# Patient Record
Sex: MALE | Race: OTHER | Employment: OTHER | URBAN - METROPOLITAN AREA
[De-identification: names, ages, dates, MRNs, and addresses within clinical notes are randomized per-mention and may not be internally consistent; named-entity substitution may affect disease eponyms.]

---

## 2018-06-11 ENCOUNTER — OFFICE VISIT (OUTPATIENT)
Dept: FAMILY MEDICINE CLINIC | Age: 78
End: 2018-06-11

## 2018-06-11 VITALS
TEMPERATURE: 97.8 F | DIASTOLIC BLOOD PRESSURE: 60 MMHG | HEIGHT: 63 IN | BODY MASS INDEX: 26.58 KG/M2 | SYSTOLIC BLOOD PRESSURE: 163 MMHG | RESPIRATION RATE: 16 BRPM | HEART RATE: 65 BPM | WEIGHT: 150 LBS | OXYGEN SATURATION: 99 %

## 2018-06-11 DIAGNOSIS — H91.92 HEARING LOSS OF LEFT EAR, UNSPECIFIED HEARING LOSS TYPE: ICD-10-CM

## 2018-06-11 DIAGNOSIS — E11.9 CONTROLLED TYPE 2 DIABETES MELLITUS WITHOUT COMPLICATION, WITHOUT LONG-TERM CURRENT USE OF INSULIN (HCC): Primary | ICD-10-CM

## 2018-06-11 RX ORDER — SIMVASTATIN 40 MG/1
TABLET, FILM COATED ORAL
COMMUNITY
End: 2018-06-11 | Stop reason: SDUPTHER

## 2018-06-11 RX ORDER — ASPIRIN 81 MG/1
TABLET ORAL DAILY
COMMUNITY
End: 2018-06-11 | Stop reason: SDUPTHER

## 2018-06-11 RX ORDER — LANOLIN ALCOHOL/MO/W.PET/CERES
1000 CREAM (GRAM) TOPICAL DAILY
COMMUNITY
End: 2018-06-11 | Stop reason: SDUPTHER

## 2018-06-11 RX ORDER — PIOGLITAZONEHYDROCHLORIDE 45 MG/1
TABLET ORAL DAILY
COMMUNITY
End: 2018-06-11 | Stop reason: SDUPTHER

## 2018-06-11 RX ORDER — LISINOPRIL 20 MG/1
20 TABLET ORAL DAILY
Qty: 90 TAB | Refills: 0 | Status: SHIPPED | OUTPATIENT
Start: 2018-06-11 | End: 2018-08-20 | Stop reason: DRUGHIGH

## 2018-06-11 RX ORDER — PIOGLITAZONEHYDROCHLORIDE 45 MG/1
45 TABLET ORAL DAILY
Qty: 90 TAB | Refills: 0 | Status: SHIPPED | OUTPATIENT
Start: 2018-06-11 | End: 2018-08-20 | Stop reason: SDUPTHER

## 2018-06-11 RX ORDER — ASPIRIN 81 MG/1
81 TABLET ORAL DAILY
Qty: 90 TAB | Refills: 0 | Status: SHIPPED | OUTPATIENT
Start: 2018-06-11 | End: 2019-01-19 | Stop reason: SDUPTHER

## 2018-06-11 RX ORDER — LISINOPRIL 20 MG/1
TABLET ORAL DAILY
COMMUNITY
End: 2018-06-11 | Stop reason: SDUPTHER

## 2018-06-11 RX ORDER — SIMVASTATIN 40 MG/1
40 TABLET, FILM COATED ORAL
Qty: 90 TAB | Refills: 0 | Status: SHIPPED | OUTPATIENT
Start: 2018-06-11 | End: 2018-08-20 | Stop reason: SDUPTHER

## 2018-06-11 RX ORDER — LANOLIN ALCOHOL/MO/W.PET/CERES
1000 CREAM (GRAM) TOPICAL DAILY
Qty: 90 TAB | Refills: 0 | Status: SHIPPED | OUTPATIENT
Start: 2018-06-11 | End: 2018-08-20 | Stop reason: SDUPTHER

## 2018-06-11 NOTE — PATIENT INSTRUCTIONS
Re-start Janumet once every morning. Monitor your blood sugars and record the results. Stop Janumet if blood sugars go below 100    Follow up here in 4 weeks, or in VB at one of our other offices.

## 2018-06-11 NOTE — MR AVS SNAPSHOT
[address] [address] 
[phone] Patient: Rochelle Hardy MRN: [id number] Visit Information Date & Time Provider Department Dept. Phone Encounter #  
 6/11/2018  4:15 PM Kyler SongCasengo [phone] [id number] Follow-up Instructions Return if symptoms worsen or fail to improve. Upcoming Health Maintenance Date Due DTaP/Tdap/Td series (1 - Tdap) 6/25/1961 ZOSTER VACCINE AGE 60> 4/25/2000 GLAUCOMA SCREENING Q2Y 6/25/2005 Pneumococcal 65+ Low/Medium Risk (1 of 2 - PCV13) 6/25/2005 Influenza Age 5 to Adult 8/1/2018 Allergies as of 6/11/2018  Review Complete On: 6/11/2018 By: Jo Flowers LPN No Known Allergies Current Immunizations  Never Reviewed No immunizations on file. Not reviewed this visit You Were Diagnosed With   
  
 Codes Comments Controlled type 2 diabetes mellitus without complication, without long-term current use of insulin ([facility]    -  Primary ICD-10-CM: E11.9 ICD-9-CM: 250.00 Vitals BP Pulse Temp Resp Height(growth percentile) Weight(growth percentile) 163/60 (BP 1 Location: Right arm, BP Patient Position: Sitting) 65 97.8 °F (36.6 °C) (Oral) 16 5' 3\" (1.6 m) 150 lb (68 kg) SpO2 BMI Smoking Status 99% 26.57 kg/m2 Never Smoker Vitals History BMI and BSA Data Body Mass Index Body Surface Area  
 26.57 kg/m 2 1.74 m 2 Preferred Pharmacy Pharmacy Name Phone [address] [place] [address], [address] [place] [address] Ave AT [address] [place] [address] & [facility] [phone] Your Updated Medication List  
  
   
This list is accurate as of 6/11/18  4:52 PM.  Always use your most recent med list.  
  
  
  
  
 aspirin delayed-release 81 mg tablet Take  by mouth daily. lisinopril 20 mg tablet Commonly known as:  Miryam Maldonado  
 Take  by mouth daily. pioglitazone 45 mg tablet Commonly known as:  ACTOS Take  by mouth daily. simvastatin 40 mg tablet Commonly known as:  ZOCOR Take  by mouth nightly. SITagliptin-metFORMIN  mg per tablet Commonly known as:  Prudencio Carrow Take 1 Tab by mouth daily. VITAMIN B-12 1,000 mcg tablet Generic drug:  cyanocobalamin Take 1,000 mcg by mouth daily. Prescriptions Sent to Pharmacy Refills SITagliptin-metFORMIN (JANUMET)  mg per tablet 5 Sig: Take 1 Tab by mouth daily. Class: Normal  
 Pharmacy: [facility] [place], [address] [place] [address] [address]  #: [phone] Route: Oral  
  
Follow-up Instructions Return if symptoms worsen or fail to improve. Patient Instructions Re-start Janumet once every morning. Monitor your blood sugars and record the results. Stop Janumet if blood sugars go below 100 Follow up here in 4 weeks, or in VB at one of our other offices. Introducing [facility]! [facility] introduces [employer] patient portal. Now you can access parts of your medical record, email your doctor's office, and request medication refills online. 1. In your internet browser, go to [email or web address] 2. Click on the First Time User? Click Here link in the Sign In box. You will see the New Member Sign Up page. 3. Enter your [employer] Access Code exactly as it appears below. You will not need to use this code after youve completed the sign-up process. If you do not sign up before the expiration date, you must request a new code. · [employer] Access Code: [email or web address] Expires: 9/9/2018  4:52 PM 
 
4. Enter the last four digits of your Social Security Number (xxxx) and Date of Birth (mm/dd/yyyy) as indicated and click Submit. You will be taken to the next sign-up page. 5. Create a [employer] ID. This will be your [employer] login ID and cannot be changed, so think of one that is secure and easy to remember. 6. Create a [employer] password. You can change your password at any time. 7. Enter your Password Reset Question and Answer. This can be used at a later time if you forget your password. 8. Enter your e-mail address. You will receive e-mail notification when new information is available in [address] 9. Click Sign Up. You can now view and download portions of your medical record. 10. Click the Download Summary menu link to download a portable copy of your medical information. If you have questions, please visit the Frequently Asked Questions section of the [employer] website. Remember, [employer] is NOT to be used for urgent needs. For medical emergencies, dial 911. Now available from your iPhone and Android! Please provide this summary of care documentation to your next provider. If you have any questions after today's visit, please call [phone].

## 2018-06-11 NOTE — PROGRESS NOTES
Rm:1    Chief Complaint   Patient presents with    Diabetes     pt states he is Type 2     Depression Screening:  PHQ over the last two weeks 6/11/2018   Little interest or pleasure in doing things Not at all   Feeling down, depressed or hopeless Not at all   Total Score PHQ 2 0       Learning Assessment:  Learning Assessment 6/11/2018   PRIMARY LEARNER Patient   HIGHEST LEVEL OF EDUCATION - PRIMARY LEARNER  GRADUATED HIGH SCHOOL OR GED   PRIMARY LANGUAGE Dominican   LEARNER PREFERENCE PRIMARY DEMONSTRATION   ANSWERED BY son   RELATIONSHIP OTHER       Abuse Screening:  No flowsheet data found. Health Maintenance reviewed and discussed per provider: yes     Coordination of Care:    1. Have you been to the ER, urgent care clinic since your last visit? Hospitalized since your last visit? no    2. Have you seen or consulted any other health care providers outside of the [address] [place] [address] since your last visit? Include any pap smears or colon screening.  no

## 2018-06-12 ENCOUNTER — PATIENT MESSAGE (OUTPATIENT)
Dept: FAMILY MEDICINE CLINIC | Age: 78
End: 2018-06-12

## 2018-06-12 DIAGNOSIS — H91.92 HEARING LOSS OF LEFT EAR, UNSPECIFIED HEARING LOSS TYPE: Primary | ICD-10-CM

## 2018-06-12 NOTE — PROGRESS NOTES
HISTORY OF PRESENT ILLNESS  Glenn Lozada is a 68 y.o. male. HPI Comments: Pt is here to establish care (with his son who helps with translating) because his previous doctor doesn't take his new insurance. However [place]  so he asks if we have any providers closer to his house. His only medical problem is T2D, apparently it is well-controlled. In 12/2017 he had an A1C of 6.7 and at that time he was taking Actos 45 mg and Janumet 5/500. He was seen a few months after that at his doctor's office and the doctor discontinued the Janumet at that time. Now, Mr. Alfonso Boswell says his blood sugars are higher (\"around 158\") but he doesn't feel as good as he did before. He needs a refill of his medications. He also mentions that he can't hear out of his left ear since trying to \"pop it\" several weeks ago by holding his nose and blowing. He would like a referral.    Diabetes   Pertinent negatives include no chest pain, no abdominal pain, no headaches and no shortness of breath. Review of Systems   Constitutional: Negative for chills and fever. Pleasant, NAD   HENT: Positive for hearing loss. Eyes: Negative for blurred vision and double vision. Respiratory: Negative for cough and shortness of breath. Cardiovascular: Negative for chest pain and palpitations. Gastrointestinal: Negative for abdominal pain, nausea and vomiting. Neurological: Negative for headaches. Physical Exam   Constitutional: He appears well-developed and well-nourished. HENT:   Right Ear: Tympanic membrane, external ear and ear canal normal.   Left Ear: Tympanic membrane, external ear and ear canal normal.   Nose: Nose normal.   Mouth/Throat: Uvula is midline, oropharynx is clear and moist and mucous membranes are normal. No posterior oropharyngeal erythema. L TM looks normal, no perf seen   Eyes: Conjunctivae are normal. Pupils are equal, round, and reactive to light.  Right conjunctiva is not injected. Left conjunctiva is not injected. Neck: Neck supple. No thyromegaly present. Cardiovascular: Normal rate, regular rhythm and normal heart sounds. Pulmonary/Chest: Effort normal and breath sounds normal. No respiratory distress. He has no wheezes. He has no rales. Abdominal: He exhibits no distension. There is no tenderness. There is no rebound. Lymphadenopathy:     He has no cervical adenopathy. Skin: No rash noted. Psychiatric: He has a normal mood and affect. His behavior is normal.   Nursing note and vitals reviewed. ASSESSMENT and PLAN    ICD-10-CM ICD-9-CM    1. Controlled type 2 diabetes mellitus without complication, without long-term current use of insulin ([facility]) E11.9 250.00 SITagliptin-metFORMIN (JANUMET)  mg per tablet      simvastatin (ZOCOR) 40 mg tablet      lisinopril (PRINIVIL, ZESTRIL) 20 mg tablet      aspirin delayed-release 81 mg tablet      cyanocobalamin (VITAMIN B-12) 1,000 mcg tablet      pioglitazone (ACTOS) 45 mg tablet      DISCONTINUED: simvastatin (ZOCOR) 40 mg tablet      DISCONTINUED: lisinopril (PRINIVIL, ZESTRIL) 20 mg tablet      DISCONTINUED: aspirin delayed-release 81 mg tablet      DISCONTINUED: cyanocobalamin (VITAMIN B-12) 1,000 mcg tablet      DISCONTINUED: pioglitazone (ACTOS) 45 mg tablet   2.  Hearing loss of left ear, unspecified hearing loss type H91.92 389.9 REFERRAL TO ENT-OTOLARYNGOLOGY

## 2018-06-13 NOTE — TELEPHONE ENCOUNTER
From: Rochelle Hardy  To: Kyler Song MD  Sent: 6/12/2018 4:51 PM EDT  Subject: Non-Urgent Medical Question    Good afternoon Doctor. We were there on the 11th of June. We forgot to ask you about the appointment information to see an specialist for my dad's hearing lost on the left ear. Please let us know. Thanks.

## 2018-07-11 ENCOUNTER — OFFICE VISIT (OUTPATIENT)
Dept: FAMILY MEDICINE CLINIC | Age: 78
End: 2018-07-11

## 2018-07-11 VITALS
TEMPERATURE: 96.4 F | BODY MASS INDEX: 26.93 KG/M2 | WEIGHT: 152 LBS | HEIGHT: 63 IN | HEART RATE: 63 BPM | RESPIRATION RATE: 14 BRPM | SYSTOLIC BLOOD PRESSURE: 167 MMHG | DIASTOLIC BLOOD PRESSURE: 48 MMHG | OXYGEN SATURATION: 96 %

## 2018-07-11 DIAGNOSIS — E11.8 TYPE 2 DIABETES MELLITUS WITH COMPLICATION, WITHOUT LONG-TERM CURRENT USE OF INSULIN (HCC): ICD-10-CM

## 2018-07-11 DIAGNOSIS — D50.8 IRON DEFICIENCY ANEMIA SECONDARY TO INADEQUATE DIETARY IRON INTAKE: ICD-10-CM

## 2018-07-11 DIAGNOSIS — R01.1 HEART MURMUR: ICD-10-CM

## 2018-07-11 DIAGNOSIS — J30.9 ALLERGIC RHINITIS, UNSPECIFIED SEASONALITY, UNSPECIFIED TRIGGER: ICD-10-CM

## 2018-07-11 DIAGNOSIS — R60.0 LOWER LEG EDEMA: Primary | ICD-10-CM

## 2018-07-11 DIAGNOSIS — E03.8 SUBCLINICAL HYPOTHYROIDISM: ICD-10-CM

## 2018-07-11 DIAGNOSIS — I10 ESSENTIAL HYPERTENSION: ICD-10-CM

## 2018-07-11 LAB — HBA1C MFR BLD HPLC: 8.6 %

## 2018-07-11 RX ORDER — MINERAL OIL
180 ENEMA (ML) RECTAL DAILY
Qty: 90 TAB | Refills: 1 | Status: SHIPPED | OUTPATIENT
Start: 2018-07-11 | End: 2018-09-27

## 2018-07-11 RX ORDER — FLUTICASONE PROPIONATE 50 MCG
2 SPRAY, SUSPENSION (ML) NASAL DAILY
Qty: 1 BOTTLE | Refills: 8 | Status: SHIPPED | OUTPATIENT
Start: 2018-07-11 | End: 2021-11-23

## 2018-07-11 NOTE — MR AVS SNAPSHOT
Perry Singleton [place] 879 68 [facility] Walter. 320 [facility] 83 [id number] 
[phone] Patient: Adán Levy MRN: [id number] AMC:[birth year]30[birth year] Visit Information Date & Time Provider Department Dept. Phone Encounter #  
 7/11/2018  3:00 PM Parris Singh, [address] [phone] [id number] Follow-up Instructions Return in about 1 month (around 8/11/2018). Upcoming Health Maintenance Date Due DTaP/Tdap/Td series (1 - Tdap) 6/25/1961 ZOSTER VACCINE AGE 60> 4/25/2000 GLAUCOMA SCREENING Q2Y 6/25/2005 Pneumococcal 65+ Low/Medium Risk (1 of 2 - PCV13) 6/25/2005 Influenza Age 5 to Adult 8/1/2018 Allergies as of 7/11/2018  Review Complete On: 7/11/2018 By: Tracey Gonzalez LPN No Known Allergies Current Immunizations  Never Reviewed No immunizations on file. Not reviewed this visit You Were Diagnosed With   
  
 Codes Comments Lower leg edema    -  Primary ICD-10-CM: R60.0 ICD-9-CM: 782.3 Heart murmur     ICD-10-CM: R01.1 ICD-9-CM: 132. 2 Type 2 diabetes mellitus with complication, without long-term current use of insulin ([facility])     ICD-10-CM: E11.8 ICD-9-CM: 250.90 Essential hypertension     ICD-10-CM: I10 
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     ICD-10-CM: D50.8 ICD-9-CM: 280.1 Subclinical hypothyroidism     ICD-10-CM: E03.9 ICD-9-CM: 244.8 Controlled type 2 diabetes mellitus without complication, without long-term current use of insulin ([facility]     ICD-10-CM: E11.9 ICD-9-CM: 250.00 Allergic rhinitis, unspecified seasonality, unspecified trigger     ICD-10-CM: J30.9 ICD-9-CM: 477.9 Vitals BP Pulse Temp Resp Height(growth percentile) Weight(growth percentile) 167/48 (BP 1 Location: Left arm, BP Patient Position: Sitting) 63 96.4 °F (35.8 °C) (Oral) 14 5' 3\" (1.6 m) 152 lb (68.9 kg) SpO2 BMI Smoking Status 96% 26.93 kg/m2 Never Smoker Vitals History BMI and BSA Data Body Mass Index Body Surface Area  
 26.93 kg/m 2 1.75 m 2 Preferred Pharmacy Pharmacy Name Phone 509 [facility], [address] AT [address] [place] [address] NECK [phone] Your Updated Medication List  
  
   
This list is accurate as of 7/11/18  4:32 PM.  Always use your most recent med list.  
  
  
  
  
 aspirin delayed-release 81 mg tablet Take 1 Tab by mouth daily. cyanocobalamin 1,000 mcg tablet Commonly known as:  VITAMIN B-12 Take 1 Tab by mouth daily. fexofenadine 180 mg tablet Commonly known as:  Rima Saran Take 1 Tab by mouth daily. fluticasone 50 mcg/actuation nasal spray Commonly known as:  Darlin Caller 2 Sprays by Both Nostrils route daily. lisinopril 20 mg tablet Commonly known as:  Aleman Addy Take 1 Tab by mouth daily. pioglitazone 45 mg tablet Commonly known as:  ACTOS Take 1 Tab by mouth daily. simvastatin 40 mg tablet Commonly known as:  ZOCOR Take 1 Tab by mouth nightly. SITagliptin-metFORMIN  mg per tablet Commonly known as:  Neville Snyder Take 1 Tab by mouth two (2) times daily (with meals). Prescriptions Sent to Pharmacy Refills  
 fexofenadine (ALLEGRA) 180 mg tablet 1 Sig: Take 1 Tab by mouth daily. Class: Normal  
 Pharmacy: [facility] Ramiro Richards [facility], [address] [place] [address] [address] Ph #: [phone] Route: Oral  
 SITagliptin-metFORMIN (JANUMET)  mg per tablet 1 Sig: Take 1 Tab by mouth two (2) times daily (with meals). Class: Normal  
 Pharmacy: [facility] [address] [facility] Ramiro Richards [place], [address] [place] [address] [address] [facility] [address] Ph #: [phone] Route: Oral  
 fluticasone (FLONASE) 50 mcg/actuation nasal spray 8 Si Sprays by Both Nostrils route daily. Class: Normal  
 Pharmacy: [facility] [address], [address]  #: [phone] Route: Both Nostrils We Performed the Following AMB POC HEMOGLOBIN A1C [96854 CPT(R)] Follow-up Instructions Return in about 1 month (around 2018). To-Do List   
 2018 Lab:  CBC WITH AUTOMATED DIFF   
  
 2018 Echocardiography:  ECHO ADULT COMPLETE   
  
 2018 Lab:  METABOLIC PANEL, COMPREHENSIVE   
  
 2018 Lab:  NT-PRO BNP   
  
 2018 Lab:  T3, FREE   
  
 2018 Lab:  T4, FREE   
  
 2018 Lab:  THYROID ANTIBODY PANEL   
  
 2018 Lab:  TSH 3RD GENERATION Patient Instructions Dieta DASH: Instrucciones de cuidado - [ DASH Diet: Care Instructions ] Instrucciones de cuidado La dieta DASH es un plan de alimentación que puede ayudar a bajar la presión arterial. DASH es la sigla en inglés de \"Dietary Approaches to Stop Hypertension\" (medidas dietéticas para disminuir la hipertensión). Hipertensión significa presión arterial darío. La dieta DASH se concentra en el consumo de alimentos con alto contenido de calcio, potasio y [place]. Estos nutrientes pueden disminuir la presión arterial. Los alimentos con el mayor contenido de [place] nutrientes son las frutas, las verduras, los productos lácteos de bajo contenido de Reina Arbon, las nueces, las semillas y las legumbres. Rajat vern suplementos de calcio, potasio y magnesio, en lugar de comer alimentos ricos en estos nutrientes, no tiene el mismo efecto. La dieta DASH también incluye granos integrales, pescado y aves. La dieta DASH es liyah de los cambios de estilo de liv que quizá le recomiende mcnair médico para reducir la presión arterial darío.  Es posible que mcnair médico también quiera que usted reduzca la cantidad de sodio en mcnair dieta. Reducir el sodio mientras sigue la dieta DASH puede bajar la presión arterial incluso más que únicamente con la dieta DASH. La atención de seguimiento es katie parte clave de mcnair tratamiento y seguridad. Asegúrese de hacer y acudir a todas las citas, y llame a mcnair médico si está teniendo problemas. También es katie buena idea saber los resultados de acacia exámenes y mantener katie lista de los medicamentos que meghan. Cómo puede cuidarse en el hogar? Cómo seguir la dieta DASH 
· Coma entre 4 y 5 porciones de fruta al día. Katie porción incluye 1 fruta de [place], ½ taza de fruta enlatada o cortada en trozos, 1/4 taza de fruta seca o 4 onzas (½ taza) de jugo de frutas. Elija fruta con más frecuencia que jugo. · Consuma entre 4 y 11 porciones de verduras al día. Katie porción incluye 1 taza de [place] o de verduras de hojas crudas, ½ taza de verduras cocidas o cortadas en trozos, o 4 onzas (½ taza) de jugo de verduras. Elija comer verduras con más frecuencia que vern mcnair jugo. · Consuma entre 2 y 3 porciones de lácteos semidescremados y descremados al día. Katie porción incluye 8 onzas de [place], 1 taza de yogur o 1½ onzas de [place]-barre. · Coma entre 6 y 6 porciones de granos todos los [address] Katie porción incluye 1 rebanada de pan, 1 onza de cereal seco, o ½ taza de arroz, pasta o cereal cocido. Trate de elegir productos de grano integral con la mayor frecuencia posible. · Limite la cantidad de [place]), aves y pescado a 2 porciones al día. Lupe Conch porción son 3 onzas, lo que es aproximadamente el tamaño de un ada de naipes. · Coma entre 4 y 5 porciones de nueces, semillas y legumbres (frijoles [habichuelas], lentejas y arvejas [chícharos] secos y cocidos) a la semana. Katie porción incluye 1/3 taza de nueces, 2 cucharadas de semillas o ½ taza de frijoles o arvejas cocidos. · [address] [facility] y aceites a 2 o 3 porciones al día. Katie porción es 1 cucharadita de aceite vegetal o 2 cucharadas de aderezo para ensaladas. · Limite los dulces y el azúcar adicional a 5 porciones o menos por semana. Brittany Lasso porción es 1 cucharada de [place] o [place], ½ taza de sorbete o 1 taza de limonada. · Consuma menos de 2,300 miligramos (mg) de sodio al día. Si limita mcnair consumo de sodio a 1,500 mg al día, puede reducir mcnair presión arterial aún más. Consejos para tener éxito · Inicie con cambios pequeños. No intente hacer cambios radicales en mcnair dieta de manera repentina. Podría sentir que extraña acacia comidas favoritas y, por lo [place], belle katie mayor probabilidad de que no cumpla el plan. Kandee Drain y [place]. Celeste Gregg que esos cambios se conviertan en un hábito, incorpore algunos [employer]. · Pruebe algo de lo siguiente: 
¨ Fíjese el objetivo de comer katie porción de fruta o de verduras en todas las comidas y los refrigerios. [place] facilitará alcanzar la cantidad diaria recomendada de frutas y verduras. ¨ Pruebe comer yogur cubierto con frutas frescas y nueces ignacio refrigerio o ignacio postre saludable. ¨ Agregue ariel, tomate, pepino y cebolla a acacia sándwiches. ¨ Combine katie masa de pizza precocida con queso mozzarella de bajo contenido de grasa (\"low-fat\") y cúbralo con abundantes verduras. Intente utilizar tomates, calabaza, espinaca, brócoli, zanahorias, coliflor y cebollas. ¨ Opte por comer katie variedad de verduras cortadas en trozos con un aderezo de bajo contenido de grasa ignacio refrigerio, en lugar de \"chips\" (tipo marco fritas) y un \"dip\" (producto untable). ¨ Espolvoree semillas de girasol o almendras picadas [employer]. O intente agregar nueces o almendras picadas a las verduras cocidas. ¨ Pruebe algunas comidas vegetarianas con frijoles y arvejas. Rushie Hews o frijoles rojos a las ensaladas. Prepare burritos y tacos con puré de frijoles pintos o negros. Dónde puede encontrar más información en inglés? Lv Perez a [email or web address].info/. Hossein [facility] G367 en la búsqueda para aprender más acerca de \"Dieta DASH: Instrucciones de cuidado - [ DASH Diet: Care Instructions ]. \" 
Revisado: 6 gem, 2017 Versión del contenido: 11.7 © 1897-8564 Healthwise, Incorporated. Las instrucciones de cuidado fueron adaptadas bajo licencia por Good Help Connections (which disclaims liability or warranty for this information). Si usted tiene [place] afección médica o sobre estas instrucciones, siempre pregunte a mcnair profesional de joe. Healthwise, Incorporated niega toda garantía o responsabilidad por mcnair uso de esta información. Dieta baja en sodio (2,000 miligramos): Instrucciones de cuidado - [ Low Sodium Diet (2,000 Milligram): Care Instructions ] Instrucciones de cuidado Demasiado sodio hace que el organismo retenga agua en exceso. [place] puede aumentar la presión arterial y obligar al corazón y a los riñones a trabajar Corlis Kin. En casos muy graves, podrían tener que hospitalizarlo. Hasta podría poner en peligro la liv. Si limita el consumo de sodio, se sentirá mejor y reducirá mcnair riesgo de tener problemas graves. La julianna más común de sodio es la sal. Las personas obtienen la mayor parte de la sal en mcnair dieta de los alimentos enlatados, preparados y de paquete. La comida rápida y los platillos de restaurante también tienen niveles muy altos de [place]. Es probable que mcnair médico le limite el sodio a menos de 2,000 miligramos (mg) al día. Marlin límite tiene en cuenta todo el sodio presente en los alimentos preparados y de [place] [employer] force, y toda la sal que añada a acacia alimentos. La atención de seguimiento es katie parte clave de mcnair tratamiento y seguridad. Asegúrese de hacer y acudir a todas las citas, y llame a mcnair médico si está teniendo problemas. También es katie buena idea saber los resultados de acacia exámenes y mantener katie lista de los medicamentos que meghan. Cómo puede cuidarse en el hogar? Tequila las etiquetas de los alimentos · [address] latas y los alimentos de paquete. [employer] qué cantidad de sodio (\"sodium\") hay en cada porción. Asegúrese de fijarse en el tamaño de la porción. Si usted come [employer] porción, consumirá emids. · Las etiquetas de los alimentos también indican el Porcentaje del Valor Diario (\"Percent Daily Value\") recomendado para el sodio. Escoja productos con un bajo Porcentaje del Valor Diario de [place]. · Tenga en cuenta que el sodio puede venir en otras formas además de la sal, entre las que se incluyen el glutamato monosódico (MSG, por acacia siglas en inglés), el citrato de sodio y el bicarbonato de [place]. La comida asiática frecuentemente contiene MSG añadido. Si sale a comer, a veces puede pedir que no le pongan MSG ni sal a acacia alimentos. Compre alimentos bajos en sodio · Compre alimentos que indiquen en la etiqueta \"sin sal\" (\"unsalted\") (sin sal añadida), \"sin sodio\" (\"sodium-free\") (menos de 5 mg de sodio por porción) o \"bajo en sodio\" (\"low-sodium\") (menos de 140 mg de sodio por porción). Los alimentos que indiquen en la etiqueta \"reducido en sodio\" (\"reduced-sodium\") y \"ligero contenido de sodio\" (\"light sodium\") aún pueden contener demasiado sodio. Asegúrese de leer la etiqueta para verificar cuánto sodio está consumiendo. · Compre verduras frescas o congeladas que no tengan salsas [id number]. Compre las versiones de bajo sodio de verduras [place], sopas y otros productos enlatados. Prepare comidas bajas en sodio · Reduzca la cantidad de sal que [place] para cocinar. [place] le ayudará a acostumbrarse al [employer]. No añada alvaro después de belle cocinado. Princess cucharadita de sal contiene alrededor de 2,300 mg de sodio. · Retire el salero de la quintanilla. · Sazone acacia comidas con ajo, jugo de hugo, cebolla, vinagre, hierbas y especias.  No use salsa de soya, salsa de soya \"lite\", salsa para stan, sal de cebolla, sal de ajo o de apio, mostaza ni ketup (salsa de [place]) en acacia comidas. · Use aderezos para ensaladas, salsas y ketchup de bajo contenido de [place]. O prepare acacia propios aderezos para ensaladas y salsas sin añadir sal. 
· Use menos sal (o nada) cuando la receta lo pida. Por lo general puede añadir la mitad de la cantidad de alvaro que pide la receta sin que esta pierda el sabor. Otros alimentos ignacio el arroz, las pastas y los cereales no necesitan sal adicional. 
· Enjuague las verduras enlatadas y cocínelas en agua fresca. [place] le nato algo de sal, marsha no toda. · Evite el agua que naturalmente tenga un alto contenido de sodio o que haya sido tratada con ablandadores, los cuales [place]. Llame a mcnair compañía de agua local para averiguar cuál es el contenido de sodio del agua. Si compra agua embotellada, susan la etiqueta y escoja katie byron sin sodio. Evite los alimentos altos en sodio · Evite comer: ¨ Candido, pescados y aves [place], curados, salados y [place]. ¨ Jamón, tocino, perros calientes (\"hot dogs\") y candido frías. ¨ Queso común, edouard y procesado y mantequilla de cacahuate (maní) común. ¨ Galletas saladas y otros refrigerios salados ignacio \"pretzels\", \"chips\" (ignacio vandana fritas) y palomitas de maíz saladas. ¨ Comidas preparadas y congeladas, a menos que tengan katie etiqueta que diga \"bajo en sodio\" (\"low-sodium\"). ¨ Sopas, caldos y consomés enlatados y secos o deshidratados, a menos que digan \"sin sodio\" (\"sodium-free\") o \"bajo en sodio\" (\"low-sodium\"). ¨ Verduras enlatadas, a menos que digan \"sin sodio\" (\"sodium-free\") o \"bajo en sodio\" (\"low-sodium\"). ¨ Vandana fritas (a la francesa), pizzas, tacos y otras comidas rápidas. ¨ Pepinillos, aceitunas, ketchup y otros condimentos, en especial la salsa de soya, a menos que digan \"sin sodio\" (\"sodium-free\") o \"bajo en sodio\" (\"low-sodium\"). Dónde puede encontrar más información en inglés? Jayy Cabezas a [email or web address].[email or web address] [place] N836 en la búsqueda para aprender más acerca de \"Dieta baja en sodio (2,000 miligramos): Instrucciones de cuidado - [ Low Sodium Diet (2,000 Milligram): Care Instructions ]. \" 
Revisado: 12 mayo, 2017 Versión del contenido: 11.7 © 1670-5353 Healthwise, Incorporated. Las instrucciones de cuidado fueron adaptadas bajo licencia por Good Help Connections (which disclaims liability or warranty for this information). Si usted tiene [place] afección médica o sobre estas instrucciones, siempre pregunte a mcnair profesional de joe. Healthwise, Incorporated niega toda garantía o responsabilidad por mcnair uso de esta información. Cómo leer las etiquetas de los alimentos para limitar el consumo de sodio: Instrucciones de cuidado - [ How to Read a Food Label to Limit Sodium: Care Instructions ] Instrucciones de cuidado El sodio hace que el cuerpo retenga agua en exceso. [place] puede aumentar la presión arterial y obligar al corazón y a los riñones a trabajar Lizeth Embs. En casos muy graves, podrían tener que hospitalizarlo. Hasta podría poner en peligro la liv. Si limita el consumo de sodio, se sentirá mejor y reducirá mcnair riesgo de tener problemas graves. Los alimentos procesados, las comidas rápidas y los platillos de restaurante son las guidry principales de sodio en la alimentación. El [address] común del sodio es \"sal\". Trate de limitar la cantidad de sodio que consume a menos de 2,300 miligramos (mg) al día. Si limita mcnair consumo de sodio a 1,500 mg al día, puede reducir mcnair presión arterial aún más. Marlin límite incluye toda la sal que consuma en los alimentos que cocine o en los de paquete. Lleve katie lista de todo lo que come y yesenia. La atención de seguimiento es katie parte clave de mcnair tratamiento y seguridad. Asegúrese de hacer y acudir a todas las citas, y llame a mcnair médico si está teniendo problemas.  También es katie buena idea [employer] resultados de acacia exámenes y mantener katie lista de los medicamentos que meghan. Cómo puede cuidarse en el hogar? Tequila la lista de ingredientes en las etiquetas de los alimentos · Tequila la lista de ingredientes en las etiquetas de los alimentos para saber cuánto sodio contienen. La etiqueta indica los ingredientes de un alimento en orden descendente (de mayor a gloria). Si la sal o el sodio es liyah de los primeros elementos de la lista, jaqueline alimento puede contener mucho sodio. · El sodio tiene nombres [place]. El sodio también se conoce ignacio glutamato monosódico (MSG, por acacia siglas en inglés, común en la comida [place]), citrato de [place], alginato de [place], hidróxido de sodio y fosfato de sodio. Tequila la información nutricional en las etiquetas · La mayoría de los alimentos tienen katie etiqueta con información nutricional. Allí encontrará la cantidad de sodio que tiene katie porción del alimento. Tequila los datos acerca del tamaño de la porción y la cantidad de [place]. El tamaño de la porción se encuentra en la parte superior de la etiqueta, por lo general, debajo del título \"Información nutricional\" (Nutrition Facts). La cantidad de sodio se encuentra en la lista debajo del título. Se expresa en miligramos (mg). ¨ Verifique detenidamente el tamaño de la porción. Katie ashley porción suele ser Gaby Negron, y es posible que coma más de [facility]. Si chilango es el gus, la cantidad de sodio que consuma será mayor que la que se menciona en la etiqueta. Por ejemplo, si el tamaño de la porción de katie sopa enlatada es 1 taza y la cantidad de sodio es 470 mg, si meghan 2 tazas, consumirá 940 mg de sodio. · La información nutricional de las frutas y las verduras frescas no aparece en esos alimentos. Es posible que se encuentre detallada en algún lado de la rebecca. Estos alimentos suelen no tener sodio o son bajos en sodio.  
· La etiqueta de información nutricional también proporciona el porcentaje del valor diario de sodio. Marlin valor es la cantidad de sodio recomendada que contiene katie porción. El valor diario para el sodio es menos de 2,300 mg. Así, si el porcentaje del valor diario dice 50%, significa que katie porción le está aportando la mitad, o sea, 1,150 mg. Compre alimentos con bajo contenido de [place] · Busque alimentos que hayan sido elaborados con gloria cantidad de sodio. Busque las siguientes palabras en la etiqueta. ¨ \"Unsalted\" (sin sal) significa que el alimento no tiene sodio agregado. Rajat es posible que el alimento ya tenga sodio en forma natural. 
¨ \"Sodium-free\" (sin sodio) significa que katie porción tiene menos de 5 mg de sodio. ¨ \"Very low sodium\" (muy bajo contenido de [place]) significa que katie porción tiene 35 mg de sodio o menos. ¨ \"Low-sodium\" (bajo contenido de sodio) significa que katie porción tiene 140 mg de sodio o menos. · \"Reduced-sodium\" (reducido contenido de sodio) significa que el alimento tiene un 25% menos de sodio que lo normal. [place], esta cantidad de sodio es aún muy darío. Trate de no comprar alimentos que digan \"reducido contenido de sodio\" en la etiqueta. · Compre verduras frescas o verduras congeladas que no tengan salsas añadidas. Compre las versiones de bajo sodio de verduras, sopas y otros productos enlatados. Dónde puede encontrar más información en inglés? Senia Noss a [email or web address] Escriba B757 en la búsqueda para aprender más acerca de \"Cómo leer las etiquetas de los alimentos para limitar el consumo de sodio: Instrucciones de cuidado - [ How to Read a Food Label to Limit Sodium: Care Instructions ]. \" 
Revisado: 12 brothers, 2017 Versión del contenido: 11.7 © 4222-0997 [employer]. Las instrucciones de cuidado fueron adaptadas bajo licencia por Good Help Connections (which disclaims liability or warranty for this information).  Si usted tiene preguntas sobre katie afección médica o sobre estas instrucciones, siempre pregunte a mcnair profesional de joe. Healthwise, Incorporated niega toda garantía o responsabilidad por mcnair uso de esta información. Dieta giovani en donna: Instrucciones de cuidado - [ Iron-Rich Diet: Care Instructions ] Instrucciones de cuidado Mcnair organismo necesita donna para producir hemoglobina. La hemoglobina es katie sustancia presente en los glóbulos rojos que lleva el oxígeno de los pulmones a las células de todo el cuerpo. Si no tiene suficiente donna, el organismo produce menos glóbulos rojos y de gloria tamaño. [address] células del organismo podrían no recibir suficiente oxígeno. Los hombres adultos necesitan 8 miligramos de donna al día y las mujeres adultas necesitan 18 miligramos al día. Después de la menopausia, las mujeres necesitan 8 miligramos de donna al día. Katie juliet embarazada necesita 27 miligramos de donna al día. Los bebés y niños pequeños tienen mayores necesidades de donna en proporción a mcnair tamaño que otros grupos de Karthikeyan. Las personas que carrillo perdido errol debido a úlceras o períodos menstruales abundantes podrían tener niveles muy bajos de donna y desarrollar anemia. [address] pueden obtener el donna que mcnair cuerpo necesita comiendo suficiente cantidad de ciertos alimentos ricos en donna. Mcnair médico podría recomendarle que tome un suplemento de donna junto con aktie dieta giovani en donna. La atención de seguimiento es katie parte clave de mcnair tratamiento y seguridad. Asegúrese de hacer y acudir a todas las citas, y llame a mcnair médico si está teniendo problemas. También es katie buena idea saber los resultados de acacia exámenes y mantener katie lista de los medicamentos que meghan. Cómo puede cuidarse en el hogar? · Giuliana que los alimentos ricos en donna alexandra parte de mcnair Lucrezia Bell. Los alimentos ricos en donna incluyen: ¨ Todas las stan, ignacio dotty, res, franco, cerdo, pescado y [place]. El hígado tiene un contenido especialmente alto de donna. ¨ Verduras de [employer]. ¨ Uvas pasas, chícharos (arvejas), frijoles (habichuelas), lentejas, cebada y huevos. ¨ Cereales para el desayuno enriquecidos con donna. · Consuma alimentos que contengan vitamina C junto con alimentos ricos en donna. La vitamina C le ayuda a absorber más donna de los alimentos. Giovanna un vaso de jugo de naranja u otro jugo cítrico con las comidas. · Coma carne y vegetales o Ronny Pun. El donna de la carne ayuda al organismo a absorber el donna presente en otros alimentos. Dónde puede encontrar más información en inglés? Reardan Sara a [email or web address] Escriba Z290 en la búsqueda para aprender más acerca de \"Dieta giovani en donna: Instrucciones de cuidado - [ Iron-Rich Diet: Care Instructions ]. \" 
Revisado: 12 mayo, 2017 Versión del contenido: 11.7 © 9815-9212 Healthwise, Incorporated. Las instrucciones de cuidado fueron adaptadas bajo licencia por Good Help Connections (which disclaims liability or warranty for this information). Si usted tiene [place] afección médica o sobre estas instrucciones, siempre pregunte a mcnair profesional de joe. Healthwise, Incorporated niega toda garantía o responsabilidad por mcnair uso de esta información. Ecocardiograma para la insuficiencia cardíaca: Instrucciones de cuidado - [ Echocardiogram for Heart Failure: Care Instructions ] Instrucciones de cuidado Un ecocardiograma, también llamado \"eco\", es un examen muy útil para comprobar si hay insuficiencia cardíaca. Insuficiencia cardíaca quiere decir que el corazón no puede bombear toda la errol que el cuerpo necesita. Renetta un ecocardiograma, mcnair médico puede comprobar la cantidad de errol que mcnair corazón bombea con cada latido. Crum se llama fracción de eyección. Un ecocardiograma también puede mostrar si mcnair corazón está agrandado y si las válvulas cardíacas funcionan ignacio [address] Renetta un ecocardiograma, usted se acuesta en katie shell. Un técnico mueve por el pecho un dispositivo portátil llamado transductor. El dispositivo envía ondas sonoras que hacen eco en el corazón. Crean katie imagen del corazón latiendo. El técnico podría pedirle que respire lentamente o que mantenga la respiración. Un ecocardiograma dura alrededor de 30 a 60 minutos. La atención de seguimiento es katie parte clave de mcnair tratamiento y seguridad. Asegúrese de hacer y acudir a todas las citas, y llame a mcnair médico si está teniendo problemas. También es katie buena idea saber los resultados de acacia exámenes y mantener katie lista de los medicamentos que meghan. Cómo puede cuidarse en el hogar? Preparación para el examen · No necesita hacer nada para prepararse. Podría ser de [facility] se vista con prendas cómodas que pueda quitarse con facilidad. Después del examen · Después de un ecocardiograma, puede hacer acacia actividades habituales. Si lo desea, puede conducir hasta mcnair hogar. Cuándo debe pedir ayuda? Llame al 911 si tiene síntomas de insuficiencia cardíaca repentina ignacio: 
  · Graves dificultades para respirar.  
  · Al toser expulsa mucosidad color rosácea y espumosa.  
  · Latidos cardíacos irregulares o rápidos.  
 Llame a mcnair médico ahora mismo o busque atención médica inmediata si: 
  · Presenta nueva o mayor falta de aire.  
  · Siente mareos o aturdimiento, o que está a punto de desmayarse.  
  · Tiene un aumento repentino de peso, ignacio más de 2 libras (0.9 kg) a 3 libras (1.4 kg) en un día o 5 libras (2.3 kg) en katie semana. (Mcnair médico podría sugerirle unos límites diferentes de aumento de [place]).  
  · Tiene las piernas, los tobillos o los pies más hinchados.  
  · De repente se siente tan cansado o débil que no puede hacer las [employer].  Preste especial atención a los cambios en mcnair joe y asegúrese de comunicarse con mcnair médico si tiene síntomas nuevos. Dónde puede encontrar más información en inglés? Floyd Nugent a [email or web address] Dinora Parmar [id number] en la búsqueda para aprender más acerca de \"Ecocardiograma para la insuficiencia cardíaca: Instrucciones de cuidado - [ Echocardiogram for Heart Failure: Care Instructions ]. \" 
Revisado: 6 diciembre, 2017 Versión del contenido: 11.7 © 9877-0406 Healthwise, Incorporated. Las instrucciones de cuidado fueron adaptadas bajo licencia por Good Help Connections (which disclaims liability or warranty for this information). Si usted tiene [place] afección médica o sobre estas instrucciones, siempre pregunte a mcnair profesional de joe. Healthwise, Incorporated niega toda garantía o responsabilidad por mcnair uso de esta información. Alergias: Instrucciones de cuidado - [ Allergies: Care Instructions ] Instrucciones de cuidado Las alergias ocurren cuando el sistema de defensa del organismo (sistema inmunitario) reacciona de manera excesiva ante ciertas sustancias. El sistema inmunitario trata a katie sustancia inofensiva ignacio si fuera un microbio perjudicial o un virus. Existen muchas sustancias que pueden provocar esta reacción excesiva, incluidos el polen, los medicamentos, los alimentos, el polvo, la caspa de los [place] y el moho. Las [employer] pueden ser leves o graves. Las alergias leves pueden manejarse en el hogar. Sin embargo, es posible que necesite vern medicamentos para prevenir problemas. Manejar las alergias es katie parte importante del estar saludable. Mcnair médico podría sugerirle que se caitlyn katie prueba de [place] para ayudar a encontrar la causa de las [employer]. Buster Zahida que sepa cuáles son las cosas que [place] síntomas, puede evitarlas. [place] puede prevenir los síntomas de [place] y [de-identified] de [place]. Para las [employer] graves que provocan reacciones que afectan a todo mcnair organismo (reacciones anafilácticas), mcnair médico podría recetarle katie inyección de epinefrina para que la lleve con usted en gus de Enoch Severance reacción grave. Aprenda a aplicarse la dosis usted mismo y llévela consigo todo el [place]. Asegúrese de que no haya caducado. La atención de seguimiento es katie parte clave de mncair tratamiento y seguridad. Asegúrese de hacer y acudir a todas las citas, y llame a mcnair médico si está teniendo problemas. También es katie buena idea saber los resultados de acacia exámenes y mantener katie lista de los medicamentos que meghan. Cómo puede cuidarse en el [facility]? · Si mcnair médico le ha [employer] ácaros del polvo o el polvo es lo que causa mcnair alergia, reduzca la cantidad de polvo alrededor de mcnair cama: 
¨ Lave las sábanas, las fundas de las almohadas y demás ropa de cama con [place] todas las semanas. ¨ Utilice fundas para almohadas, edredones y colchones a prueba de polvo. Evite las fundas de plástico, ya que tienden a romperse fácilmente y no \"respiran\". Lave la ropa de cama siguiendo las instrucciones de la etiqueta. ¨ No use mantas ni almohadas que no necesite. ¨ Use mantas que pueda maricarmen en la lavadora. [address] y las alfombras de mcnair habitación, ya que atraen y acumulan polvo. · Si usted es alérgico al polvo del hogar y a los [place], no use humidificadores. El médico puede sugerirle maneras de controlar el polvo y [place]. · Busque rastros de cucarachas. Las cucarachas provocan reacciones alérgicas. Use cebos para cucarachas para eliminarlas. Luego, limpie corry mcnair casa. A las cucarachas les [employer] lugares donde se almacenan bolsas del linda, periódicos, botellas vacías o greg de cartón. No guarde estos objetos dentro de la casa, y mantenga el contenedor de basura y los recipientes de alimentos corry cerrados.  Selle todos los lugares por donde las cucarachas puedan ingresar a mcnair hogar. · Si usted es alérgico al moho, deshágase de muebles, tapetes y alejandra que huelan a moho. Revise que no haya moho en el baño. · Si usted es alérgico al polen del exterior o a las esporas de moho, use el aire acondicionado. Cambie o limpie todos los filtros katie vez al mes. [place]. · Si usted es alérgico al polen, no salga cuando la concentración de polen sea darío. Use katie aspiradora con filtro HEPA o filtro de doble espesor al [employer] a la [place]. · No salga cuando la contaminación del aire sea darío. Evite los vapores de la pintura, los perfumes y otros olores jonathan. · Evite todo lo que pueda empeorar acacia alergias. Manténgase alejado del humo. No fume ni deje que otras personas lo barbara en mcnair casa. No use chimeneas ni estufas de leña. · Si usted es alérgico a acacia mascotas, cambie el filtro de aire de la calefacción todos los [place]. Use filtros de alto rendimiento. · Si usted es alérgico a la caspa de los [place], no deje que las mascotas entren a la casa o manténgalas fuera de mcnair habitación. Las alfombras [place] y los muebles tapizados con anel pueden albergar gran cantidad de caspa de animales. Demario vez tenga que reemplazarlos. Cuándo debe pedir ayuda? Aplíquese katie inyección de epinefrina si: 
  · Piensa que está teniendo katie reacción alérgica grave.  
  · Tiene síntomas en más de katie bryson del cuerpo, ignacio náuseas leves y comezón en la boca.  
 Después de aplicarse katie inyección de epinefrina, llame al 911 incluso si se siente mejor. 
 Llame al 911 si: 
  · Tiene síntomas de katie reacción alérgica grave. Estos pueden incluir: 
¨ Zonas abultadas y enrojecidas (ronchas) que aparecen repentinamente por todo el cuerpo. ¨ Hinchazón de la garganta, la boca, los labios o la [place]. ¨ Dificultad para respirar. ¨ Pérdida del conocimiento (desmayo). O podría sentirse muy aturdido o de repente sentirse débil, confuso o agitado.   · Le carrillo aplicado katie inyección de epinefrina, incluso si se siente mejor.  
 Llame a king médico ahora mismo o busque atención médica inmediata si: 
  · Tiene síntomas de katie reacción alérgica, tales ignacio: 
¨ Salpullido o ronchas (zonas abultadas y enrojecidas en la piel). ¨ Comezón. ¨ Hinchazón. ¨ Dolor abdominal, náuseas o vómito.  
 Preste especial atención a los cambios en king joe y asegúrese de comunicarse con king médico si: 
  · No mejora ignacio se esperaba. Dónde puede encontrar más información en inglés? Sandra Fernández a [email or web address] Nedra Carpenter L161 en la búsqueda para aprender más acerca de \"Alergias: Instrucciones de cuidado - [ Allergies: Care Instructions ]. \" 
Revisado: 6 octubre, 2017 Versión del contenido: 11.7 © 7340-2201 Healthwise, Incorporated. Las instrucciones de cuidado fueron adaptadas bajo licencia por Good Help Connections (which disclaims liability or warranty for this information). Si usted tiene [place] afección médica o sobre estas instrucciones, siempre pregunte a king profesional de joe. Healthwise, Incorporated niega toda garantía o responsabilidad por king uso de esta información. Cómo manejar las alergias: Instrucciones de cuidado - [ Managing Your Allergies: Care Instructions ] Instrucciones de cuidado Manejar las alergias es katie parte importante de mantenerse saludable. King médico le ayudará a encontrar lo que puede estar provocando las [place]. Las causas comunes de los síntomas de alergia son el polvo y los ácaros del polvo que se encuentran en el hogar, la caspa de los [place], [place] moho y el polen. [place] pronto ignacio sepa qué desencadena los síntomas, trate de reducir king exposición a esos desencadenantes. [place] puede ayudar a [employer] de [place], asma y [de-identified] problemas de [place]. Pregúntele a king médico sobre los medicamentos antialérgicos o la inmunoterapia.  Estos tratamientos pueden ayudar a reducir o a [employer] síntomas alérgicos. La atención de seguimiento es katie parte clave de mcnair tratamiento y seguridad. Asegúrese de hacer y acudir a todas las citas, y llame a mcnair médico si está teniendo problemas. También es katie buena idea saber los resultados de acacia exámenes y mantener katie lista de los medicamentos que meghan. Cómo puede cuidarse en el hogar? · Si piensa que el polvo o los ácaros del polvo son la causa de acacia alergias: 
¨ Lave las sábanas, las fundas de las almohadas y demás ropa de cama con [place] todas las semanas. ¨ Use fundas herméticas y a prueba de polvo para almohadas, edredones y colchones. Evite los cobertores de plástico, porque suelen rasgarse rápidamente y no \"respiran\". Lávelos de acuerdo con las instrucciones. ¨ Quite las [address] y las [employer] que no necesite. ¨ Use mantas que se puedan maricarmen a máquina. ¨ No use humidificadores. Pueden ayudar a que los [employer] del polvo vivan [employer]. · Use el acondicionador de aire. Cambie o limpie todos los filtros katie vez al [facility]. [facility]. Use filtros de aire de alto rendimiento. No use ventiladores de ventana ni de ático, que Cyril Landry. · Si es alérgico a la caspa de los [place], no deje que las mascotas entren a la casa o, por lo menos, no deje que entren en mcnair habitación. Las alfombras [place] y los muebles tapizados con anel pueden albergar mucha caspa de [place]. Demario vez tenga que reemplazarlos. · Busque rastros de cucarachas. Use cebos para cucarachas para eliminarlas. Luego limpie corry toda la casa. · Si es alérgico al moho, no tenga plantas de interior porque puede crecer moho en la sita. Deshágase de los [place], los tapetes y las alejandra que tengan olor a humedad. Revise que no haya moho en el baño. · Si es alérgico al polen, no salga cuando la concentración de polen sea darío. · No fume ni deje que otras personas lo barbara en mcnair hogar.  No use chimeneas ni estufas de leña. Evite los vapores de la pintura, los perfumes y otros olores jonathna. Cuándo debe pedir ayuda? Aplíquese katie inyección de epinefrina si: 
  · Piensa que está teniendo [place] reacción alérgica grave.  
 Después de aplicarse katie inyección de epinefrina, llame al 911 incluso si se siente mejor. 
 Llame al 911 si: 
  · Tiene síntomas de katie reacción alérgica grave. Estos pueden incluir: 
¨ Zonas abultadas y enrojecidas (ronchas) que aparecen repentinamente por todo el cuerpo. ¨ Hinchazón de la garganta, la boca, los labios o la [place]. ¨ Dificultad para respirar. ¨ Pérdida del conocimiento (desmayo). O podría sentirse muy mareado o de repente sentirse débil, confuso o agitado.  
  · Le carrillo aplicado katie inyección de epinefrina, incluso si se siente mejor.  
 Llame a mcnair médico ahora mismo o busque atención médica inmediata si: 
  · Tiene síntomas de katie reacción alérgica, tales ignacio: 
¨ Salpullido o ronchas (zonas abultadas y enrojecidas en la piel). ¨ Comezón. ¨ Hinchazón. ¨ Dolor abdominal, náuseas o vómito.  
 Preste especial atención a los cambios en mcnair joe y asegúrese de comunicarse con mcnair médico si: 
  · Las alergias empeoran.  
  · Tiene dificultad para controlar las alergias.  
  · Tiene preguntas sobre las pruebas de alergia.  
  · No mejora ignacio se esperaba. Dónde puede encontrar más información en inglés? Marquita Travis a [email or web address] [place] Richard V402 en la búsqueda para aprender más acerca de \"Cómo manejar las alergias: Instrucciones de cuidado - [ Managing Your Allergies: Care Instructions ]. \" 
Revisado: 6 octubre, 2017 Versión del contenido: 11.7 © 8195-6846 [employer], payleven. Las instrucciones de cuidado fueron adaptadas bajo licencia por Good Help Connections (which disclaims liability or warranty for this information).  Si usted tiene [place] afección médica o sobre estas instrucciones, siempre pregunte a mcnair profesional de joe. [facility] niega toda garantía o responsabilidad por mcnair uso de esta información. Rinitis: Instrucciones de cuidado - [ Rhinitis: Care Instructions ] Instrucciones de cuidado La rinitis es katie hinchazón e irritación en la nariz. Con frecuencia, las alergias y las infecciones son la causa. Puede tener congestión o secreción nasal. Otros síntomas son la comezón y la irritación de ojos, oídos, garganta y boca. Si las alergias son la causa, es posible que el médico le caitlyn pruebas para averiguar a qué es alérgico. Demario vez pueda detener los síntomas si ignacio las cosas que los causan. El médico puede sugerir o recetar medicamentos para [employer]. La atención de seguimiento es katie parte clave de mcnair tratamiento y seguridad. Asegúrese de hacer y acudir a todas las citas, y llame a mcnair médico si está teniendo problemas. También es katie buena idea saber los resultados de acacia exámenes y mantener katie lista de los medicamentos que meghan. Cómo puede cuidarse en el hogar? · Si mcnair rinitis es causada por alergias, trate de averiguar qué es lo que [employer] [place] pasos para evitar los desencadenantes. ¨ Evite los trabajos [facility]. Estos pueden remover el polen y el moho. ¨ No fume ni permita que otros fumen cerca de usted. Si necesita ayuda para dejar de fumar, hable con mcnair médico sobre programas y medicamentos para dejar de fumar. Estos pueden aumentar acacia probabilidades de dejar el hábito para siempre. ¨ No use aerosoles, productos de limpieza ni perfumes. [address] mcnair casa y automóvil jody la época de floración si el polen es liyah de los desencadenantes. ¨ Limpie mcnair casa con frecuencia para controlar el polvo. ¨ Mantenga las [place] fuera de mcnair casa. · Si mcnair médico le recomienda un medicamento de venta donadl para [employer] síntomas, tómelo exactamente ignacio le fue recetado.  Llame a mcnair médico si emigdio estar teniendo problemas con mcnair medicamento. · Use lavados nasales con solución salina (agua salada) para ayudar a mantener las fosas nasales despejadas y eliminar la mucosidad y las bacterias. Puede comprar gotas nasales de solución salina en un supermercado o katie farmacia. O puede prepararlas usted mismo en casa agregándole 1 cucharadita de sal y 1 cucharadita de bicarbonato de sodio a 2 tazas de agua destilada. Si las prepara usted mismo, llene katie reno de goma con la solución, introduzca la punta en la fosa nasal y apriete con suavidad. Suénese la nariz. Cuándo debe pedir ayuda? Llame a mcnair médico ahora mismo o busque atención médica inmediata si: 
  · Tiene problemas para respirar.  
 Preste especial atención a los cambios en mcnair joe y asegúrese de comunicarse con mcnair médico si: 
  · La mucosidad de la nariz se vuelve más espesa (ignacio pus) o contiene errol.  
  · Tiene síntomas nuevos o que empeoran.  
  · No mejora ignacio se esperaba. Dónde puede encontrar más información en inglés? Echo Mcdermott a [email or web address] Carlos Eduardo Winters X016 en la búsqueda para aprender más acerca de \"Rinitis: Instrucciones de cuidado - [ Rhinitis: Care Instructions ]. \" 
Revisado: 12 brothers, 2017 Versión del contenido: 11.7 © 4127-6271 Healthwise, Incorporated. Las instrucciones de cuidado fueron adaptadas bajo licencia por Good Help Connections (which disclaims liability or warranty for this information). Si usted tiene [place] afección médica o sobre estas instrucciones, siempre pregunte a mcnair profesional de joe. Healthwise, Incorporated niega toda garantía o responsabilidad por mcnair uso de esta información. Introducing [facility]! [place]: 
Thank you for requesting a [employer] account. Our records indicate that you already have an active [employer] account. You can access your account anytime at [email or web address] Did you know that you can access your hospital and ER discharge instructions at any time in [employer]? You can also review all of your test results from your hospital stay or ER visit. Additional Information If you have questions, please visit the Frequently Asked Questions section of the [employer] website at [email or web address] Remember, [employer] is NOT to be used for urgent needs. For medical emergencies, dial 911. Now available from your iPhone and Android! Please provide this summary of care documentation to your next provider. If you have any questions after today's visit, please call [phone].

## 2018-07-11 NOTE — PROGRESS NOTES
Chief Complaint   Patient presents with   [address] [facility] Establish Care    Other     Pain in Left Ear. Patient stated that he is having difficulty hearing out of left ear. 1. Have you been to the ER, urgent care clinic since your last visit? Hospitalized since your last visit? No    2. Have you seen or consulted any other health care providers outside of the [address] [place] [address] since your last visit? Include any pap smears or colon screening. Yes When: Eye Ophthalmologist Dr. Vasu Rothman on 1-9-2018     Fall Risk Assessment, last 12 mths 7/11/2018   Able to walk? Yes   Fall in past 12 months? Yes   Fall with injury?  Yes   Number of falls in past 12 months 1   Fall Risk Score 2       Visit Vitals    /48 (BP 1 Location: Left arm, BP Patient Position: Sitting)    Pulse 63    Temp 96.4 °F (35.8 °C) (Oral)    Resp 14    Ht 5' 3\" (1.6 m)    Wt 152 lb (68.9 kg)    SpO2 96%    BMI 26.93 kg/m2

## 2018-07-11 NOTE — PATIENT INSTRUCTIONS
Dieta DASH: Instrucciones de cuidado - [ DASH Diet: Care Instructions ] Instrucciones de cuidado La dieta DASH es un plan de alimentación que puede ayudar a bajar la presión arterial. DASH es la sigla en inglés de \"Dietary Approaches to Stop Hypertension\" (medidas dietéticas para disminuir la hipertensión). Hipertensión significa presión arterial darío. La dieta DASH se concentra en el consumo de alimentos con alto contenido de calcio, potasio y [place]. Estos nutrientes pueden disminuir la presión arterial. Los alimentos con el mayor contenido de [place] nutrientes son las frutas, las verduras, los productos lácteos de bajo contenido de [place], las nueces, las semillas y las legumbres. Rajat vern suplementos de calcio, potasio y magnesio, en lugar de comer alimentos ricos en estos nutrientes, no tiene el mismo efecto. La dieta DASH también incluye granos integrales, pescado y aves. La dieta DASH es liyah de los cambios de estilo de liv que quizá le recomiende mcnair médico para reducir la presión arterial darío. Es posible que mcnair médico también quiera que usted reduzca la cantidad de sodio en mcnair Aubery Cambric. Reducir el sodio mientras sigue la dieta DASH puede bajar la presión arterial incluso más que únicamente con la dieta DASH. La atención de seguimiento es katie parte clave de mcnair tratamiento y seguridad. Asegúrese de hacer y acudir a todas las citas, y llame a mcnair médico si está teniendo problemas. También es katie buena idea saber los resultados de acacia exámenes y mantener katie lista de los medicamentos que meghan. Cómo puede cuidarse en el hogar? Cómo seguir la dieta DASH 
· Coma entre 4 y 5 porciones de fruta al día. Katie porción incluye 1 fruta de [place], ½ taza de fruta enlatada o cortada en trozos, 1/4 taza de fruta seca o 4 onzas (½ taza) de jugo de frutas. Elija fruta con más frecuencia que jugo. · Consuma entre 4 y 11 porciones de verduras al día.  Katie porción incluye 1 taza de [address] verduras de hojas crudas, ½ taza de verduras cocidas o cortadas en trozos, o 4 onzas (½ taza) de jugo de verduras. Elija comer verduras con más frecuencia que vern mcnair jugo. · Consuma entre 2 y 3 porciones de lácteos semidescremados y descremados al día. Katie porción incluye 8 onzas de [place], 1 taza de yogur o 1½ onzas de [place]. · Coma entre 6 y 6 porciones de granos todos los [address] Katie porción incluye 1 rebanada de pan, 1 onza de cereal seco, o ½ taza de arroz, pasta o cereal cocido. Trate de elegir productos de grano integral con la mayor frecuencia posible. · Limite la cantidad de [place] aves y pescado a 2 porciones al día. Austin Bladen porción son 3 onzas, lo que es aproximadamente el tamaño de un ada de naipes. · Coma entre 4 y 5 porciones de nueces, semillas y legumbres (frijoles [habichuelas], lentejas y arvejas [chícharos] secos y cocidos) a la semana. Katie porción incluye 1/3 taza de nueces, 2 cucharadas de semillas o ½ taza de frijoles o arvejas cocidos. · [facility] y aceites a 2 o 3 porciones al día. Katie porción es 1 cucharadita de aceite vegetal o 2 cucharadas de aderezo para ensaladas. · Limite los dulces y el azúcar adicional a 5 porciones o menos por semana. Voncille Bladen porción es 1 cucharada de [place], ½ taza de sorbete o 1 taza de limonada. · Consuma menos de 2,300 miligramos (mg) de sodio al día. Si limita mcnair consumo de sodio a 1,500 mg al día, puede reducir mcnair presión arterial aún más. Consejos para tener éxito · Inicie con cambios pequeños. No intente hacer cambios radicales en mcnair dieta de manera repentina. Podría sentir que extraña acacia comidas favoritas y, por lo [place], belle katie mayor probabilidad de que no cumpla el plan. Manfred Jamison. Marden Bussing que esos cambios se conviertan en un hábito, incorpore algunos [employer]. · Pruebe algo de lo siguiente: 
¨ Fíjese el objetivo de comer katie porción de fruta o de verduras en todas las comidas y los refrigerios.  Burtrum facilitará alcanzar la cantidad diaria recomendada de frutas y verduras. ¨ Pruebe comer yogur cubierto con frutas frescas y nueces ignacio refrigerio o ignacio postre saludable. ¨ Agregue ariel, tomate, pepino y cebolla a acacia sándwiches. ¨ Combine katie masa de pizza precocida con queso mozzarella de bajo contenido de grasa (\"low-fat\") y cúbralo con abundantes verduras. Intente utilizar tomates, calabaza, espinaca, brócoli, zanahorias, coliflor y cebollas. ¨ Opte por comer katie variedad de verduras cortadas en trozos con un aderezo de bajo contenido de grasa ignacio refrigerio, en lugar de \"chips\" (tipo marco fritas) y un \"dip\" (producto untable). ¨ Espolvoree semillas de girasol o almendras picadas [employer]. O intente agregar nueces o almendras picadas a las verduras cocidas. ¨ Pruebe algunas comidas vegetarianas con frijoles y arvejas. Tamara [place] o frijoles rojos a las ensaladas. Prepare burritos y tacos con puré de frijoles pintos o negros. Dónde puede encontrar más información en inglés? Kimberly Vizcaino a [email or web address] Rosy Shine B202 en la búsqueda para aprender más acerca de \"Dieta DASH: Instrucciones de cuidado - [ DASH Diet: Care Instructions ]. \" 
Revisado: 6 chongiembre, 2017 Versión del contenido: 11.7 © 3755-0289 [employer], Incorporated. Las instrucciones de cuidado fueron adaptadas bajo licencia por Good Help Connections (which disclaims liability or warranty for this information). Si usted tiene [place] afección médica o sobre estas instrucciones, siempre pregunte a mcnair profesional de joe. [facility] [employer] niega toda garantía o responsabilidad por mcnair uso de esta información. Dieta baja en sodio (2,000 miligramos): Instrucciones de cuidado - [ Low Sodium Diet (2,000 Milligram): Care Instructions ] Instrucciones de cuidado Demasiado sodio hace que el organismo retenga agua en exceso.  [place] puede aumentar la presión arterial y obligar al corazón y a los riñones a trabajar Dea Milka. En casos muy graves, podrían tener que hospitalizarlo. Hasta podría poner en peligro la liv. Si limita el consumo de sodio, se sentirá mejor y reducirá mcnair riesgo de tener problemas graves. La julianna más común de sodio es la sal. Las personas obtienen la mayor parte de la sal en mcnair dieta de los alimentos enlatados, preparados y de paquete. La comida rápida y los platillos de restaurante también tienen niveles muy altos de [place]. Es probable que mcnair médico le limite el sodio a menos de 2,000 miligramos (mg) al día. Marlin límite tiene en cuenta todo el sodio presente en los alimentos preparados y de [employer], y toda la sal que añada a acacia alimentos. La atención de seguimiento es katie parte clave de mcnair tratamiento y seguridad. Asegúrese de hacer y acudir a todas las citas, y llame a mcnair médico si está teniendo problemas. También es katie buena idea saber los resultados de acacia exámenes y mantener katie lista de los medicamentos que meghan. Cómo puede cuidarse en el hogar? Tequila las etiquetas de los alimentos · [address] latas y los alimentos de paquete. La [employer] qué cantidad de sodio (\"sodium\") hay en cada porción. Asegúrese de fijarse en el tamaño de la porción. Si usted come [employer] porción, consumirá Gnammo. · Las etiquetas de los alimentos también indican el Porcentaje del Valor Diario (\"Percent Daily Value\") recomendado para el sodio. Escoja productos con un bajo Porcentaje del Valor Diario de [place]. · Tenga en cuenta que el sodio puede venir en otras formas además de la sal, entre las que se incluyen el glutamato monosódico (MSG, por acacia siglas en inglés), el citrato de sodio y el bicarbonato de [place]. La comida asiática frecuentemente contiene MSG añadido. Si sale a comer, a veces puede pedir que no le pongan MSG ni sal a acacia alimentos. Compre alimentos bajos en sodio · Compre alimentos que indiquen en la etiqueta \"sin sal\" (\"unsalted\") (sin sal añadida), \"sin sodio\" (\"sodium-free\") (menos de 5 mg de sodio por porción) o \"bajo en sodio\" (\"low-sodium\") (menos de 140 mg de sodio por porción). Los alimentos que indiquen en la etiqueta \"reducido en sodio\" (\"reduced-sodium\") y \"ligero contenido de sodio\" (\"light sodium\") aún pueden contener demasiado sodio. Asegúrese de leer la etiqueta para verificar cuánto sodio está consumiendo. · Compre verduras frescas o congeladas que no tengan salsas [id number]. Compre las versiones de bajo sodio de verduras [place], sopas y otros productos enlatados. Prepare comidas bajas en sodio · Reduzca la cantidad de sal que [place] para cocinar. [place] le ayudará a acostumbrarse al [employer]. No añada alvaro después de belle cocinado. Katie cucharadita de sal contiene alrededor de 2,300 mg de sodio. · Retire el salero de la quintanilla. · Sazone acacia comidas con ajo, jugo de hugo, cebolla, vinagre, hierbas y especias. No use salsa de soya, salsa de soya \"lite\", salsa para stan, sal de cebolla, sal de ajo o de apio, mostaza ni ketchup (salsa de tomate) en acacia comidas. · Use aderezos para ensaladas, salsas y ketchup de bajo contenido de [place]. O prepare acacia propios aderezos para ensaladas y salsas sin añadir sal. 
· Use menos sal (o nada) cuando la receta lo pida. Por lo general puede añadir la mitad de la cantidad de alvaro que pide la receta sin que esta pierda el sabor. Otros alimentos ignacio el arroz, las pastas y los cereales no necesitan sal adicional. 
· Enjuague las verduras enlatadas y cocínelas en agua fresca. Montesano le nato algo de sal, marsha no toda. · Evite el agua que naturalmente tenga un alto contenido de sodio o que haya sido tratada con ablandadores, los cuales [facility]. Llame a mcnair compañía de agua local para averiguar cuál es el contenido de sodio del agua. Si compra agua embotellada, ssuan la etiqueta y escoja katie byron sin sodio. Evite los alimentos altos en sodio · Evite comer: enrrique Jacobson y [facility], salados y enlatados. ¨ Jamón, tocino, perros calientes (\"hot dogs\") y stan frías. ¨ Queso común, edouard y procesado y mantequilla de cacahuate (maní) común. ¨ Galletas saladas y otros refrigerios salados ignacio \"pretzels\", \"chips\" (ignacio vandana fritas) y palomitas de maíz saladas. ¨ Comidas preparadas y congeladas, a menos que tengan katie etiqueta que diga \"bajo en sodio\" (\"low-sodium\"). ¨ Sopas, caldos y consomés enlatados y secos o deshidratados, a menos que digan \"sin sodio\" (\"sodium-free\") o \"bajo en sodio\" (\"low-sodium\"). ¨ Verduras enlatadas, a menos que digan \"sin sodio\" (\"sodium-free\") o \"bajo en sodio\" (\"low-sodium\"). ¨ Vandana fritas (a la francesa), pizzas, tacos y otras comidas rápidas. ¨ Pepinillos, aceitunas, ketchup y otros condimentos, en especial la salsa de soya, a menos que digan \"sin sodio\" (\"sodium-free\") o \"bajo en sodio\" (\"low-sodium\"). Dónde puede encontrar más información en inglés? Hannah Lambert a [email or web address].[email or web address] Phillip Goon U565 en la búsqueda para aprender más acerca de \"Dieta baja en sodio (2,000 miligramos): Instrucciones de cuidado - [ Low Sodium Diet (2,000 Milligram): Care Instructions ]. \" 
Revisado: 12 brothers, 2017 Versión del contenido: 11.7 © 0974-7500 [employer], Incorporated. Las instrucciones de cuidado fueron adaptadas bajo licencia por Good Help Connections (which disclaims liability or warranty for this information). Si usted tiene [place] afección médica o sobre estas instrucciones, siempre pregunte a mcnair profesional de joe. [facility], Incorporated niega toda garantía o responsabilidad por mcnair uso de esta información. Cómo leer las etiquetas de los alimentos para limitar el consumo de sodio: Instrucciones de cuidado - [ How to Read a Food Label to Limit Sodium: Care Instructions ] Instrucciones de cuidado El sodio hace que el cuerpo retenga agua en exceso.  [place] puede aumentar la presión arterial y obligar al corazón y a los riñones a trabajar [address]. En casos muy graves, podrían tener que hospitalizarlo. Hasta podría poner en peligro la liv. Si limita el consumo de sodio, se sentirá mejor y reducirá mcnair riesgo de tener problemas graves. Los alimentos procesados, las comidas rápidas y los platillos de restaurante son las guidry principales de sodio en la alimentación. El [address] común del sodio es \"sal\". Trate de limitar la cantidad de sodio que consume a menos de 2,300 miligramos (mg) al día. Si limita mcnair consumo de sodio a 1,500 mg al día, puede reducir mcnair presión arterial aún más. Marlin límite incluye toda la sal que consuma en los alimentos que cocine o en los de paquete. Lleve katie lista de todo lo que come y yesenia. La atención de seguimiento es katie parte clave de mcnair tratamiento y seguridad. Asegúrese de hacer y acudir a todas las citas, y llame a mcnair médico si está teniendo problemas. También es katie buena idea saber los resultados de acacia exámenes y mantener katie lista de los medicamentos que meghan. Cómo puede cuidarse en el hogar? Tequila la lista de ingredientes en las etiquetas de los alimentos · Tequila la lista de ingredientes en las etiquetas de los alimentos para saber cuánto sodio contienen. La etiqueta indica los ingredientes de un alimento en orden descendente (de mayor a gloria). Si la sal o el sodio es liyah de los primeros elementos de la lista, jaqueline alimento puede contener mucho sodio. · El sodio tiene nombres [place]. El sodio también se conoce ignacio glutamato monosódico (MSG, por acacia siglas en inglés, común en la comida Jaren, citrfaiza de [place], alginato de [place], hidróxido de sodio y fosfato de sodio. Tequila la información nutricional en las etiquetas · La mayoría de los alimentos tienen katie etiqueta con información nutricional. Allí encontrará la cantidad de sodio que tiene katie porción del alimento. Tequila los datos acerca del tamaño de la porción y la cantidad de [place].  El tamaño de la porción se encuentra en la parte superior de la etiqueta, por lo general, debajo del título \"Información nutricional\" (Nutrition Facts). La cantidad de sodio se encuentra en la lista debajo del título. Se expresa en miligramos (mg). ¨ Verifique detenidamente el tamaño de la porción. Princess ashley porción suele ser Valentino Alpa, y es posible que coma más de [facility]. Si chilango es el gus, la cantidad de sodio que consuma será mayor que la que se menciona en la etiqueta. Por ejemplo, si el tamaño de la porción de princess sopa enlatada es 1 taza y la cantidad de sodio es 470 mg, si meghan 2 tazas, consumirá 940 mg de sodio. · La información nutricional de las frutas y las verduras frescas no aparece en esos alimentos. Es posible que se encuentre detallada en algún lado de la rebecca. Estos alimentos suelen no tener sodio o son bajos en sodio. · La etiqueta de información nutricional también proporciona el porcentaje del valor diario de [place]. Chilango valor es la cantidad de sodio recomendada que contiene princess porción. El valor diario para el sodio es menos de 2,300 mg. Así, si el porcentaje del valor diario dice 50%, significa que princess porción le está aportando la mitad, o sea, 1,150 mg. Compre alimentos con bajo contenido de [place] · Busque alimentos que hayan sido elaborados con gloria cantidad de sodio. Busque las siguientes palabras en la etiqueta. ¨ \"Unsalted\" (sin sal) significa que el alimento no tiene sodio agregado. Rajat es posible que el alimento ya tenga sodio en forma natural. 
¨ \"Sodium-free\" (sin sodio) significa que princess porción tiene menos de 5 mg de sodio. ¨ \"Very low sodium\" (muy bajo contenido de [place]) significa que princess porción tiene 35 mg de sodio o menos. ¨ \"Low-sodium\" (bajo contenido de sodio) significa que princess porción tiene 140 mg de sodio o menos. · \"Reduced-sodium\" (reducido contenido de sodio) significa que el alimento tiene un 25% menos de sodio que lo normal. [place], esta cantidad de sodio es aún muy darío.  Trate de no comprar alimentos que digan \"reducido contenido de sodio\" en la etiqueta. · Compre verduras frescas o verduras congeladas que no tengan salsas añadidas. Compre las versiones de bajo sodio de verduras, sopas y otros productos enlatados. Dónde puede encontrar más información en inglés? Marquita Robles a [email or web address] Escriba B757 en la búsqueda para aprender más acerca de \"Cómo leer las etiquetas de los alimentos para limitar el consumo de sodio: Instrucciones de cuidado - [ How to Read a Food Label to Limit Sodium: Care Instructions ]. \" 
Revisado: 12 mayo, 2017 Versión del contenido: 11.7 © 2392-6248 Healthwise, Incorporated. Las instrucciones de cuidado fueron adaptadas bajo licencia por Good Help Connections (which disclaims liability or warranty for this information). Si usted tiene [place] afección médica o sobre estas instrucciones, siempre pregunte a king profesional de joe. Healthwise, Incorporated niega toda garantía o responsabilidad por king uso de esta información. Dieta giovani en donna: Instrucciones de cuidado - [ Iron-Rich Diet: Care Instructions ] Instrucciones de cuidado King organismo necesita donna para producir hemoglobina. La hemoglobina es katie sustancia presente en los glóbulos rojos que lleva el oxígeno de los pulmones a las células de todo el cuerpo. Si no tiene suficiente donna, el organismo produce menos glóbulos rojos y de gloria tamaño. [address] células del organismo podrían no recibir suficiente oxígeno. Los hombres adultos necesitan 8 miligramos de donna al día y las mujeres adultas necesitan 18 miligramos al día. Después de la menopausia, las mujeres necesitan 8 miligramos de donna al día. Katie juliet embarazada necesita 27 miligramos de donna al día. Los bebés y niños pequeños tienen mayores necesidades de donna en proporción a king tamaño que otros grupos de Karthikeyan.  Las personas que carrillo perdido errol debido a [place] menstruales abundantes podrían tener niveles muy bajos de donna y desarrollar anemia. [address] pueden obtener el donna que king cuerpo necesita comiendo suficiente cantidad de ciertos alimentos ricos en donna. King médico podría recomendarle que tome un suplemento de donna junto con katie dieta giovani en donna. La atención de seguimiento es katie parte clave de king tratamiento y seguridad. Asegúrese de hacer y acudir a todas las citas, y llame a king médico si está teniendo problemas. También es katie buena idea saber los resultados de acacia exámenes y mantener katie lista de los medicamentos que meghan. Cómo puede cuidarse en el hogar? · Giuliana que los alimentos ricos en donna alexandra parte de king Terrea Hum[place]bird. Los alimentos ricos en donna incluyen: ¨ Todas las stan, ignacio dotty, res, franco, cerdo, pescado y [place]. El hígado tiene un contenido especialmente alto de donna. ¨ Verduras de [employer]. ¨ Uvas pasas, chícharos (arvejas), frijoles (habichuelas), lentejas, cebada y huevos. ¨ Cereales para el desayuno enriquecidos con donna. · Consuma alimentos que contengan vitamina C junto con alimentos ricos en donna. La vitamina C le ayuda a absorber más donna de los alimentos. Giovanna un vaso de jugo de naranja u otro jugo cítrico con las comidas. · Coma carne y vegetales o [place]. El donna de la carne ayuda al organismo a absorber el donna presente en otros alimentos. Dónde puede encontrar más información en inglés? Sebastián Pierce a [email or web address]-janes.[email or web address] Escriba Z290 en la búsqueda para aprender más acerca de \"Dieta giovani en donna: Instrucciones de cuidado - [ Iron-Rich Diet: Care Instructions ]. \" 
Revisado: 12 brothers, 2017 Versión del contenido: 11.7 © 2908-1806 [employer]. Las instrucciones de cuidado fueron adaptadas bajo licencia por Good Help Connections (which disclaims liability or warranty for this information).  Si usted tiene [place] afección médica o sobre estas instrucciones, siempre pregunte a mcnair profesional de joe. [facility], Incorporated niega toda garantía o responsabilidad por mcnair uso de esta información. Ecocardiograma para la insuficiencia cardíaca: Instrucciones de cuidado - [ Echocardiogram for Heart Failure: Care Instructions ] Instrucciones de cuidado Un ecocardiograma, también llamado \"eco\", es un examen muy útil para comprobar si hay insuficiencia cardíaca. Insuficiencia cardíaca quiere decir que el corazón no puede bombear toda la errol que el cuerpo necesita. Renetta un ecocardiograma, mcnair médico puede comprobar la cantidad de errol que mcnair corazón bombea con cada latido. [place] se llama fracción de eyección. Un ecocardiograma también puede mostrar si mcnair corazón está agrandado y si las válvulas cardíacas funcionan ignacio [address] Renetta un ecocardiograma, usted se acuesta en katie shell. Un técnico mueve por el pecho un dispositivo portátil llamado transductor. El dispositivo envía ondas sonoras que hacen eco en el corazón. Crean katie imagen del corazón latiendo. El técnico podría pedirle que respire lentamente o que mantenga la respiración. Un ecocardiograma dura alrededor de 30 a 60 minutos. La atención de seguimiento es katie parte clave de mcnair tratamiento y seguridad. Asegúrese de hacer y acudir a todas las citas, y llame a mcnair médico si está teniendo problemas. También es katie buena idea saber los resultados de acacia exámenes y mantener katie lista de los medicamentos que meghan. Cómo puede cuidarse en el hogar? Preparación para el examen · No necesita hacer nada para prepararse. Podría ser de [facility] se vista con prendas cómodas que pueda quitarse con facilidad. Después del examen · Después de un ecocardiograma, puede hacer acacia actividades habituales. Si lo desea, puede conducir hasta mcnair hogar. Cuándo debe pedir ayuda?  
Llame al 911 si tiene síntomas de insuficiencia cardíaca repentina ignacio: 
  · Graves dificultades para respirar.  
  · Al toser expulsa mucosidad color rosácea y espumosa.  
  · Latidos cardíacos irregulares o rápidos.  
 Llame a mcnair médico ahora mismo o busque atención médica inmediata si: 
  · Presenta nueva o mayor falta de aire.  
  · Siente mareos o aturdimiento, o que está a punto de desmayarse.  
  · Tiene un aumento repentino de peso, ignacio más de 2 libras (0.9 kg) a 3 libras (1.4 kg) en un día o 5 libras (2.3 kg) en katie semana. (Mcnair médico podría sugerirle unos límites diferentes de aumento de [place]).  
  · Tiene las piernas, los tobillos o los pies más hinchados.  
  · De repente se siente tan cansado o débil que no puede hacer las actividades usuales.  
 Preste especial atención a los cambios en mcnair joe y asegúrese de comunicarse con mcnair médico si tiene síntomas nuevos. Dónde puede encontrar más información en inglés? Deanna Sorto a [email or web address] Mark Logan B589 en la búsqueda para aprender más acerca de \"Ecocardiograma para la insuficiencia cardíaca: Instrucciones de cuidado - [ Echocardiogram for Heart Failure: Care Instructions ]. \" 
Revisado: 6 diciembre, 2017 Versión del contenido: 11.7 © 3340-3169 Healthwise, Incorporated. Las instrucciones de cuidado fueron adaptadas bajo licencia por Good Help Connections (which disclaims liability or warranty for this information). Si usted tiene [place] afección médica o sobre estas instrucciones, siempre pregunte a mcnair profesional de joe. Healthwise, Incorporated niega toda garantía o responsabilidad por mcnair uso de esta información. Alergias: Instrucciones de cuidado - [ Allergies: Care Instructions ] Instrucciones de cuidado Las alergias ocurren cuando el sistema de defensa del organismo (sistema inmunitario) reacciona de manera excesiva ante ciertas sustancias. El sistema inmunitario trata a katie sustancia inofensiva ignacio si fuera un microbio perjudicial o un virus.  Existen muchas sustancias que pueden provocar esta reacción excesiva, incluidos el polen, los medicamentos, los alimentos, el polvo, la caspa de los animales y el moho. Las [place] pueden ser leves o graves. Las alergias leves pueden manejarse en el hogar. Sin embargo, es posible que necesite vern medicamentos para prevenir problemas. Manejar las alergias es katie parte importante del estar saludable. Mcnair médico podría sugerirle que se caitlyn katie prueba de [place] para ayudar a encontrar la causa de las [place]. Celeste Gregg que sepa cuáles son las cosas que [place] síntomas, puede evitarlas. [place] puede prevenir los síntomas de [place] y [de-identified] de [facility]. Para las Lankin graves que provocan reacciones que afectan a todo mcnair organismo (reacciones anafilácticas), mcnair médico podría recetarle katie inyección de epinefrina para que la lleve con usted en gus de [place] reacción grave. Aprenda a aplicarse la dosis usted mismo y llévela consigo todo el [place]. Asegúrese de que no haya caducado. La atención de seguimiento es katie parte clave de mcnair tratamiento y seguridad. Asegúrese de hacer y acudir a todas las citas, y llame a mcnair médico si está teniendo problemas. También es katie buena idea saber los resultados de acacia exámenes y mantener katie lista de los medicamentos que meghan. Cómo puede cuidarse en el hogar? · Si mcnair médico le ha [employer] ácaros del polvo o el polvo es lo que causa mcnair alergia, reduzca la cantidad de polvo alrededor de mcnair cama: 
¨ Lave las sábanas, las fundas de las almohadas y demás ropa de cama con [place] todas las semanas. ¨ Utilice fundas para almohadas, edredones y colchones a prueba de polvo. Evite las fundas de plástico, ya que tienden a romperse fácilmente y no \"respiran\". Lave la ropa de cama siguiendo las instrucciones de la etiqueta. ¨ No use mantas ni almohadas que no necesite. ¨ Use mantas que pueda maricarmen en la lavadora.  
¨ [address] alejandra y las alfombras de mcnair habitación, ya que atraen y acumulan polvo. · Si usted es alérgico al polvo del hogar y a los [place], no use humidificadores. El médico puede sugerirle maneras de controlar el polvo y [place]. · Busque rastros de cucarachas. Las cucarachas provocan reacciones alérgicas. Use cebos para cucarachas para eliminarlas. Luego, limpie corry mcnair casa. A las cucarachas les [employer] lugares donde se almacenan bolsas del linda, periódicos, botellas vacías o greg de cartón. No guarde estos objetos dentro de la casa, y mantenga el contenedor de basura y los recipientes de alimentos corry cerrados. Selle todos los lugares por donde las cucarachas puedan ingresar a mcnair hogar. · Si usted es alérgico al moho, deshágase de muebles, tapetes y alejandra que huelan a moho. Revise que no haya moho en el baño. · Si usted es alérgico al polen del exterior o a las esporas de moho, use el aire acondicionado. Cambie o limpie todos los filtros katie vez al [facility]. [facility]. · Si usted es alérgico al polen, no salga cuando la concentración de polen sea darío. Use katie aspiradora con filtro HEPA o filtro de doble espesor al [employer] a la [place]. · No salga cuando la contaminación del aire sea darío. Evite los vapores de la pintura, los perfumes y otros olores jonathan. · Evite todo lo que pueda empeorar acacia alergias. Manténgase alejado del humo. No fume ni deje que otras personas lo barbara en mcnair casa. No use chimeneas ni estufas de leña. · Si usted es alérgico a acacia mascotas, cambie el filtro de aire de la calefacción todos los [place]. Use filtros de alto rendimiento. · Si usted es alérgico a la caspa de los [place], no deje que las mascotas entren a la casa o manténgalas fuera de mcnair habitación. Las alfombras [place] y los muebles tapizados con anel pueden albergar gran cantidad de caspa de animales. Demario vez tenga que reemplazarlos. Cuándo debe pedir ayuda?  
Aplíquese katie inyección de epinefrina si: 
  · Piensa que está teniendo [place] reacción alérgica grave.  
  · Tiene síntomas en más de katie bryson del cuerpo, ignacio náuseas leves y comezón en la boca.  
 Después de aplicarse katie inyección de epinefrina, llame al 911 incluso si se siente mejor. 
 Llame al 911 si: 
  · Tiene síntomas de katie reacción alérgica grave. Estos pueden incluir: 
¨ Zonas abultadas y enrojecidas (ronchas) que aparecen repentinamente por todo el cuerpo. ¨ Hinchazón de la garganta, la boca, los labios o la [place]. ¨ Dificultad para respirar. ¨ Pérdida del conocimiento (desmayo). O podría sentirse muy aturdido o de repente sentirse débil, confuso o agitado.  
  · Le carrillo aplicado katie inyección de epinefrina, incluso si se siente mejor.  
 Llame a mcnair médico ahora mismo o busque atención médica inmediata si: 
  · Tiene síntomas de katie reacción alérgica, tales ignacio: 
¨ Salpullido o ronchas (zonas abultadas y enrojecidas en la piel). ¨ Comezón. ¨ Hinchazón. ¨ Dolor abdominal, náuseas o vómito.  
 Preste especial atención a los cambios en mcnair joe y asegúrese de comunicarse con mcnair médico si: 
  · No mejora ignacio se esperaba. Dónde puede encontrar más información en inglés? Yanni Gordon a [email or web address].info/. Kendra Wang O859 en la búsqueda para aprender más acerca de \"Alergias: Instrucciones de cuidado - [ Allergies: Care Instructions ]. \" 
Revisado: 6 octubre, 2017 Versión del contenido: 11.7 © 3746-8669 [employer]. Las instrucciones de cuidado fueron adaptadas bajo licencia por Good Help Connections (which disclaims liability or warranty for this information). Si usted tiene [place] afección médica o sobre estas instrucciones, siempre pregunte a mcnair profesional de joe. [employer] niega toda garantía o responsabilidad por mcnair uso de esta información. Cómo manejar las alergias: Instrucciones de cuidado - [ Managing Your Allergies: Care Instructions ] Instrucciones de cuidado Manejar las [place] es katie parte importante de mantenerse saludable. Mcnair médico le ayudará a encontrar lo que puede estar provocando las [place]. Las causas comunes de los síntomas de alergia son el polvo y los ácaros del polvo que se encuentran en el hogar, la caspa de los [place], [place] moho y el polen. [place] pronto ignacio sepa qué desencadena los síntomas, trate de reducir mcnair exposición a esos desencadenantes. [place] puede ayudar a [employer] de [place], asma y [de-identified] problemas de [place]. Pregúntele a mcnair médico sobre los medicamentos antialérgicos o la inmunoterapia. Estos tratamientos pueden ayudar a reducir o a prevenir los síntomas alérgicos. La atención de seguimiento es katie parte clave de mcnair tratamiento y seguridad. Asegúrese de hacer y acudir a todas las citas, y llame a mcnair médico si está teniendo problemas. También es katie buena idea saber los resultados de acacia exámenes y mantener katie lista de los medicamentos que meghan. Cómo puede cuidarse en el hogar? · Si piensa que el polvo o los ácaros del polvo son la causa de acacia alergias: 
¨ Lave las sábanas, las fundas de las almohadas y demás ropa de cama con [place] todas las semanas. ¨ Use fundas herméticas y a prueba de polvo para almohadas, edredones y colchones. Evite los cobertores de plástico, porque suelen rasgarse rápidamente y no \"respiran\". Lávelos de acuerdo con las instrucciones. ¨ Quite las [address] y las [employer] que no necesite. ¨ Use mantas que se puedan maricarmen a máquina. ¨ No use humidificadores. Pueden ayudar a que los [employer] del polvo vivan [place]. · Use el acondicionador de aire. Cambie o limpie todos los filtros katie vez al mes. [facility]. Use filtros de aire de alto rendimiento. No use ventiladores de ventana ni de ático, que [place] de [place]. · Si es alérgico a la caspa de los [place], no deje que las mascotas entren a la casa o, por lo menos, no deje que entren en mcnair habitación.  Estrellita Ivory y los ludin tapizados con anel pueden albergar mucha caspa de animales. Demario vez tenga que reemplazarlos. · Busque rastros de cucarachas. Use cebos para cucarachas para eliminarlas. Luego limpie corry toda la casa. · Si es alérgico al moho, no tenga plantas de interior porque puede crecer moho en la sita. Deshágase de los [place], los tapetes y las alejandra que tengan olor a humedad. Revise que no haya moho en el baño. · Si es alérgico al polen, no salga cuando la concentración de polen sea darío. · No fume ni deje que otras personas lo barbara en mcnair hogar. No use chimeneas ni estufas de leña. Evite los vapores de la pintura, los perfumes y otros olores jonathan. Cuándo debe pedir ayuda? Aplíquese katie inyección de epinefrina si: 
  · Piensa que está teniendo [facility] reacción alérgica grave.  
 Después de aplicarse katie inyección de epinefrina, llame al 911 incluso si se siente mejor. 
 Llame al 911 si: 
  · Tiene síntomas de katie reacción alérgica grave. Estos pueden incluir: 
¨ Zonas abultadas y enrojecidas (ronchas) que aparecen repentinamente por todo el cuerpo. ¨ Hinchazón de la garganta, la boca, los labios o la [place]. ¨ Dificultad para respirar. ¨ Pérdida del conocimiento (desmayo). O podría sentirse muy mareado o de repente sentirse débil, confuso o agitado.  
  · Le carrillo aplicado katie inyección de epinefrina, incluso si se siente mejor.  
 Llame a mcnair médico ahora mismo o busque atención médica inmediata si: 
  · Tiene síntomas de ktaie reacción alérgica, tales ignacio: 
¨ Salpullido o ronchas (zonas abultadas y enrojecidas en la piel). ¨ Comezón. ¨ Hinchazón. ¨ Dolor abdominal, náuseas o vómito.  
 Preste especial atención a los cambios en mcnair joe y asegúrese de comunicarse con mcnair médico si: 
  · Las alergias empeoran.  
  · Tiene dificultad para controlar las alergias.  
  · Tiene preguntas sobre las pruebas de alergia.  
  · No mejora ignacio se esperaba. Dónde puede encontrar más información en inglés?  
Marquita Travis a http://christa.info/. Milan Camila K151 en la búsqueda para aprender más acerca de \"Cómo manejar las alergias: Instrucciones de cuidado - [ Managing Your Allergies: Care Instructions ]. \" 
Revisado: 6 octubre, 2017 Versión del contenido: 11.7 © 4395-0384 Healthwise, Incorporated. Las instrucciones de cuidado fueron adaptadas bajo licencia por Good Help Connections (which disclaims liability or warranty for this information). Si usted tiene [place] afección médica o sobre estas instrucciones, siempre pregunte a mcnair profesional de joe. Healthwise, Incorporated niega toda garantía o responsabilidad por mcnair uso de esta información. Rinitis: Instrucciones de cuidado - [ Rhinitis: Care Instructions ] Instrucciones de cuidado La rinitis es katie hinchazón e irritación en la nariz. Con frecuencia, las alergias y las infecciones son la causa. Puede tener congestión o secreción nasal. Otros síntomas son la comezón y la irritación de ojos, oídos, garganta y boca. Si las alergias son la causa, es posible que el médico le caitlyn pruebas para averiguar a qué es alérgico. Demario vez pueda detener los síntomas si ignacio las cosas que los causan. El médico puede sugerir o recetar medicamentos para [employer]. La atención de seguimiento es katie parte clave de mcnair tratamiento y seguridad. Asegúrese de hacer y acudir a todas las citas, y llame a mcnair médico si está teniendo problemas. También es katie buena idea saber los resultados de acacia exámenes y mantener katie lista de los medicamentos que meghan. Cómo puede cuidarse en el hogar? · Si mcnair rinitis es causada por alergias, trate de averiguar qué es lo que [employer]. [place] pasos para evitar los desencadenantes. ¨ Evite los trabajos [facility]. Estos pueden remover el polen y el moho. ¨ No fume ni permita que otros fumen cerca de usted.  Si necesita ayuda para dejar de fumar, hable con mcnair médico sobre programas y [employer] para dejar de fumar. Estos pueden aumentar acacia probabilidades de dejar el hábito para siempre. ¨ No use aerosoles, productos de limpieza ni perfumes. [address] mcnair casa y automóvil jody la época de floración si el polen es liayh de los desencadenantes. ¨ Limpie mcnair casa con frecuencia para controlar el polvo. ¨ Mantenga las [place] fuera de mcnair casa. · Si mcnair médico le recomienda un medicamento de venta donald para [employer] síntomas, tómelo exactamente ignacio le fue recetado. Llame a mcnair médico si emigdio estar teniendo problemas con mcnair medicamento. · Use lavados nasales con solución salina (agua salada) para ayudar a mantener las fosas nasales despejadas y eliminar la mucosidad y las bacterias. Puede comprar gotas nasales de solución salina en un supermercado o katie farmacia. O puede prepararlas usted mismo en casa agregándole 1 cucharadita de sal y 1 cucharadita de bicarbonato de sodio a 2 tazas de agua destilada. Si las prepara usted mismo, llene katie reno de goma con la solución, introduzca la punta en la fosa nasal y apriete con suavidad. Suénese la nariz. Cuándo debe pedir ayuda? Llame a mcnair médico ahora mismo o busque atención médica inmediata si: 
  · Tiene problemas para respirar.  
 Preste especial atención a los cambios en mcnair joe y asegúrese de comunicarse con mcnair médico si: 
  · La mucosidad de la nariz se vuelve más espesa (ignacio pus) o contiene errol.  
  · Tiene síntomas nuevos o que empeoran.  
  · No mejora ignacio se esperaba. Dónde puede encontrar más información en inglés? Marcianne Marie a [email or web address] Nathaniel Page I246 en la búsqueda para aprender más acerca de \"Rinitis: Instrucciones de cuidado - [ Rhinitis: Care Instructions ]. \" 
Revisado: 12 brothers, 2017 Versión del contenido: 11.7 © 9881-6081 [employer].  Las instrucciones de cuidado fueron adaptadas bajo licencia por Good Help Connections (which disclaims liability or warranty for this information). Si usted tiene [place] afección médica o sobre estas instrucciones, siempre pregunte a mcnair profesional de joe. [employer] niega toda garantía o responsabilidad por mcnair uso de esta información.

## 2018-07-11 NOTE — PROGRESS NOTES
[address] [facility]: EOC for chronic disease management HPI:  
 
Left Leg Swelling: 
- Started at the begining of the year - Dependent in nature - Using compression stocking 
- Unchanged in nature since it started DMII: 
- Taking medication as prescribed - Denies any side effects or issues with his medication - Checks blood sugar at home. No log brought in for review - Reports FBS range of 150s-180s 
- Had diabetic eye exam on 1/9/18 with [facility] ophthalmology HTN: 
- Does not check BP at home - Taking BP medication as prescribed - Diet is unrestricted - Not following an exercise regimen Thyroid Disorder: 
- Patient reports history of unspecified thyroid disease. Diagnosed around 2 years ago. - Was on methimazole - This was stopped around 6 months ago - Saw endorinology on 3/1/2018 - Thyroid testing was done at that visit ROS: Positive items marked in RED 
CON: fever, chills Cardiovascular: palpitations, CP Resp: cough, SOB, orthopnea GI: nausea, vomiting, diarrhea : dysuria, hematuria Past Medical History:  
Diagnosis Date  Cataract  Diabetes ([facility] [address]  Iron deficiency anemia  Tuberculosis Past Surgical History:  
Procedure Laterality Date  HX BELOW KNEE AMPUTATION Right  HX CATARACT REMOVAL    
 HX ORTHOPAEDIC    
 HX PROSTATECTOMY  HX REFRACTIVE SURGERY  2009 No family history on file. Social History Social History  Marital status: UNKNOWN Spouse name: N/A  
 Number of children: N/A  
 Years of education: N/A Social History Main Topics  Smoking status: Never Smoker  Smokeless tobacco: Never Used  Alcohol use No  
 Drug use: No  
 Sexual activity: No  
 
Other Topics Concern  None Social History Narrative No Known Allergies Current Outpatient Prescriptions:  
  SITagliptin-metFORMIN (JANUMET)  mg per tablet, Take 1 Tab by mouth daily. , Disp: 30 Tab, Rfl: 5  simvastatin (ZOCOR) 40 mg tablet, Take 1 Tab by mouth nightly., Disp: 90 Tab, Rfl: 0 
  lisinopril (PRINIVIL, ZESTRIL) 20 mg tablet, Take 1 Tab by mouth daily. , Disp: 90 Tab, Rfl: 0 
  aspirin delayed-release 81 mg tablet, Take 1 Tab by mouth daily. , Disp: 90 Tab, Rfl: 0 
  cyanocobalamin (VITAMIN B-12) 1,000 mcg tablet, Take 1 Tab by mouth daily. , Disp: 90 Tab, Rfl: 0 
  pioglitazone (ACTOS) 45 mg tablet, Take 1 Tab by mouth daily. , Disp: 90 Tab, Rfl: 0 Physical Exam:  
  
/48 (BP 1 Location: Left arm, BP Patient Position: Sitting)  Pulse 63  Temp 96.4 °F (35.8 °C) (Oral)   Ht 5' 3\" (1.6 m)  Wt 152 lb (68.9 kg)  SpO2 96%  BMI 26.93 kg/m2 General:  WD, WN, NAD Eyes: sclera clear bilaterally, no discharge noted, eyelids normal in appearance HENT: NCAT, TMs clear bilaterally, nasal turbinates enlarged bilaterally, oropharynx clear, MMM Lungs: bibasilar rales, Normal respiratory effort and rate CV: RRR, 2/6 murmur, no rubs or gallops ABD: soft, non-tender, non-distended, normal bowel sounds Ext: Left LE with 2+ pitting edema, Right LE s/p BKA. Patient with prosthetic leg. Skin: normal temperature, color, and texture Psych: alert and oriented to person, place and time, normal affect Neuro: speech normal, moving all extremities Results for Roman Zabala (MRN [id number]): 
 Ref. Range 7/11/2018 16:09 Hemoglobin A1c (POC) Latest Units: % 8.6 THYROTROPIN RECEPTOR AB (03/01/2018 10:11 AM) THYROTROPIN RECEPTOR AB (03/01/2018 10:11 AM) Component Value Ref Range Thyrotropin Receptor Antibody <0.50 0.00 - 1.75 IU/L  
 
T3 FREE (03/01/2018 10:11 AM) T3 FREE (03/01/2018 10:11 AM) Component Value Ref Range T3 FREE 2.6 2.3 - 4.2 pg/mL  
 
T4 FREE (03/01/2018 10:11 AM) T4 FREE (03/01/2018 10:11 AM) Component Value Ref Range T4 FREE 1.4 0.9 - 1.8 ng/dL TSH (03/01/2018 10:11 AM) TSH (03/01/2018 10:11 AM) Component Value Ref Range TSH 4.88 (H) 0.27 - 4.20 mcU/mL Assessment/Plan DMII, Inadequately Controlled: - FBS not at goal of <130. HGBA1c not at goal of <7% - Will increase Janumet to BID 
- Follow up in one month HTN, Inadequately Controlled: - Not at goal BP of <130/80 
- Counselled on dietary cahanges to promote improved BP controlled 
- Handout given on DASH diet, low sodium diet, and reading food labels for sodium content - Follow up in one month Lower Extremity Swelling: 
- DDx includes vascular, cardiac, renal, and liver etiology - Will check CMP, NT-proBNP, and echocardiogram 
- Handout given on echocardiogram 
- Follow up in one month Subclinical Hypothyroidism: 
- Suspect patient has an autoimmune thyroid condition, given history alternating hyper- and hyporthyroidism - Will check thyroid antibody panel, TSH, free T4 and free T3 
- Follow up in one month Iron Deficiency Anemia: 
- Will check CBC 
- Handout given on iron rich diet - Follow up in one month Allergic Rhinitis, Inadequately Controlled: - Will start on Flonase and Allegra regimen 
- Handout given on allergy care, allergy management, and rhinitis care - Follow up in one month Som Mcadams MD 
7/11/2018, 3:22 PM

## 2018-07-12 ENCOUNTER — HOSPITAL ENCOUNTER (OUTPATIENT)
Dept: LAB | Age: 78
Discharge: HOME | End: 2018-07-12
Payer: MEDICARE

## 2018-07-12 DIAGNOSIS — E03.8 SUBCLINICAL HYPOTHYROIDISM: ICD-10-CM

## 2018-07-12 DIAGNOSIS — D50.8 IRON DEFICIENCY ANEMIA SECONDARY TO INADEQUATE DIETARY IRON INTAKE: ICD-10-CM

## 2018-07-12 DIAGNOSIS — R60.0 LOWER LEG EDEMA: ICD-10-CM

## 2018-07-12 DIAGNOSIS — I10 ESSENTIAL HYPERTENSION: ICD-10-CM

## 2018-07-12 DIAGNOSIS — E11.8 TYPE 2 DIABETES MELLITUS WITH COMPLICATION, WITHOUT LONG-TERM CURRENT USE OF INSULIN (HCC): ICD-10-CM

## 2018-07-12 LAB
ALBUMIN SERPL-MCNC: 3.7 G/DL (ref 3.4–5)
ALBUMIN/GLOB SERPL: 1.1 {RATIO} (ref 0.8–1.7)
ALP SERPL-CCNC: 88 U/L (ref 45–117)
ALT SERPL-CCNC: 30 U/L (ref 16–61)
ANION GAP SERPL CALC-SCNC: 6 MMOL/L (ref 3–18)
AST SERPL-CCNC: 18 U/L (ref 15–37)
BASOPHILS # BLD: 0 K/UL (ref 0–0.1)
BASOPHILS NFR BLD: 1 % (ref 0–2)
BILIRUB SERPL-MCNC: 0.2 MG/DL (ref 0.2–1)
BNP SERPL-MCNC: 100 PG/ML (ref 0–1800)
BUN SERPL-MCNC: 35 MG/DL (ref 7–18)
BUN/CREAT SERPL: 28 (ref 12–20)
CALCIUM SERPL-MCNC: 8.9 MG/DL (ref 8.5–10.1)
CHLORIDE SERPL-SCNC: 106 MMOL/L (ref 100–108)
CO2 SERPL-SCNC: 30 MMOL/L (ref 21–32)
CREAT SERPL-MCNC: 1.23 MG/DL (ref 0.6–1.3)
DIFFERENTIAL METHOD BLD: ABNORMAL
EOSINOPHIL # BLD: 0.2 K/UL (ref 0–0.4)
EOSINOPHIL NFR BLD: 6 % (ref 0–5)
ERYTHROCYTE [DISTWIDTH] IN BLOOD BY AUTOMATED COUNT: 14.3 % (ref 11.6–14.5)
GLOBULIN SER CALC-MCNC: 3.5 G/DL (ref 2–4)
GLUCOSE SERPL-MCNC: 123 MG/DL (ref 74–99)
HCT VFR BLD AUTO: 28.9 % (ref 36–48)
HGB BLD-MCNC: 9.1 G/DL (ref 13–16)
LYMPHOCYTES # BLD: 1.3 K/UL (ref 0.9–3.6)
LYMPHOCYTES NFR BLD: 33 % (ref 21–52)
MCH RBC QN AUTO: 31.3 PG (ref 24–34)
MCHC RBC AUTO-ENTMCNC: 31.5 G/DL (ref 31–37)
MCV RBC AUTO: 99.3 FL (ref 74–97)
MONOCYTES # BLD: 0.3 K/UL (ref 0.05–1.2)
MONOCYTES NFR BLD: 8 % (ref 3–10)
NEUTS SEG # BLD: 2.1 K/UL (ref 1.8–8)
NEUTS SEG NFR BLD: 52 % (ref 40–73)
PLATELET # BLD AUTO: 187 K/UL (ref 135–420)
PMV BLD AUTO: 11 FL (ref 9.2–11.8)
POTASSIUM SERPL-SCNC: 4.9 MMOL/L (ref 3.5–5.5)
PROT SERPL-MCNC: 7.2 G/DL (ref 6.4–8.2)
RBC # BLD AUTO: 2.91 M/UL (ref 4.7–5.5)
SODIUM SERPL-SCNC: 142 MMOL/L (ref 136–145)
T3FREE SERPL-MCNC: 2.5 PG/ML (ref 2.18–3.98)
T4 FREE SERPL-MCNC: 1.1 NG/DL (ref 0.7–1.5)
TSH SERPL DL<=0.05 MIU/L-ACNC: 3 UIU/ML (ref 0.36–3.74)
WBC # BLD AUTO: 4 K/UL (ref 4.6–13.2)

## 2018-07-12 PROCEDURE — 36415 COLL VENOUS BLD VENIPUNCTURE: CPT | Performed by: FAMILY MEDICINE

## 2018-07-12 PROCEDURE — 84481 FREE ASSAY (FT-3): CPT | Performed by: FAMILY MEDICINE

## 2018-07-12 PROCEDURE — 84443 ASSAY THYROID STIM HORMONE: CPT | Performed by: FAMILY MEDICINE

## 2018-07-12 PROCEDURE — 84439 ASSAY OF FREE THYROXINE: CPT | Performed by: FAMILY MEDICINE

## 2018-07-12 PROCEDURE — 83880 ASSAY OF NATRIURETIC PEPTIDE: CPT | Performed by: FAMILY MEDICINE

## 2018-07-12 PROCEDURE — 85025 COMPLETE CBC W/AUTO DIFF WBC: CPT | Performed by: FAMILY MEDICINE

## 2018-07-12 PROCEDURE — 86376 MICROSOMAL ANTIBODY EACH: CPT | Performed by: FAMILY MEDICINE

## 2018-07-12 PROCEDURE — 80053 COMPREHEN METABOLIC PANEL: CPT | Performed by: FAMILY MEDICINE

## 2018-07-13 LAB
THYROGLOB AB SERPL-ACNC: 27.2 IU/ML (ref 0–0.9)
THYROPEROXIDASE AB SERPL-ACNC: 23 IU/ML (ref 0–34)

## 2018-08-02 ENCOUNTER — HOSPITAL ENCOUNTER (OUTPATIENT)
Dept: NON INVASIVE DIAGNOSTICS | Age: 78
Discharge: HOME | End: 2018-08-02
Attending: FAMILY MEDICINE
Payer: MEDICARE

## 2018-08-02 VITALS
SYSTOLIC BLOOD PRESSURE: 130 MMHG | HEIGHT: 63 IN | DIASTOLIC BLOOD PRESSURE: 71 MMHG | WEIGHT: 152 LBS | BODY MASS INDEX: 26.93 KG/M2

## 2018-08-02 DIAGNOSIS — R01.1 HEART MURMUR: ICD-10-CM

## 2018-08-02 DIAGNOSIS — R60.0 LOWER LEG EDEMA: ICD-10-CM

## 2018-08-02 PROCEDURE — 93306 TTE W/DOPPLER COMPLETE: CPT

## 2018-08-04 LAB
ECHO AV PEAK GRADIENT: 0 MMHG
ECHO AV PEAK VELOCITY: 0 CM/S
ECHO EST RA PRESSURE: 10 MMHG
ECHO LA VOL 2C: 72.77 ML (ref 18–58)
ECHO LA VOL 4C: 58.87 ML (ref 18–58)
ECHO LA VOLUME INDEX A2C: 42.29 ML/M2
ECHO LA VOLUME INDEX A4C: 34.21 ML/M2
ECHO LV EDV A2C: 69.3 ML
ECHO LV EDV A4C: 122.3 ML
ECHO LV EDV BP: 93.7 ML (ref 67–155)
ECHO LV EDV INDEX A4C: 71.1 ML/M2
ECHO LV EDV INDEX BP: 54.4 ML/M2
ECHO LV EDV NDEX A2C: 40.3 ML/M2
ECHO LV EJECTION FRACTION A2C: 69 %
ECHO LV EJECTION FRACTION A4C: 66 %
ECHO LV EJECTION FRACTION BIPLANE: 68.3 % (ref 55–100)
ECHO LV ESV A2C: 21.5 ML
ECHO LV ESV A4C: 41.2 ML
ECHO LV ESV BP: 29.7 ML (ref 22–58)
ECHO LV ESV INDEX A2C: 12.5 ML/M2
ECHO LV ESV INDEX A4C: 23.9 ML/M2
ECHO LV ESV INDEX BP: 17.3 ML/M2
ECHO LV INTERNAL DIMENSION DIASTOLIC: 4.28 CM (ref 4.2–5.9)
ECHO LV INTERNAL DIMENSION SYSTOLIC: 2.33 CM
ECHO LV ISOVOLUMETRIC RELAXATION TIME (IVRT): 55.5 MS
ECHO LV IVSD: 1.13 CM (ref 0.6–1)
ECHO LV MASS 2D: 203.5 G (ref 88–224)
ECHO LV MASS INDEX 2D: 118.3 G/M2
ECHO LV POSTERIOR WALL DIASTOLIC: 1.19 CM (ref 0.6–1)
ECHO LVOT DIAM: 1.89 CM
ECHO LVOT PEAK GRADIENT: 5.1 MMHG
ECHO LVOT PEAK VELOCITY: 112.95 CM/S
ECHO MV A VELOCITY: 98.69 CM/S
ECHO MV AREA PHT: 5.5 CM2
ECHO MV E DECELERATION TIME (DT): 139.1 MS
ECHO MV E VELOCITY: 0.8 CM/S
ECHO MV E/A RATIO: 0.01
ECHO MV PRESSURE HALF TIME (PHT): 40.3 MS
ECHO PULMONARY ARTERY SYSTOLIC PRESSURE (PASP): 10 MMHG
ECHO PVEIN A DURATION: 88.7 MS
ECHO PVEIN A VELOCITY: 25.12 CM/S
ECHO RIGHT VENTRICULAR SYSTOLIC PRESSURE (RVSP): 10 MMHG
ECHO TV REGURGITANT MAX VELOCITY: 0 CM/S
ECHO TV REGURGITANT PEAK GRADIENT: 0 MMHG

## 2018-08-20 ENCOUNTER — OFFICE VISIT (OUTPATIENT)
Dept: FAMILY MEDICINE CLINIC | Age: 78
End: 2018-08-20

## 2018-08-20 VITALS
HEIGHT: 63 IN | WEIGHT: 148.8 LBS | SYSTOLIC BLOOD PRESSURE: 142 MMHG | HEART RATE: 55 BPM | OXYGEN SATURATION: 99 % | DIASTOLIC BLOOD PRESSURE: 49 MMHG | TEMPERATURE: 96.2 F | RESPIRATION RATE: 14 BRPM | BODY MASS INDEX: 26.36 KG/M2

## 2018-08-20 DIAGNOSIS — E11.8 TYPE 2 DIABETES MELLITUS WITH COMPLICATION, WITHOUT LONG-TERM CURRENT USE OF INSULIN (HCC): ICD-10-CM

## 2018-08-20 DIAGNOSIS — I10 ESSENTIAL HYPERTENSION: ICD-10-CM

## 2018-08-20 DIAGNOSIS — J30.9 ALLERGIC RHINITIS, UNSPECIFIED SEASONALITY, UNSPECIFIED TRIGGER: ICD-10-CM

## 2018-08-20 DIAGNOSIS — E11.9 CONTROLLED TYPE 2 DIABETES MELLITUS WITHOUT COMPLICATION, WITHOUT LONG-TERM CURRENT USE OF INSULIN (HCC): ICD-10-CM

## 2018-08-20 DIAGNOSIS — D64.9 ANEMIA, UNSPECIFIED TYPE: ICD-10-CM

## 2018-08-20 DIAGNOSIS — R60.0 LOWER EXTREMITY EDEMA: Primary | ICD-10-CM

## 2018-08-20 RX ORDER — LISINOPRIL 40 MG/1
40 TABLET ORAL DAILY
Qty: 30 TAB | Refills: 1 | Status: SHIPPED | OUTPATIENT
Start: 2018-08-20 | End: 2018-10-08 | Stop reason: SDUPTHER

## 2018-08-20 NOTE — PROGRESS NOTES
[facility]    CC:     HPI:     Left Leg Swelling:  - Got requested tests  - Edema is unchanged       HTN:  - Got requested blood work  - Does not check BP at home  - Taking BP medication as prescribed  - Denies any side effects or issues with his medication  - Not following an exercise regimen      Iron Deficiency Anemia:  - Got requested blood work  - Denies any known issues with bleeding/blood loss       ROS: Positive items marked in RED  CON: fever, chills  Cardiovascular: palpitations, CP  Resp: cough, SOB, orthopnea  GI: nausea, vomiting, diarrhea, melena, hematochezia  : dysuria, hematuria      Past Medical History:   Diagnosis Date    Cataract     Diabetes ([facility] [address]     Iron deficiency anemia     Tuberculosis        Past Surgical History:   Procedure Laterality Date    HX BELOW KNEE AMPUTATION Right     HX CATARACT REMOVAL      HX ORTHOPAEDIC      HX PROSTATECTOMY      HX REFRACTIVE SURGERY  2009       No family history on file. Social History     Social History    Marital status:      Spouse name: N/A    Number of children: N/A    Years of education: N/A     Social History Main Topics    Smoking status: Never Smoker    Smokeless tobacco: Never Used    Alcohol use No    Drug use: No    Sexual activity: No     Other Topics Concern    None     Social History Narrative       No Known Allergies      Current Outpatient Prescriptions:     fexofenadine (ALLEGRA) 180 mg tablet, Take 1 Tab by mouth daily. , Disp: 90 Tab, Rfl: 1    SITagliptin-metFORMIN (JANUMET)  mg per tablet, Take 1 Tab by mouth two (2) times daily (with meals). , Disp: 180 Tab, Rfl: 1    fluticasone (FLONASE) 50 mcg/actuation nasal spray, 2 Sprays by Both Nostrils route daily. , Disp: 1 Bottle, Rfl: 8    simvastatin (ZOCOR) 40 mg tablet, Take 1 Tab by mouth nightly., Disp: 90 Tab, Rfl: 0    lisinopril (PRINIVIL, ZESTRIL) 20 mg tablet, Take 1 Tab by mouth daily. , Disp: 90 Tab, Rfl: 0    aspirin delayed-release 81 mg tablet, Take 1 Tab by mouth daily. , Disp: 90 Tab, Rfl: 0    cyanocobalamin (VITAMIN B-12) 1,000 mcg tablet, Take 1 Tab by mouth daily. , Disp: 90 Tab, Rfl: 0    pioglitazone (ACTOS) 45 mg tablet, Take 1 Tab by mouth daily. , Disp: 90 Tab, Rfl: 0    Physical Exam:      /48 (BP 1 Location: Left arm, BP Patient Position: Sitting)  Pulse (!) 55  Temp 96.2 °F (35.7 °C) (Oral)   Resp 14  Ht 5' 3\" (1.6 m)  Wt 148 lb 12.8 oz (67.5 kg)  SpO2 99%  BMI 26.36 kg/m2    General:  WD, WN, NAD  Eyes: sclera clear bilaterally, no discharge noted, eyelids normal in appearance  HENT: NCAT  Lungs: CTAB, Normal respiratory effort and rate  CV: RRR, 2/6 murmur, no rubs or gallops  ABD: soft, non-tender, non-distended, normal bowel sounds  Ext: Left LE with 2+ pitting edema, Right LE s/p BKA. Patient with prosthetic leg. Skin: normal temperature, color, and texture  Psych: alert and oriented to person, place and time, normal affect  Neuro: speech normal, moving all extremities    Echocardiogram (8/2/2018):   · Calculated left ventricular ejection fraction is 60%. Left ventricular mild concentric hypertrophy. Mild (grade 1) left ventricular diastolic dysfunction. · Left atrial cavity size is moderately dilated. · Right atrial cavity size is mildly dilated. · Mild aortic valve sclerosis with no evidence of reduced excursion. · Mitral valve non-specific thickening. Mild mitral valve regurgitation. · Mild tricuspid valve regurgitation is present. Pulmonary arterial systolic pressure is 35 mmHg. Mild pulmonary hypertension is present. · Mildly elevated central venous pressure (5-10 mmHg); IVC diameter is less than 21 mm and collapses less than 50% with respiration. Results for Milan Cohen (MRN [id number]):   Ref.  Range 7/12/2018 08:48   WBC Latest Ref Range: 4.6 - 13.2 K/uL 4.0 (L)   RBC Latest Ref Range: 4.70 - 5.50 M/uL 2.91 (L)   HGB Latest Ref Range: 13.0 - 16.0 g/dL 9.1 (L)   HCT Latest Ref Range: 36.0 - 48.0 % 28.9 (L)   MCV Latest Ref Range: 74.0 - 97.0 FL 99.3 (H)   MCH Latest Ref Range: 24.0 - 34.0 PG 31.3   MCHC Latest Ref Range: 31.0 - 37.0 g/dL 31.5   RDW Latest Ref Range: 11.6 - 14.5 % 14.3   PLATELET Latest Ref Range: 135 - 420 K/uL 187   MPV Latest Ref Range: 9.2 - 11.8 FL 11.0   NEUTROPHILS Latest Ref Range: 40 - 73 % 52   LYMPHOCYTES Latest Ref Range: 21 - 52 % 33   MONOCYTES Latest Ref Range: 3 - 10 % 8   EOSINOPHILS Latest Ref Range: 0 - 5 % 6 (H)   BASOPHILS Latest Ref Range: 0 - 2 % 1   DF Latest Units:   AUTOMATED   ABS. NEUTROPHILS Latest Ref Range: 1.8 - 8.0 K/UL 2.1   ABS. LYMPHOCYTES Latest Ref Range: 0.9 - 3.6 K/UL 1.3   ABS. MONOCYTES Latest Ref Range: 0.05 - 1.2 K/UL 0.3   ABS. EOSINOPHILS Latest Ref Range: 0.0 - 0.4 K/UL 0.2   ABS. BASOPHILS Latest Ref Range: 0.0 - 0.1 K/UL 0.0   Sodium Latest Ref Range: 136 - 145 mmol/L 142   Potassium Latest Ref Range: 3.5 - 5.5 mmol/L 4.9   Chloride Latest Ref Range: 100 - 108 mmol/L 106   CO2 Latest Ref Range: 21 - 32 mmol/L 30   Anion gap Latest Ref Range: 3.0 - 18 mmol/L 6   Glucose Latest Ref Range: 74 - 99 mg/dL 123 (H)   BUN Latest Ref Range: 7.0 - 18 MG/DL 35 (H)   Creatinine Latest Ref Range: 0.6 - 1.3 MG/DL 1.23   BUN/Creatinine ratio Latest Ref Range: 12 - 20   28 (H)   Calcium Latest Ref Range: 8.5 - 10.1 MG/DL 8.9   GFR est non-AA Latest Ref Range: >60 ml/min/1.73m2 57 (L)   GFR est AA Latest Ref Range: >60 ml/min/1.73m2 >60   Bilirubin, total Latest Ref Range: 0.2 - 1.0 MG/DL 0.2   Protein, total Latest Ref Range: 6.4 - 8.2 g/dL 7.2   Albumin Latest Ref Range: 3.4 - 5.0 g/dL 3.7   Globulin Latest Ref Range: 2.0 - 4.0 g/dL 3.5   A-G Ratio Latest Ref Range: 0.8 - 1.7   1.1   ALT (SGPT) Latest Ref Range: 16 - 61 U/L 30   AST Latest Ref Range: 15 - 37 U/L 18   Alk.  phosphatase Latest Ref Range: 45 - 117 U/L 88   NT pro-BNP Latest Ref Range: 0 - 1800 PG/   T4, Free Latest Ref Range: 0.7 - 1.5 NG/DL 1.1   Triiodothyronine (T3), free Latest Ref Range: 2.18 - 3.98 PG/ML 2.5   TSH Latest Ref Range: 0.36 - 3.74 uIU/mL 3.00       Assessment/Plan       HTN, Inadequately Controlled:  - Not at goal BP of <130/80  - Will increase lisinopril dose to 40 mg daily  - Will check urine microalbumin/cr  - Follow up in one month         Lower Extremity Swelling, Unchanged:  - Likely 2/2 vascular insuficiency  - Will refer to vascular center for further evaluation  - Follow up in one month        Iron Deficiency Anemia, Worsening:  - Will check iron panel, FIT, and ferritin level  - Follow up in one month        Carolina Morris MD  8/20/2018, 4:38 PM

## 2018-08-20 NOTE — MR AVS SNAPSHOT
Perry Singleton [place] [address] [facility] [address] [facility] [address] [address] 
[phone] Patient: Jody Leavitt MRN: [id number] [facility]:7/92/7724 Visit Information Juarez Echeverria Personal Médico Departamento Teléfono del Dep. Número de visita 8/20/2018  3:30 PM Lawyer Leiva, [address] [place] [address] [phone] Follow-up Instructions Return in about 1 month (around 9/20/2018) for Need appointment for blood work. Upcoming Health Maintenance Date Due  
 LIPID PANEL Q1 1940 FOOT EXAM Q1 6/25/1950 MICROALBUMIN Q1 6/25/1950 EYE EXAM RETINAL OR DILATED Q1 6/25/1950 DTaP/Tdap/Td series (1 - Tdap) 6/25/1961 ZOSTER VACCINE AGE 60> 4/25/2000 GLAUCOMA SCREENING Q2Y 6/25/2005 Pneumococcal 65+ Low/Medium Risk (1 of 2 - PCV13) 6/25/2005 Influenza Age 5 to Adult 8/1/2018 MEDICARE YEARLY EXAM 8/13/2018 HEMOGLOBIN A1C Q6M 1/11/2019 Alergias  Review Complete El: 8/20/2018 Por: Nieves Hess LPN A partir del:  8/20/2018 No Known Allergies Vacunas actuales [place] [employer] No hay ninguna vacuna archivada. No revisadas esta visita You Were Diagnosed With   
  
 Emily Nip Lower extremity edema    -  Primary ICD-10-CM: R60.0 ICD-9-CM: 467. 3 Anemia, unspecified type     ICD-10-CM: D64.9 ICD-9-CM: 285.9 Essential hypertension     ICD-10-CM: I10 
ICD-9-CM: 401.9 Partes vitales PS Pulso Temperatura Resp [facility] ( percentil de crecimiento) Peso (percentil de crecimiento) 142/49 (BP 1 Location: Left arm) (!) 55 96.2 °F (35.7 °C) (Oral) 14 5' 3\" (1.6 m) 148 lb 12.8 oz (67.5 kg) SpO2 BMI (IMC) Estatus de tabaquísmo 99% 26.36 kg/m2 Never Smoker Historial de signos vitales BMI and BSA Data Body Mass Index Body Surface Area  
 26.36 kg/m 2 1.73 m 2 [facility] Phone [address] [place] [address] Ave  [facility] [phone] Mcnair lista de medicamentos actualizada Connor Nieves actualizada 8/20/18  4:49 PM.  Slava Carrillo use mcnair lista de medicamentos más reciente. aspirin delayed-release 81 mg tablet Take 1 Tab by mouth daily. cyanocobalamin 1,000 mcg tablet También conocido ignacio:  VITAMIN B-12 Take 1 Tab by mouth daily. fexofenadine 180 mg tablet También conocido ignacio: Jean Aubrey Take 1 Tab by mouth daily. fluticasone 50 mcg/actuation nasal spray También conocido ignacio:  FLONASE 2 Sprays by Both Nostrils route daily. lisinopril 40 mg tablet También conocido ignacio:  Leigh Pinch Take 1 Tab by mouth daily. pioglitazone 45 mg tablet También conocido ignacio:  ACTOS Take 1 Tab by mouth daily. simvastatin 40 mg tablet También conocido ignacio:  Imagene Cordial Take 1 Tab by mouth nightly. SITagliptin-metFORMIN  mg per tablet También conocido ignacio:  Burnard Speaker Take 1 Tab by mouth two (2) times daily (with meals). Recetas Enviado a la [place] Refills  
 lisinopril (PRINIVIL, ZESTRIL) 40 mg tablet 1 Sig: Take 1 Tab by mouth daily. Class: Normal  
 Pharmacy: "[employer] [facility] [address] [facility] Susie [facility], [address] [place] [address] Ave. Ph #: [phone] Route: Oral  
  
Hicimos lo siguiente REFERRAL TO VASCULAR CENTER [WVT802 Custom] Instrucciones de seguimiento Return in about 1 month (around 9/20/2018) for Need appointment for blood work. Por hacer 08/20/2018 Lab:  FERRITIN   
  
 08/20/2018 Lab:  IRON PROFILE   
  
 08/20/2018 Lab:  MICROALBUMIN, UR, RAND W/ MICROALB/CREAT RATIO   
  
 08/20/2018 Lab:  OCCULT BLOOD IMMUNOASSAY,DIAGNOSTIC Informacion de [employer]  Codigo de Referencia Referido por Referido a  
  
 [address] [address] [address] [place], [address] Phone: [phone] Fax: [phone] Visitas Estado Neeraj Hicks de inicio Neeraj Bristle final  
 1 New Request 8/20/18 8/20/19 Si mcnair referencia tiene un estado de \"pending review\" o \"denied\" , informacion adicional sera enviada para apoyar el resultado de esta decision. Introducing [facility] SERVICES! Estimado Jun : 
Romain por solicitar katie cuenta de MyChart usted. Nuestros registros indican que usted ya tiene katie cuenta MyChart [place]. Usted puede acceder a mcnair cuenta en cualquier momento en [email or web address] Sabía usted que usted puede acceder a mcnair hospital y las instrucciones de darío ER en cualquier momento en MyChart ? También puede revisar [facility] de las pruebas de mcnair hospitalización o visita a urgencias . Información Adicional 
 
Si tiene alguna pregunta , por favor visite la sección de preguntas frecuentes del sitio web MyChart en [email or web address] . Recuerde, MyChart NO es que se utilizará para las necesidades urgentes. Para emergencias médicas , llame al 911 . Ahora disponible en mcnair iPhone y Android ! Por favor proporcione chilango resumen de la documentación de cuidado a mcnair próximo proveedor. Your primary care clinician is listed as Mode Dowling. If you have any questions after today's visit, please call [phone].

## 2018-08-20 NOTE — PROGRESS NOTES
Chief Complaint   Patient presents with    Follow Up Chronic Condition     1. Have you been to the ER, urgent care clinic since your last visit? Hospitalized since your last visit? No    2. Have you seen or consulted any other health care providers outside of the [address] [place] [address] since your last visit? Include any pap smears or colon screening. No     Fall Risk Assessment, last 12 mths 8/20/2018   Able to walk? Yes   Fall in past 12 months?  No   Fall with injury? -   Number of falls in past 12 months -   Fall Risk Score -     Visit Vitals    /48 (BP 1 Location: Left arm, BP Patient Position: Sitting)    Pulse (!) 55    Temp 96.2 °F (35.7 °C) (Oral)    Resp 14    Ht 5' 3\" (1.6 m)    Wt 148 lb 12.8 oz (67.5 kg)    SpO2 99%    BMI 26.36 kg/m2

## 2018-08-21 RX ORDER — LANOLIN ALCOHOL/MO/W.PET/CERES
1000 CREAM (GRAM) TOPICAL DAILY
Qty: 90 TAB | Refills: 0 | Status: SHIPPED | OUTPATIENT
Start: 2018-08-21

## 2018-08-21 RX ORDER — SIMVASTATIN 40 MG/1
40 TABLET, FILM COATED ORAL
Qty: 90 TAB | Refills: 0 | Status: SHIPPED | OUTPATIENT
Start: 2018-08-21 | End: 2018-11-12 | Stop reason: SDUPTHER

## 2018-08-21 RX ORDER — PIOGLITAZONEHYDROCHLORIDE 45 MG/1
45 TABLET ORAL DAILY
Qty: 90 TAB | Refills: 0 | Status: SHIPPED | OUTPATIENT
Start: 2018-08-21 | End: 2018-09-27 | Stop reason: SDUPTHER

## 2018-08-21 RX ORDER — LISINOPRIL 20 MG/1
20 TABLET ORAL DAILY
Qty: 90 TAB | Refills: 0 | OUTPATIENT
Start: 2018-08-21

## 2018-09-04 ENCOUNTER — HOSPITAL ENCOUNTER (OUTPATIENT)
Dept: LAB | Age: 78
Discharge: HOME | End: 2018-09-04
Payer: MEDICARE

## 2018-09-04 DIAGNOSIS — I10 ESSENTIAL HYPERTENSION: ICD-10-CM

## 2018-09-04 DIAGNOSIS — D64.9 ANEMIA, UNSPECIFIED TYPE: ICD-10-CM

## 2018-09-04 LAB
CREAT UR-MCNC: 45.37 MG/DL (ref 30–125)
FERRITIN SERPL-MCNC: 11 NG/ML (ref 8–388)
IRON SATN MFR SERPL: 20 %
IRON SERPL-MCNC: 76 UG/DL (ref 50–175)
MICROALBUMIN UR-MCNC: 6.8 MG/DL (ref 0–3)
MICROALBUMIN/CREAT UR-RTO: 150 MG/G (ref 0–30)
TIBC SERPL-MCNC: 388 UG/DL (ref 250–450)

## 2018-09-04 PROCEDURE — 82728 ASSAY OF FERRITIN: CPT | Performed by: FAMILY MEDICINE

## 2018-09-04 PROCEDURE — 83540 ASSAY OF IRON: CPT | Performed by: FAMILY MEDICINE

## 2018-09-04 PROCEDURE — 82043 UR ALBUMIN QUANTITATIVE: CPT | Performed by: FAMILY MEDICINE

## 2018-09-04 PROCEDURE — 36415 COLL VENOUS BLD VENIPUNCTURE: CPT | Performed by: FAMILY MEDICINE

## 2018-09-07 DIAGNOSIS — E11.9 CONTROLLED TYPE 2 DIABETES MELLITUS WITHOUT COMPLICATION, WITHOUT LONG-TERM CURRENT USE OF INSULIN (HCC): ICD-10-CM

## 2018-09-07 RX ORDER — LISINOPRIL 20 MG/1
TABLET ORAL
Qty: 90 TAB | Refills: 0 | Status: SHIPPED | OUTPATIENT
Start: 2018-09-07 | End: 2018-09-27

## 2018-09-13 LAB — HEMOCCULT STL QL IA: NEGATIVE

## 2018-09-18 ENCOUNTER — OFFICE VISIT (OUTPATIENT)
Dept: FAMILY MEDICINE CLINIC | Age: 78
End: 2018-09-18

## 2018-09-18 VITALS
SYSTOLIC BLOOD PRESSURE: 157 MMHG | TEMPERATURE: 96.2 F | OXYGEN SATURATION: 99 % | RESPIRATION RATE: 14 BRPM | HEIGHT: 63 IN | HEART RATE: 50 BPM | WEIGHT: 148 LBS | DIASTOLIC BLOOD PRESSURE: 45 MMHG | BODY MASS INDEX: 26.22 KG/M2

## 2018-09-18 DIAGNOSIS — Z23 ENCOUNTER FOR IMMUNIZATION: Primary | ICD-10-CM

## 2018-09-18 NOTE — PROGRESS NOTES
Chief Complaint Patient presents with  Follow Up Chronic Condition 1 month f/u for HTN 1. Have you been to the ER, urgent care clinic since your last visit? Hospitalized since your last visit? No 
 
2. Have you seen or consulted any other health care providers outside of the [facility] since your last visit? Include any pap smears or colon screening. No  
 
Fall Risk Assessment, last 12 mths 9/18/2018 Able to walk? Yes Fall in past 12 months? No  
Fall with injury? -  
Number of falls in past 12 months - Fall Risk Score -  
 
 
Visit Vitals  /45 (BP 1 Location: Left arm, BP Patient Position: Sitting)  Pulse (!) 50  Temp 96.2 °F (35.7 °C) (Oral)  Resp 14  
 Ht 5' 3\" (1.6 m)  Wt 148 lb (67.1 kg)  SpO2 99%  BMI 26.22 kg/m2

## 2018-09-27 ENCOUNTER — OFFICE VISIT (OUTPATIENT)
Dept: FAMILY MEDICINE CLINIC | Age: 78
End: 2018-09-27

## 2018-09-27 VITALS
OXYGEN SATURATION: 99 % | BODY MASS INDEX: 25.91 KG/M2 | WEIGHT: 146.2 LBS | DIASTOLIC BLOOD PRESSURE: 45 MMHG | RESPIRATION RATE: 14 BRPM | SYSTOLIC BLOOD PRESSURE: 147 MMHG | HEIGHT: 63 IN | HEART RATE: 53 BPM | TEMPERATURE: 95.1 F

## 2018-09-27 DIAGNOSIS — E11.9 DIABETES MELLITUS TYPE II, NON INSULIN DEPENDENT (HCC): ICD-10-CM

## 2018-09-27 DIAGNOSIS — R80.9 MICROALBUMINURIA: ICD-10-CM

## 2018-09-27 DIAGNOSIS — I10 ESSENTIAL HYPERTENSION: Primary | ICD-10-CM

## 2018-09-27 DIAGNOSIS — Z23 ENCOUNTER FOR IMMUNIZATION: ICD-10-CM

## 2018-09-27 PROBLEM — E11.8 TYPE 2 DIABETES MELLITUS WITH COMPLICATION, WITHOUT LONG-TERM CURRENT USE OF INSULIN (HCC): Status: RESOLVED | Noted: 2018-09-27 | Resolved: 2018-09-27

## 2018-09-27 PROBLEM — E11.8 TYPE 2 DIABETES MELLITUS WITH COMPLICATION, WITHOUT LONG-TERM CURRENT USE OF INSULIN (HCC): Status: ACTIVE | Noted: 2018-09-27

## 2018-09-27 RX ORDER — PIOGLITAZONEHYDROCHLORIDE 45 MG/1
45 TABLET ORAL DAILY
Qty: 90 TAB | Refills: 0 | Status: SHIPPED | OUTPATIENT
Start: 2018-09-27 | End: 2018-11-12 | Stop reason: SDUPTHER

## 2018-09-27 RX ORDER — HYDROCHLOROTHIAZIDE 25 MG/1
25 TABLET ORAL DAILY
Qty: 30 TAB | Refills: 1 | Status: SHIPPED | OUTPATIENT
Start: 2018-09-27 | End: 2018-11-16 | Stop reason: SDUPTHER

## 2018-09-27 NOTE — PATIENT INSTRUCTIONS
Prueba casera de presión arterial: Acerca de esta prueba - [ Home Blood Pressure Test: About This Test ] Qué es katie prueba casera de presión arterial? 
 
La prueba casera de presión arterial le permite que se caitlyn un seguimiento en casa a la presión arterial. La presión arterial es katie medida de la fuerza que ejerce la errol contra las hillman de las arterias. Las lecturas de la presión arterial [employer], ignacio 130/80 (se edgar \"130 sobre 80\"). El primer número indica la presión sistólica. El tracey número indica la presión diastólica. Por qué se hace esta prueba? Puede hacerse esta prueba en casa para: · Averiguar si tiene la presión arterial darío. · Llevar el registro de mcnair presión arterial si la tiene darío. · Revisar si le está funcionando corry el medicamento para reducir la presión arterial. 
· Revisar si los cambios en mcnair estilo de liv, ignacio el ejercicio y la pérdida de [place], están afectando mcnair presión arterial. 

Cómo puede prepararse para la prueba? · No consuma cafeína, tabaco ni medicamentos que eleven la presión arterial (ignacio algunos aerosoles nasales descongestivos) por lo menos 30 minutos antes de medirse la presión arterial. 
· No caitlyn ejercicio desde por lo menos 30 minutos antes de tomarse la presión arterial. 

Qué sucede antes de la prueba? Tómese la presión arterial cuando se sienta cómodo y relajado. Siéntese tranquilamente con ambos pies en el piso por lo menos 5 minutos antes de la prueba. Qué sucede jody la prueba? · Siéntese con el brazo ligeramente doblado y apoyado cómodamente en katie quintanilla de forma que la parte superior del brazo esté al nivel del corazón. · Remánguese o quítese la camisa para exponer la parte superior del brazo. · Enrolle el brazalete o manguito del tensiómetro alrededor de la parte superior del brazo de modo que el borde inferior esté aproximadamente a 1 pulgada (2.5 cm) por encima de la curva del codo. Siga los siguientes pasos dependiendo de si [place] un tensiómetro automático o manual. 
Tensiómetros automáticos · Presione el botón de prendido/apagado del tensiómetro automático y espere hasta que el símbolo de \"corazón\" listo para medir aparezca junto al cero en la pantalla. · Presione el botón de inicio. El manguito se inflará y desinflará solo. · Las cifras de mcnair presión arterial aparecerán en la pantalla. · Anote acacia cifras en mcnair libro de registro, junto con la fecha y la hora. Tensiómetros manuales · Colóquese los auriculares del estetoscopio en los oídos y la royal del estetoscopio sobre la arteria, tammy debajo del manguito. · Cierre la válvula de la perilla (bombilla de hule) para inflado. · Apriete la perilla rápidamente con la mano opuesta para inflar el manguito hasta que el dial o la columna de scott indiquen alrededor de 30 mm Hg por encima de mcnair presión sistólica habitual. Si no sabe cuál es mcnair presión habitual, infle el brazalete a 210 mm Hg o hasta que desaparezca el pulso de la [place] 1. · Jann Nestor solo un poco la válvula de presión girando o presionando la válvula de la perilla. · A medida que cornelia caer la presión lentamente, observe el nivel en el dial en el que se empieza a escuchar por primera vez katie pulsación o un golpeteo suave a través del estetoscopio. Esta es mcnair presión arterial sistólica. · Deje que siga saliendo el aire lentamente. Los sonidos se irán atenuando y finalmente desaparecerán. Observe la presión cuando los sonidos desaparezcan completamente. Esta es mcnair presión arterial diastólica. Deje que salga todo el aire restante. · Escriba las cifras en mcnair libro de registro, junto con la fecha y la hora. Qué más debería saber usted acerca de la prueba? Estas son las categorías de la presión arterial para los adultos: 
Presión arterial ideal.  
Sistólica es inferior a 499 y diastólica es inferior a [de-identified]. Presión arterial elevada. Sistólica es de 941 a 536 y diastólica es inferior a [de-identified]. Presión arterial darío (hipertensión). Sistólica es 052 o superior. Diastólica es 80 o superior. Arjun o ambos números pueden estar altos. Es más preciso vern la media de varias lecturas realizadas a lo girish del día que confiar en katie ashley Teri Sayer. La atención de seguimiento es katie parte clave de mcnair tratamiento y seguridad. Asegúrese de hacer y acudir a todas las citas, y llame a mcnair médico si está teniendo problemas. También es katie buena idea saber los resultados de los exámenes y mantener katie lista de los medicamentos que meghan. Dónde puede encontrar más información en inglés? [place] a [email or web address] Escriba C427 en la búsqueda para aprender más acerca de \"Prueba casera de presión arterial: Acerca de esta prueba - [ Home Blood Pressure Test: About This Test ]. \" 
Revisado: 6 diciembre, 2017 Versión del contenido: 11.7 © 2794-4134 Healthwise, Incorporated. Las instrucciones de cuidado fueron adaptadas bajo licencia por [employer] (which disclaims liability or warranty for this information). Si usted tiene [place] afección médica o sobre estas instrucciones, siempre pregunte a mcnair profesional de joe. Healthwise, Incorporated niega toda garantía o responsabilidad por mcnair uso de esta información.

## 2018-09-27 NOTE — PROGRESS NOTES
Chief Complaint Patient presents with  Follow-up 1. Have you been to the ER, urgent care clinic since your last visit? Hospitalized since your last visit? No 
 
2. Have you seen or consulted any other health care providers outside of the [address] [place] [address] since your last visit? Include any pap smears or colon screening. No  
 
Fall Risk Assessment, last 12 mths 9/27/2018 Able to walk? Yes Fall in past 12 months? No  
Fall with injury? -  
Number of falls in past 12 months - Fall Risk Score -  
 
PHQ over the last two weeks 9/27/2018 Little interest or pleasure in doing things Not at all Feeling down, depressed, irritable, or hopeless Not at all Total Score PHQ 2 0 Visit Vitals  /45 (BP 1 Location: Left arm, BP Patient Position: Sitting)  Pulse (!) 53  Temp 95.1 °F (35.1 °C) (Oral)  Resp 14  
 Ht 5' 3\" (1.6 m)  Wt 146 lb 3.2 oz (66.3 kg)  SpO2 99%  BMI 25.9 kg/m2 *Last eye exam was approximately 1 year ago. * Consent given for pneumonia vaccination

## 2018-09-27 NOTE — MR AVS SNAPSHOT
Perry Singleton [place] [address] [facility] [address]. [address] [address] [phone] Patient: Guillermina Guevara MRN: [id number] YIC:3/30/1894 Visit Information Erin Ortiz y Vielka Personal Médico Departamento Teléfono del Dep. Número de visita 9/27/2018  3:00 PM Sharif Herrera, [address] [phone] Follow-up Instructions Return in about 1 month (around 10/27/2018). Upcoming Health Maintenance Date Due  
 LIPID PANEL Q1 1940 FOOT EXAM Q1 6/25/1950 EYE EXAM RETINAL OR DILATED Q1 6/25/1950 DTaP/Tdap/Td series (1 - Tdap) 6/25/1961 Shingrix Vaccine Age 50> (1 of 2) 6/25/1990 GLAUCOMA SCREENING Q2Y 6/25/2005 Pneumococcal 65+ Low/Medium Risk (1 of 2 - PCV13) 6/25/2005 MEDICARE YEARLY EXAM 9/23/2018 HEMOGLOBIN A1C Q6M 1/11/2019 MICROALBUMIN Q1 9/4/2019 Alergias  Review Complete El: 9/27/2018 Por: Laron Smart LPN A partir del:  9/27/2018 No Known Allergies Vacunas actuales [facility] Ice Influenza Vaccine (Tri) Adjuvanted 9/18/2018 Pneumococcal Conjugate (PCV-13)  Incomplete No revisadas esta visita You Were Diagnosed With   
  
 Ramses Page Essential hypertension    -  Primary ICD-10-CM: I10 
ICD-9-CM: 401.9 Diabetes mellitus type II, non insulin dependent ([facility] [facility]     ICD-10-CM: E11.9 ICD-9-CM: 250.00 Encounter for immunization     ICD-10-CM: C05 ICD-9-CM: V03.89 Partes vitales PS Pulso Temperatura Resp Elba ( percentil de crecimiento) Peso (percentil de crecimiento) 147/45 (BP 1 Location: Left arm, BP Patient Position: Sitting) (!) 53 95.1 °F (35.1 °C) (Oral) 14 5' 3\" (1.6 m) 146 lb 3.2 oz (66.3 kg) SpO2 BMI (IMC) Estatus de tabaquísmo 99% 25.9 kg/m2 Never Smoker Historial de signos vitales BMI and BSA Data  Body Mass Index Body Surface Area  
 25.9 kg/m 2 1.72 m 2  
  
  
 [facility] Name Phone 509 [facility], [facility] [address] AT [address] [place] [address] & [place] NECK [phone] King lista de medicamentos actualizada Lista actualizada 9/27/18  3:09 PM.  Sailaja Glad use king lista de medicamentos más reciente. aspirin delayed-release 81 mg tablet Take 1 Tab by mouth daily. cyanocobalamin 1,000 mcg tablet También conocido ignacio:  VITAMIN B-12 Take 1 Tab by mouth daily. fluticasone 50 mcg/actuation nasal spray También conocido ignacio:  FLONASE 2 Sprays by Both Nostrils route daily. hydroCHLOROthiazide 25 mg tablet También conocido ignacio:  HYDRODIURIL Take 1 Tab by mouth daily. lisinopril 40 mg tablet También conocido ignacio:  Faina Reasons Take 1 Tab by mouth daily. pioglitazone 45 mg tablet También conocido ignacio:  ACTOS Take 1 Tab by mouth daily. simvastatin 40 mg tablet También conocido ignacio:  Johana Bamberger Take 1 Tab by mouth nightly. SITagliptin-metFORMIN  mg per tablet También conocido ignacio:  Kyleigh Butts Take 1 Tab by mouth two (2) times daily (with meals). Recetas Enviado a la Conner Refills SITagliptin-metFORMIN (JANUMET)  mg per tablet 1 Sig: Take 1 Tab by mouth two (2) times daily (with meals). Class: Normal  
 Pharmacy: [facility] [address], [address] [place] [address] [address] Ph #: [phone] Route: Oral  
 pioglitazone (ACTOS) 45 mg tablet 0 Sig: Take 1 Tab by mouth daily. Class: Normal  
 Pharmacy: [facility] [address], [address] [place] [address] [address] [facility] [address] Ph #: [phone] Route: Oral  
 hydroCHLOROthiazide (HYDRODIURIL) 25 mg tablet 1 Sig: Take 1 Tab by mouth daily.   
 Class: Normal  
 Pharmacy: [facility] [address] [place] [address], [address] [place] [address] [address]  #: [phone] Route: Oral  
  
Hicimos lo siguiente ADMIN PNEUMOCOCCAL VACCINE [ [facility]] PNEUMOCOCCAL CONJ VACCINE 13 VALENT IM [id number] CPT(R)] Instrucciones de seguimiento Return in about 1 month (around 10/27/2018). Instrucciones para el Paciente Prueba casera de presión arterial: Acerca de esta prueba - [ Home Blood Pressure Test: About This Test ] Qué es katie prueba casera de presión arterial? 
 
La prueba casera de presión arterial le permite que se caitlyn un seguimiento en casa a la presión arterial. La presión arterial es katie medida de la fuerza que ejerce la errol contra las hillman de las arterias. Las lecturas de la presión arterial [employer], ignacio 130/80 (se edgar \"130 sobre 80\"). El primer número indica la presión sistólica. El tracey número indica la presión diastólica. Por qué se hace esta prueba? Puede hacerse esta prueba en casa para: · Averiguar si tiene la presión arterial darío. · Llevar el registro de mcnair presión arterial si la tiene darío. · Revisar si le está funcionando corry el medicamento para reducir la presión arterial. 
· Revisar si los cambios en mcnair estilo de liv, ignacio el ejercicio y la pérdida de [place], están afectando mcnair presión arterial. 

Cómo puede prepararse para la prueba? · No consuma cafeína, tabaco ni medicamentos que eleven la presión arterial (ignacio algunos aerosoles nasales descongestivos) por lo menos 30 minutos antes de medirse la presión arterial. 
· No caitlyn ejercicio desde por lo menos 30 minutos antes de tomarse la presión arterial. 

Qué sucede antes de la prueba? Tómese la presión arterial cuando se sienta cómodo y relajado. Siéntese tranquilamente con ambos pies en el piso por lo menos 5 minutos antes de la prueba. Qué sucede jody la prueba?  
· Siéntese con el [place] ligeramente doblado y apoyado cómodamente en [facility] quintanilla de forma que la parte superior del brazo esté al nivel del corazón. · Remánguese o quítese la camisa para exponer la parte superior del brazo. · Enrolle el brazalete o manguito del tensiómetro alrededor de la parte superior del brazo de modo que el borde inferior esté aproximadamente a 1 pulgada (2.5 cm) por encima de la curva del codo. Siga los siguientes pasos dependiendo de si [place] un tensiómetro automático o manual. 
Tensiómetros automáticos · Presione el botón de prendido/apagado del tensiómetro automático y espere hasta que el símbolo de \"corazón\" listo para medir aparezca junto al cero en la pantalla. · Presione el botón de inicio. El manguito se inflará y desinflará solo. · Las cifras de mcnair presión arterial aparecerán en la pantalla. · Anote acacia cifras en mcnair libro de registro, junto con la fecha y la hora. Tensiómetros manuales · Colóquese los auriculares del estetoscopio en los oídos y la royal del estetoscopio sobre la arteria, tammy debajo del manguito. · Cierre la válvula de la perilla (bombilla de hule) para inflado. · Apriete la perilla rápidamente con la mano opuesta para inflar el manguito hasta que el dial o la columna de scott indiquen alrededor de 30 mm Hg por encima de mcnair presión sistólica habitual. Si no sabe cuál es mcnair presión habitual, infle el brazalete a 210 mm Hg o hasta que desaparezca el pulso de la [place] 1. · Lucita Games solo un poco la válvula de presión girando o presionando la válvula de la perilla. · A medida que cornelia caer la presión lentamente, observe el nivel en el dial en el que se empieza a escuchar por primera vez katie pulsación o un golpeteo suave a través del estetoscopio. Esta es mcnair presión arterial sistólica. · Deje que siga saliendo el aire lentamente. Los sonidos se irán atenuando y finalmente desaparecerán. Observe la presión cuando los sonidos desaparezcan completamente. Esta es mcnair presión arterial diastólica. Deje que salga todo el aire restante. · Escriba las cifras en mcnair libro de registro, junto con la fecha y la hora. Qué más debería saber usted acerca de la prueba? Estas son las categorías de la presión arterial para los adultos: 
Presión arterial ideal.  
Sistólica es inferior a 000 y diastólica es inferior a [de-identified]. Presión arterial elevada. Sistólica es de 353 a 808 y diastólica es inferior a [de-identified]. Presión arterial darío (hipertensión). Sistólica es 775 o superior. Diastólica es 80 o superior. Arjun o ambos números pueden estar altos. Es más preciso vern la media de varias lecturas realizadas a lo girish del día que confiar en katie ashley [place]. La atención de seguimiento es katie parte clave de mcnair tratamiento y seguridad. Asegúrese de hacer y acudir a todas las citas, y llame a mcnair médico si está teniendo problemas. También es katie buena idea saber los resultados de los exámenes y mantener katie lista de los medicamentos que meghan. Dónde puede encontrar más información en inglés? Randolph Leon a [email or web address] Escriba C427 en la búsqueda para aprender más acerca de \"Prueba casera de presión arterial: Acerca de esta prueba - [ Home Blood Pressure Test: About This Test ]. \" 
Revisado: 6 diciembre, 2017 Versión del contenido: 11.7 © 8625-1137 Healthwise, Incorporated. Las instrucciones de cuidado fueron adaptadas bajo licencia por [employer] (which disclaims liability or warranty for this information). Si usted tiene [place] afección médica o sobre estas instrucciones, siempre pregunte a mcnair profesional de joe. Healthwise, Incorporated niega toda garantía o responsabilidad por mcnair uso de esta información. Introducing [facility]! Estimado Jun : 
Romain por solicitar katie cuenta de MyChart raul. Nuestros registros indican que usted ya tiene katie cuenta MyChart Dima. Raul puede acceder a mcnair cuenta en cualquier momento en [email or web address] Sabía berta que usted puede acceder a mcnair hospital y las instrucciones de darío ER en cualquier momento en MyChart ? También puede revisar [employer] de las pruebas de mcnair hospitalización o visita a urgencias . Información Adicional 
 
Si tiene alguna pregunta , por favor visite la sección de preguntas frecuentes del sitio web MyChart en [email or web address] . Recuerde, MyChart NO es que se utilizará para las necesidades urgentes. Para emergencias médicas , llame al 911 . Ahora disponible en mcnair iPhone y Android ! Por favor proporcione chilango resumen de la documentación de cuidado a mcnair próximo proveedor. Your primary care clinician is listed as Mode Dowling. If you have any questions after today's visit, please call [phone].

## 2018-09-27 NOTE — PROGRESS NOTES
[facility] CC: F/U for HTN 
 
HPI:  
 
HTN: 
- Got requested lab work - Taking BP medication as prescribed - Denies any known side effects or issues - Does not check BP at home 
- Not following an exercise regimen ROS: Positive items marked in RED 
CON: fever, chills Cardiovascular: palpitations, CP Resp: SOB, cough (Occasional) GI: nausea, vomiting, diarrhea : dysuria, hematuria Past Medical History:  
Diagnosis Date  Cataract  Diabetes ([address]  Iron deficiency anemia  Tuberculosis Past Surgical History:  
Procedure Laterality Date  HX BELOW KNEE AMPUTATION Right  HX CATARACT REMOVAL    
 HX ORTHOPAEDIC    
 HX PROSTATECTOMY  HX REFRACTIVE SURGERY  2009 No family history on file. Social History Social History  Marital status:  Spouse name: N/A  
 Number of children: N/A  
 Years of education: N/A Social History Main Topics  Smoking status: Never Smoker  Smokeless tobacco: Never Used  Alcohol use No  
 Drug use: No  
 Sexual activity: No  
 
Other Topics Concern  None Social History Narrative No Known Allergies Current Outpatient Prescriptions:   cyanocobalamin (VITAMIN B-12) 1,000 mcg tablet, Take 1 Tab by mouth daily. , Disp: 90 Tab, Rfl: 0 
  simvastatin (ZOCOR) 40 mg tablet, Take 1 Tab by mouth nightly., Disp: 90 Tab, Rfl: 0 
  SITagliptin-metFORMIN (JANUMET)  mg per tablet, Take 1 Tab by mouth two (2) times daily (with meals). , Disp: 180 Tab, Rfl: 1   pioglitazone (ACTOS) 45 mg tablet, Take 1 Tab by mouth daily. , Disp: 90 Tab, Rfl: 0 
  lisinopril (PRINIVIL, ZESTRIL) 40 mg tablet, Take 1 Tab by mouth daily. , Disp: 30 Tab, Rfl: 1 
  fluticasone (FLONASE) 50 mcg/actuation nasal spray, 2 Sprays by Both Nostrils route daily. , Disp: 1 Bottle, Rfl: 8 
  aspirin delayed-release 81 mg tablet, Take 1 Tab by mouth daily. , Disp: 90 Tab, Rfl: 0 
   lisinopril (PRINIVIL, ZESTRIL) 20 mg tablet, TAKE 1 TABLET BY MOUTH DAILY, Disp: 90 Tab, Rfl: 0 
  fexofenadine (ALLEGRA) 180 mg tablet, Take 1 Tab by mouth daily. , Disp: 90 Tab, Rfl: 1 Physical Exam:  
  
/45 (BP 1 Location: Left arm, BP Patient Position: Sitting)  Pulse (!) 53  Temp 95.1 °F (35.1 °C) (Oral)   Resp 14  Ht 5' 3\" (1.6 m)  Wt 146 lb 3.2 oz (66.3 kg)  SpO2 99%  BMI 25.9 kg/m2 General:  WD, WN, NAD, conversant Eyes: sclera clear bilaterally, no discharge noted, eyelids normal in appearance HENT: NCAT Lungs: CTAB, Normal respiratory effort and rate CV: RRR, 2/6 murmur, no rubs or gallops ABD: soft, non-tender, non-distended, normal bowel sounds Ext: Left LE with trace edema, Right LE s/p BKA. Patient with prosthetic leg. Skin: normal temperature, color, and texture Psych: alert and oriented to person, place and situation, normal affect Neuro: speech normal, moving all extremities Results for Angeles Domínguez (MRN [id number]): 
 Ref. Range 9/4/2018 09:02 Creatinine, urine Latest Ref Range: 30 - 125 mg/dL 45.37 Microalbumin,urine random Latest Ref Range: 0 - 3.0 MG/DL 6.80 (H) Microalbumin/Creat. Ratio Latest Ref Range: 0 - 30 mg/g 150 (H) Assessment/Plan HTN, Inadequately Controlled: - Not at goal BP of <130/80 
- Will start on HCTZ regimen - Advised to start logging home BPs 
- Handout given on checking BP at home - Follow up in one month for HTN and DMII management Microalbuminuria: 
- On max dose of lisinopril - Will continue current regimen Health Maintenance: - Prevnar 13 today Cassie Costa MD 
9/27/2018, 2:53 PM

## 2018-10-08 DIAGNOSIS — I10 ESSENTIAL HYPERTENSION: ICD-10-CM

## 2018-10-09 RX ORDER — LISINOPRIL 40 MG/1
TABLET ORAL
Qty: 30 TAB | Refills: 0 | Status: SHIPPED | OUTPATIENT
Start: 2018-10-09 | End: 2018-11-02 | Stop reason: SDUPTHER

## 2018-10-09 NOTE — TELEPHONE ENCOUNTER
PCP not in office      Last office visit: 9/27/2018    Next appointment: 10/25/2018    Last filled on 8/20/2018  Quantity: 30 with 1 refill

## 2018-10-25 ENCOUNTER — OFFICE VISIT (OUTPATIENT)
Dept: FAMILY MEDICINE CLINIC | Age: 78
End: 2018-10-25

## 2018-10-25 VITALS
HEIGHT: 63 IN | HEART RATE: 55 BPM | DIASTOLIC BLOOD PRESSURE: 47 MMHG | WEIGHT: 147.8 LBS | SYSTOLIC BLOOD PRESSURE: 158 MMHG | OXYGEN SATURATION: 98 % | BODY MASS INDEX: 26.19 KG/M2 | TEMPERATURE: 95.5 F | RESPIRATION RATE: 18 BRPM

## 2018-10-25 DIAGNOSIS — D64.9 ANEMIA, UNSPECIFIED TYPE: ICD-10-CM

## 2018-10-25 DIAGNOSIS — I10 ESSENTIAL HYPERTENSION: Primary | ICD-10-CM

## 2018-10-25 DIAGNOSIS — E11.9 DIABETES MELLITUS TYPE II, NON INSULIN DEPENDENT (HCC): ICD-10-CM

## 2018-10-25 DIAGNOSIS — R09.89 WIDENED PULSE PRESSURE: ICD-10-CM

## 2018-10-25 LAB — HBA1C MFR BLD HPLC: 7 %

## 2018-10-25 NOTE — PROGRESS NOTES
Chief Complaint Patient presents with  Follow-up NEW HIGH BLOOD PRESSURE AFTER MONTH.  
 Diabetes 1. Have you been to the ER, urgent care clinic since your last visit? Hospitalized since your last visit? No 
 
2. Have you seen or consulted any other health care providers outside of the [address] [place] [address] since your last visit? Include any pap smears or colon screening.  No

## 2018-10-25 NOTE — PROGRESS NOTES
[address] [facility] CC: F/U HTN 
 
HPI:  
 
HTN: 
- Taking BP medication as prescribed - No side effects or issues with his medication 
- Has reduced salt intake 
- Has been check 
- Home BP range or 111-139/45-90 
- Many of the BPs recorded show a widened pulse pressure-  
 
 
ROS: Positive items marked in RED 
CON: fever, chills Cardiovascular: palpitations, CP Resp: SOB, cough GI: nausea, vomiting, diarrhea : dysuria, hematuria Past Medical History:  
Diagnosis Date  Cataract  Diabetes ([facility] [address]  Iron deficiency anemia  Tuberculosis Past Surgical History:  
Procedure Laterality Date  HX BELOW KNEE AMPUTATION Right  HX CATARACT REMOVAL    
 HX ORTHOPAEDIC    
 HX PROSTATECTOMY  HX REFRACTIVE SURGERY  2009 History reviewed. No pertinent family history. Social History Socioeconomic History  Marital status:  Spouse name: Not on file  Number of children: Not on file  Years of education: Not on file  Highest education level: Not on file Social Needs  Financial resource strain: Not on file  Food insecurity - worry: Not on file  Food insecurity - inability: Not on file  Transportation needs - medical: Not on file  Transportation needs - non-medical: Not on file Occupational History  Not on file Tobacco Use  Smoking status: Never Smoker  Smokeless tobacco: Never Used Substance and Sexual Activity  Alcohol use: No  
 Drug use: No  
 Sexual activity: No  
Other Topics Concern  Not on file Social History Narrative  Not on file No Known Allergies Current Outpatient Medications:  
  lisinopril (PRINIVIL, ZESTRIL) 40 mg tablet, TAKE 1 TABLET BY MOUTH DAILY, Disp: 30 Tab, Rfl: 0 
  SITagliptin-metFORMIN (JANUMET)  mg per tablet, Take 1 Tab by mouth two (2) times daily (with meals). , Disp: 180 Tab, Rfl: 1   pioglitazone (ACTOS) 45 mg tablet, Take 1 Tab by mouth daily. , Disp: 90 Tab, Rfl: 0 
  hydroCHLOROthiazide (HYDRODIURIL) 25 mg tablet, Take 1 Tab by mouth daily. , Disp: 30 Tab, Rfl: 1   cyanocobalamin (VITAMIN B-12) 1,000 mcg tablet, Take 1 Tab by mouth daily. , Disp: 90 Tab, Rfl: 0 
  simvastatin (ZOCOR) 40 mg tablet, Take 1 Tab by mouth nightly., Disp: 90 Tab, Rfl: 0 
  fluticasone (FLONASE) 50 mcg/actuation nasal spray, 2 Sprays by Both Nostrils route daily. , Disp: 1 Bottle, Rfl: 8 
  aspirin delayed-release 81 mg tablet, Take 1 Tab by mouth daily. , Disp: 90 Tab, Rfl: 0 Physical Exam:  
  
/47   Pulse (!) 55   Temp 95.5 °F (35.3 °C) (Oral)   Resp 18   Ht 5' 3\" (1.6 m)   Wt 147 lb 12.8 oz (67 kg)   SpO2 98%   BMI 26.18 kg/m² General:  WD, WN, NAD, conversant Eyes: sclera clear bilaterally, no discharge noted, eyelids normal in appearance HENT: NCAT Lungs: CTAB, normal respiratory effort and rate CV: RRR, no MRGs ABD: soft, non-tender, non-distended, normal bowel sounds Skin: normal temperature, turgor, color, and texture Psych: alert and oriented to person, place and situation, normal affect Neuro: speech normal, moving all extremities, gait normal 
 
Results for Pancho Brand (MRN [id number]): 
 Ref. Range 10/25/2018 15:56 Hemoglobin A1c (POC) Latest Units: % 7.0 Assessment/Plan HTN, Inadequately Controlled - Not at goal BP of <130/80 
- Will hold off his  from increasing his Given patient's multiple diastolic pressure in the hypotensive range - Will check CBC 
- F/U in one month Widened Pulse Pressure: - Will refer to cardiology for evaluation/recommendations - F/U in one month DMII, Improving: 
- HGBA1c near gaol of <7% - Will continue current regimen - F/U in one month Shiraz Palacio MD 
10/25/2018, 3:27 PM

## 2018-11-02 DIAGNOSIS — I10 ESSENTIAL HYPERTENSION: ICD-10-CM

## 2018-11-02 RX ORDER — LISINOPRIL 40 MG/1
TABLET ORAL
Qty: 30 TAB | Refills: 0 | Status: SHIPPED | OUTPATIENT
Start: 2018-11-02 | End: 2018-11-29 | Stop reason: SDUPTHER

## 2018-11-12 DIAGNOSIS — E11.9 CONTROLLED TYPE 2 DIABETES MELLITUS WITHOUT COMPLICATION, WITHOUT LONG-TERM CURRENT USE OF INSULIN (HCC): ICD-10-CM

## 2018-11-12 RX ORDER — PIOGLITAZONEHYDROCHLORIDE 45 MG/1
TABLET ORAL
Qty: 90 TAB | Refills: 0 | Status: SHIPPED | OUTPATIENT
Start: 2018-11-12 | End: 2019-02-04 | Stop reason: SDUPTHER

## 2018-11-12 RX ORDER — SIMVASTATIN 40 MG/1
TABLET, FILM COATED ORAL
Qty: 90 TAB | Refills: 0 | Status: SHIPPED | OUTPATIENT
Start: 2018-11-12 | End: 2019-02-04 | Stop reason: SDUPTHER

## 2018-11-29 DIAGNOSIS — I10 ESSENTIAL HYPERTENSION: ICD-10-CM

## 2018-11-30 ENCOUNTER — OFFICE VISIT (OUTPATIENT)
Dept: CARDIOLOGY CLINIC | Age: 78
End: 2018-11-30

## 2018-11-30 VITALS
WEIGHT: 146 LBS | BODY MASS INDEX: 25.87 KG/M2 | HEART RATE: 65 BPM | SYSTOLIC BLOOD PRESSURE: 153 MMHG | OXYGEN SATURATION: 96 % | HEIGHT: 63 IN | DIASTOLIC BLOOD PRESSURE: 54 MMHG

## 2018-11-30 DIAGNOSIS — E11.9 DIABETES MELLITUS TYPE II, NON INSULIN DEPENDENT (HCC): ICD-10-CM

## 2018-11-30 DIAGNOSIS — I10 ESSENTIAL HYPERTENSION: Primary | ICD-10-CM

## 2018-11-30 DIAGNOSIS — E78.5 DYSLIPIDEMIA: ICD-10-CM

## 2018-11-30 DIAGNOSIS — E11.21 TYPE 2 DIABETES WITH NEPHROPATHY (HCC): ICD-10-CM

## 2018-11-30 NOTE — PROGRESS NOTES
1. Have you been to the ER, urgent care clinic since your last visit? Hospitalized since your last visit? No    2. Have you seen or consulted any other health care providers outside of the [address] [place] [address] since your last visit? Include any pap smears or colon screening. No

## 2018-12-03 RX ORDER — LISINOPRIL 40 MG/1
TABLET ORAL
Qty: 30 TAB | Refills: 0 | Status: SHIPPED | OUTPATIENT
Start: 2018-12-03 | End: 2019-02-22 | Stop reason: SDUPTHER

## 2018-12-08 PROBLEM — E78.5 DYSLIPIDEMIA: Status: ACTIVE | Noted: 2018-12-08

## 2018-12-08 PROBLEM — E11.21 TYPE 2 DIABETES WITH NEPHROPATHY (HCC): Status: ACTIVE | Noted: 2018-12-08

## 2018-12-08 NOTE — PROGRESS NOTES
Subjective:      Mr. Villalobos Hasting in the office today for cardiac evaluation. He is a 77-year-old man that was diagnosed with hypertension \"a long time ago \". He reports that his blood pressure is usually well controlled. He does not exercise per se but stays physically active. He walks around his yard and stays busy. He has had no dyspnea on exertion or chest pain. He sleeps on one pillow. He has had no PND. He has been trying to eat better and watch his diet more closely. He has no specific complaints in the office today. Patient's cardiac risk factors are diabetes, hypertension and dyslipidemia. .        Patient Active Problem List    Diagnosis Date Noted    [facility] 12/08/2018    Dyslipidemia 12/08/2018    Diabetes mellitus type II, non insulin dependent ([facility] 09/27/2018    Essential hypertension 09/27/2018    Microalbuminuria 09/27/2018     Current Outpatient Medications   Medication Sig Dispense Refill    lisinopril (PRINIVIL, ZESTRIL) 40 mg tablet TAKE 1 TABLET BY MOUTH DAILY 30 Tab 0    hydroCHLOROthiazide (HYDRODIURIL) 25 mg tablet TAKE 1 TABLET BY MOUTH DAILY 90 Tab 1    simvastatin (ZOCOR) 40 mg tablet TAKE 1 TABLET BY MOUTH EVERY NIGHT 90 Tab 0    pioglitazone (ACTOS) 45 mg tablet TAKE 1 TABLET BY MOUTH DAILY 90 Tab 0    SITagliptin-metFORMIN (JANUMET)  mg per tablet Take 1 Tab by mouth two (2) times daily (with meals). 180 Tab 1    cyanocobalamin (VITAMIN B-12) 1,000 mcg tablet Take 1 Tab by mouth daily. 90 Tab 0    fluticasone (FLONASE) 50 mcg/actuation nasal spray 2 Sprays by Both Nostrils route daily. 1 Bottle 8    aspirin delayed-release 81 mg tablet Take 1 Tab by mouth daily.  90 Tab 0     No Known Allergies  Past Medical History:   Diagnosis Date    Cataract     Diabetes ([facility]     Iron deficiency anemia     Tuberculosis      Past Surgical History:   Procedure Laterality Date    HX BELOW KNEE AMPUTATION Right     HX CATARACT REMOVAL      HX ORTHOPAEDIC      HX PROSTATECTOMY      HX REFRACTIVE SURGERY  2009     No family history on file. Social History     Tobacco Use   Smoking Status Never Smoker   Smokeless Tobacco Never Used          Review of Systems, additional:  Constitutional: negative  Eyes: negative  Respiratory: negative  Cardiovascular: negative  Gastrointestinal: negative  Musculoskeletal:negative  Neurological: negative  Behvioral/Psych: negative  Endocrine: negative  ENT: negative    Objective:     Visit Vitals  /54   Pulse 65   Ht 5' 3\" (1.6 m)   Wt 146 lb (66.2 kg)   SpO2 96%   BMI 25.86 kg/m²     General:  alert, cooperative, no distress   Chest Wall: inspection normal - no chest wall deformities or tenderness, respiratory effort normal   Lung: clear to auscultation bilaterally   Heart:  normal rate and regular rhythm, S1 and S2 normal, no murmurs noted, S4 present   Abdomen: soft, non-tender. Bowel sounds normal. No masses,  no organomegaly   Extremities: extremities normal, atraumatic, no cyanosis or edema Skin: no rashes   Neuro: alert, oriented, normal speech, no focal findings or movement disorder noted         Assessment/Plan:       ICD-10-CM ICD-9-CM    1. Essential hypertension, elevated systolic pressure in the office today. The patient has mild concentric left ventricular hypertrophy and mild diastolic dysfunction on recent echocardiogram 8/2/2018. He was instructed to keep a blood pressure log and return in 2 months. I10 401.9    2. Diabetes mellitus type II, non insulin dependent (HCC) E11.9 250.00    3. Type 2 diabetes with nephropathy (HCC) E11.21 250.40      583.81    4.  Dyslipidemia E78.5 272.4

## 2019-02-04 DIAGNOSIS — E11.9 CONTROLLED TYPE 2 DIABETES MELLITUS WITHOUT COMPLICATION, WITHOUT LONG-TERM CURRENT USE OF INSULIN (HCC): ICD-10-CM

## 2019-02-04 RX ORDER — SIMVASTATIN 40 MG/1
TABLET, FILM COATED ORAL
Qty: 90 TAB | Refills: 0 | Status: SHIPPED | OUTPATIENT
Start: 2019-02-04 | End: 2019-05-03 | Stop reason: SDUPTHER

## 2019-02-04 RX ORDER — PIOGLITAZONEHYDROCHLORIDE 45 MG/1
TABLET ORAL
Qty: 90 TAB | Refills: 0 | Status: SHIPPED | OUTPATIENT
Start: 2019-02-04 | End: 2019-05-03 | Stop reason: SDUPTHER

## 2019-02-22 ENCOUNTER — OFFICE VISIT (OUTPATIENT)
Dept: CARDIOLOGY CLINIC | Age: 79
End: 2019-02-22

## 2019-02-22 VITALS
WEIGHT: 145 LBS | HEIGHT: 63 IN | BODY MASS INDEX: 25.69 KG/M2 | SYSTOLIC BLOOD PRESSURE: 172 MMHG | DIASTOLIC BLOOD PRESSURE: 63 MMHG | OXYGEN SATURATION: 100 % | HEART RATE: 53 BPM

## 2019-02-22 DIAGNOSIS — I10 ESSENTIAL HYPERTENSION: ICD-10-CM

## 2019-02-22 DIAGNOSIS — E78.5 DYSLIPIDEMIA: ICD-10-CM

## 2019-02-22 DIAGNOSIS — E11.21 TYPE 2 DIABETES WITH NEPHROPATHY (HCC): Primary | ICD-10-CM

## 2019-02-22 RX ORDER — LISINOPRIL 40 MG/1
TABLET ORAL
Qty: 90 TAB | Refills: 3 | Status: SHIPPED | OUTPATIENT
Start: 2019-02-22 | End: 2020-02-28

## 2019-02-22 NOTE — PROGRESS NOTES
1. Have you been to the ER, urgent care clinic since your last visit? Hospitalized since your last visit? No  2. Have you seen or consulted any other health care providers outside of the [address] [place] [address] since your last visit? Include any pap smears or colon screening.  No

## 2019-02-24 NOTE — PROGRESS NOTES
Subjective:      Mr. Miles Haddad in the office today for cardiac evaluation. He is a 77-year-old man that was diagnosed with hypertension \"a long time ago \". At his initial appointment on 11/30/18, he reported that his blood pressure was usually well controlled. He related that he did not exercise per se but remained physically active. He walks around his yard and stays busy. He  had no dyspnea on exertion or chest pain. He sleeps on one pillow. He  had no PND. He was trying to eat better and watch his diet more closely. He had no specific complaints at that time. The office today brought log of his blood pressures. There were at least 50 entries. Systolic blood pressure was generally between 120 and 130 mmHg. He reported that he feels \"good\". He has had no chest pain, shortness of breath, peripheral swelling, etc.    Patient's cardiac risk factors are diabetes, hypertension and dyslipidemia. .        Patient Active Problem List    Diagnosis Date Noted    Type 2 diabetes with nephropathy ([facility] 12/08/2018    Dyslipidemia 12/08/2018    Diabetes mellitus type II, non insulin dependent ([facility] 09/27/2018    Essential hypertension 09/27/2018    Microalbuminuria 09/27/2018     Current Outpatient Medications   Medication Sig Dispense Refill    lisinopril (PRINIVIL, ZESTRIL) 40 mg tablet TAKE 1 TABLET BY MOUTH DAILY 90 Tab 3    simvastatin (ZOCOR) 40 mg tablet TAKE 1 TABLET BY MOUTH EVERY NIGHT 90 Tab 0    pioglitazone (ACTOS) 45 mg tablet TAKE 1 TABLET BY MOUTH DAILY 90 Tab 0    aspirin delayed-release 81 mg tablet TAKE 1 TABLET BY MOUTH ONCE DAILY 90 Tab 0    hydroCHLOROthiazide (HYDRODIURIL) 25 mg tablet TAKE 1 TABLET BY MOUTH DAILY 90 Tab 1    SITagliptin-metFORMIN (JANUMET)  mg per tablet Take 1 Tab by mouth two (2) times daily (with meals). 180 Tab 1    cyanocobalamin (VITAMIN B-12) 1,000 mcg tablet Take 1 Tab by mouth daily.  90 Tab 0    fluticasone (FLONASE) 50 mcg/actuation nasal spray 2 Sprays by Both Nostrils route daily. 1 Bottle 8     No Known Allergies  Past Medical History:   Diagnosis Date    Cataract     Diabetes ([address]     Iron deficiency anemia     Tuberculosis      Past Surgical History:   Procedure Laterality Date    HX BELOW KNEE AMPUTATION Right     HX CATARACT REMOVAL      HX ORTHOPAEDIC      HX PROSTATECTOMY      HX REFRACTIVE SURGERY  2009     No family history on file. Social History     Tobacco Use   Smoking Status Never Smoker   Smokeless Tobacco Never Used          Review of Systems, additional:  Constitutional: negative  Eyes: negative  Respiratory: negative  Cardiovascular: negative  Gastrointestinal: negative  Musculoskeletal:negative  Neurological: negative  Behvioral/Psych: negative  Endocrine: negative  ENT: negative    Objective:     Visit Vitals  /63   Pulse (!) 53   Ht 5' 3\" (1.6 m)   Wt 145 lb (65.8 kg)   SpO2 100%   BMI 25.69 kg/m²     General:  alert, cooperative, no distress   Chest Wall: inspection normal - no chest wall deformities or tenderness, respiratory effort normal   Lung: clear to auscultation bilaterally   Heart:  normal rate and regular rhythm, S1 and S2 normal, no murmurs noted, S4 present   Abdomen: soft, non-tender. Bowel sounds normal. No masses,  no organomegaly   Extremities: extremities normal, atraumatic, no cyanosis or edema Skin: no rashes   Neuro: alert, oriented, normal speech, no focal findings or movement disorder noted         Assessment/Plan:       ICD-10-CM ICD-9-CM    1. Essential hypertension, elevated systolic pressure in the office again today. The patient has mild concentric left ventricular hypertrophy and mild diastolic dysfunction on  echocardiogram 8/2/2018. He brought a blood pressure log to visit today. His blood pressures are  acceptable. He will follow-up with Dr. Stefano Henry. Plan to see him back in 1 year. I10 401.9    2. Diabetes mellitus type II, non insulin dependent (HCC) E11.9 250.00    3.  Type 2 diabetes with nephropathy ([facility] E11.21 250.40      583.81    4.  Dyslipidemia E78.5 272.4

## 2019-03-13 ENCOUNTER — OFFICE VISIT (OUTPATIENT)
Dept: FAMILY MEDICINE CLINIC | Age: 79
End: 2019-03-13

## 2019-03-13 VITALS
RESPIRATION RATE: 14 BRPM | HEIGHT: 63 IN | SYSTOLIC BLOOD PRESSURE: 139 MMHG | OXYGEN SATURATION: 100 % | DIASTOLIC BLOOD PRESSURE: 42 MMHG | WEIGHT: 143 LBS | HEART RATE: 60 BPM | TEMPERATURE: 96.3 F | BODY MASS INDEX: 25.34 KG/M2

## 2019-03-13 DIAGNOSIS — I10 ESSENTIAL HYPERTENSION: ICD-10-CM

## 2019-03-13 DIAGNOSIS — E11.9 DIABETES MELLITUS TYPE II, NON INSULIN DEPENDENT (HCC): Primary | ICD-10-CM

## 2019-03-13 LAB — HBA1C MFR BLD HPLC: 7.3 %

## 2019-03-13 NOTE — PROGRESS NOTES
Chief Complaint   Patient presents with    Follow Up Chronic Condition     1 month follow up on HTN, DMII and widened pulse pressure     1. Have you been to the ER, urgent care clinic since your last visit? Hospitalized since your last visit? No.    2. Have you seen or consulted any other health care providers outside of the [address] [place] [address] since your last visit? Saw cardiologist on 2/22/2019.     Visit Vitals  /42   Pulse 60   Temp 96.3 °F (35.7 °C) (Oral)   Resp 14   Ht 5' 3\" (1.6 m)   Wt 143 lb (64.9 kg)   SpO2 100%   BMI 25.33 kg/m²

## 2019-03-25 NOTE — PROGRESS NOTES
[facility]    CC: F/U for HTN    HPI:     HTN:  -Taking BP medication as prescribed  -No side effects or issues with his medication  -Saw cardiologist   -Home log reviewed and BP was determined to be in acceptable range  -Recommended he remain on current medication regimen   -Patient will follow up in one year      DMII:  -Taking medication as prescribed  -Denies any side effects or issue with his medication  -Has not been following diabetic diet      ROS: Positive items marked in RED  CON: fever, chills  Cardiovascular: palpitations, CP  Resp: SOB, cough  GI: nausea, vomiting, diarrhea  : dysuria, hematuria      Past Medical History:   Diagnosis Date    Cataract     Diabetes ([address]     Iron deficiency anemia     Tuberculosis        Past Surgical History:   Procedure Laterality Date    HX BELOW KNEE AMPUTATION Right     HX CATARACT REMOVAL      HX ORTHOPAEDIC      HX PROSTATECTOMY      HX REFRACTIVE SURGERY  2009       History reviewed. No pertinent family history.     Social History     Socioeconomic History    Marital status:      Spouse name: Not on file    Number of children: Not on file    Years of education: Not on file    Highest education level: Not on file   Tobacco Use    Smoking status: Never Smoker    Smokeless tobacco: Never Used   Substance and Sexual Activity    Alcohol use: No    Drug use: No    Sexual activity: Never       No Known Allergies      Current Outpatient Medications:     lisinopril (PRINIVIL, ZESTRIL) 40 mg tablet, TAKE 1 TABLET BY MOUTH DAILY, Disp: 90 Tab, Rfl: 3    simvastatin (ZOCOR) 40 mg tablet, TAKE 1 TABLET BY MOUTH EVERY NIGHT, Disp: 90 Tab, Rfl: 0    pioglitazone (ACTOS) 45 mg tablet, TAKE 1 TABLET BY MOUTH DAILY, Disp: 90 Tab, Rfl: 0    aspirin delayed-release 81 mg tablet, TAKE 1 TABLET BY MOUTH ONCE DAILY, Disp: 90 Tab, Rfl: 0    hydroCHLOROthiazide (HYDRODIURIL) 25 mg tablet, TAKE 1 TABLET BY MOUTH DAILY, Disp: 90 Tab, Rfl: 1   SITagliptin-metFORMIN (JANUMET)  mg per tablet, Take 1 Tab by mouth two (2) times daily (with meals). , Disp: 180 Tab, Rfl: 1    cyanocobalamin (VITAMIN B-12) 1,000 mcg tablet, Take 1 Tab by mouth daily. , Disp: 90 Tab, Rfl: 0    fluticasone (FLONASE) 50 mcg/actuation nasal spray, 2 Sprays by Both Nostrils route daily. , Disp: 1 Bottle, Rfl: 8    Physical Exam:      /42   Pulse 60   Temp 96.3 °F (35.7 °C) (Oral)   Resp 14   Ht 5' 3\" (1.6 m)   Wt 143 lb (64.9 kg)   SpO2 100%   BMI 25.33 kg/m²     General:  WD, WN, NAD, conversant  Eyes: sclera clear bilaterally, no discharge noted, eyelids normal in appearance  HENT: NCAT  Lungs: CTAB, normal respiratory effort and rate  CV: RRR, no MRGs  ABD: soft, non-tender, non-distended, normal bowel sounds  Skin: normal temperature, turgor, color, and texture  Psych: alert and oriented to person, place and situation, normal affect  Neuro: speech normal, moving all extremities, gait normal    Results for Linda Ghosh (MRN [id number]):   Ref.  Range 3/13/2019 16:30   Hemoglobin A1c (POC) Latest Units: % 7.3       Assessment/Plan     HTN, Stable:  -Not at goal BP of <130/80  -Will defer to recommendations of cardiologist, given patient low diastolic BP  -F/U in 3 months for chronic disease management      DMII, Worsening:  HGBA1c has increased to 7.3%  -Likely 2/2 not following dietary recommendations  -Advised to resume following dietary recommendations  -F/U in 3 months for chronic disease management        Jenniffer Loving MD  3/25/2019, 1:49 AM

## 2019-04-22 DIAGNOSIS — I10 ESSENTIAL HYPERTENSION: ICD-10-CM

## 2019-04-22 RX ORDER — HYDROCHLOROTHIAZIDE 25 MG/1
TABLET ORAL
Qty: 90 TAB | Refills: 0 | Status: SHIPPED | OUTPATIENT
Start: 2019-04-22 | End: 2019-07-10 | Stop reason: SDUPTHER

## 2019-05-03 DIAGNOSIS — E11.9 CONTROLLED TYPE 2 DIABETES MELLITUS WITHOUT COMPLICATION, WITHOUT LONG-TERM CURRENT USE OF INSULIN (HCC): ICD-10-CM

## 2019-05-03 RX ORDER — SIMVASTATIN 40 MG/1
TABLET, FILM COATED ORAL
Qty: 90 TAB | Refills: 0 | Status: SHIPPED | OUTPATIENT
Start: 2019-05-03 | End: 2019-07-22 | Stop reason: SDUPTHER

## 2019-05-14 DIAGNOSIS — E11.9 CONTROLLED TYPE 2 DIABETES MELLITUS WITHOUT COMPLICATION, WITHOUT LONG-TERM CURRENT USE OF INSULIN (HCC): ICD-10-CM

## 2019-05-16 RX ORDER — ASPIRIN 81 MG/1
TABLET ORAL
Qty: 90 TAB | Refills: 0 | Status: SHIPPED | OUTPATIENT
Start: 2019-05-16 | End: 2019-07-22 | Stop reason: SDUPTHER

## 2019-06-13 ENCOUNTER — OFFICE VISIT (OUTPATIENT)
Dept: FAMILY MEDICINE CLINIC | Age: 79
End: 2019-06-13

## 2019-06-13 VITALS
WEIGHT: 148 LBS | BODY MASS INDEX: 26.22 KG/M2 | HEART RATE: 54 BPM | RESPIRATION RATE: 12 BRPM | HEIGHT: 63 IN | DIASTOLIC BLOOD PRESSURE: 41 MMHG | SYSTOLIC BLOOD PRESSURE: 143 MMHG | OXYGEN SATURATION: 98 % | TEMPERATURE: 96.7 F

## 2019-06-13 DIAGNOSIS — I10 ESSENTIAL HYPERTENSION: ICD-10-CM

## 2019-06-13 DIAGNOSIS — E11.9 DIABETES MELLITUS TYPE II, NON INSULIN DEPENDENT (HCC): ICD-10-CM

## 2019-06-13 DIAGNOSIS — E11.9 DIABETES MELLITUS TYPE II, NON INSULIN DEPENDENT (HCC): Primary | ICD-10-CM

## 2019-06-13 DIAGNOSIS — E78.5 HYPERLIPIDEMIA, UNSPECIFIED HYPERLIPIDEMIA TYPE: ICD-10-CM

## 2019-06-13 LAB — HBA1C MFR BLD HPLC: 7.2 %

## 2019-06-13 RX ORDER — METFORMIN HYDROCHLORIDE 1000 MG/1
1000 TABLET ORAL 2 TIMES DAILY WITH MEALS
Qty: 180 TAB | Refills: 2 | Status: SHIPPED | OUTPATIENT
Start: 2019-06-13 | End: 2020-02-17

## 2019-06-13 RX ORDER — GLIMEPIRIDE 1 MG/1
1 TABLET ORAL
Qty: 30 TAB | Refills: 3 | Status: SHIPPED | OUTPATIENT
Start: 2019-06-13 | End: 2019-06-13 | Stop reason: SDUPTHER

## 2019-06-13 NOTE — PROGRESS NOTES
Deric [facility]far[facility]    CC: Follow-up for chronic disease management    HPI:       DMII:  -Taking diabetic medications prescribed  -Denies any side effects with medication  -Reports Janumet is too expensive and he would like to switch to a different medication  -Has been checking blood sugar at home. No log brought in for review. -Reports FBS is in 100s-120s  -Does not follow a regular exercise regimen      Hypertension:  -Seeing cardiology  -Last saw cardiology on 2/24/2019  -Cardiology cleared blood pressure control based on home readings  -Taking blood pressure medications prescribed  -Denies any side effects or issues with blood pressure medication  -Does not follow a regular exercise regimen      Hyperlipidemia:  -Taking statin as prescribed  -Denies any side effects or issues with the medication  -Does not follow a regular exercise regimen      ROS: Positive items marked in RED  CON: fever, chills  Cardiovascular: palpitations, CP  Resp: SOB, cough  GI: nausea, vomiting, diarrhea  : dysuria, hematuria      Past Medical History:   Diagnosis Date    Cataract     [facility]     Iron deficiency anemia     Tuberculosis        Past Surgical History:   Procedure Laterality Date    HX BELOW KNEE AMPUTATION Right     HX CATARACT REMOVAL      HX ORTHOPAEDIC      HX PROSTATECTOMY      HX REFRACTIVE SURGERY  2009       No family history on file.     Social History     Socioeconomic History    Marital status:      Spouse name: Not on file    Number of children: Not on file    Years of education: Not on file    Highest education level: Not on file   Tobacco Use    Smoking status: Never Smoker    Smokeless tobacco: Never Used   Substance and Sexual Activity    Alcohol use: No    Drug use: No    Sexual activity: Never       No Known Allergies      Current Outpatient Medications:     aspirin delayed-release 81 mg tablet, TAKE 1 TABLET BY MOUTH ONCE DAILY, Disp: 90 Tab, Rfl: 0   pioglitazone (ACTOS) 45 mg tablet, TAKE 1 TABLET BY MOUTH DAILY, Disp: 90 Tab, Rfl: 1    simvastatin (ZOCOR) 40 mg tablet, TAKE 1 TABLET BY MOUTH EVERY NIGHT, Disp: 90 Tab, Rfl: 0    hydroCHLOROthiazide (HYDRODIURIL) 25 mg tablet, TAKE 1 TABLET BY MOUTH DAILY, Disp: 90 Tab, Rfl: 0    lisinopril (PRINIVIL, ZESTRIL) 40 mg tablet, TAKE 1 TABLET BY MOUTH DAILY, Disp: 90 Tab, Rfl: 3    SITagliptin-metFORMIN (JANUMET)  mg per tablet, Take 1 Tab by mouth two (2) times daily (with meals). , Disp: 180 Tab, Rfl: 1    cyanocobalamin (VITAMIN B-12) 1,000 mcg tablet, Take 1 Tab by mouth daily. , Disp: 90 Tab, Rfl: 0    fluticasone (FLONASE) 50 mcg/actuation nasal spray, 2 Sprays by Both Nostrils route daily. , Disp: 1 Bottle, Rfl: 8    Physical Exam:      /41   Pulse (!) 54   Temp 96.7 °F (35.9 °C) (Oral)   Resp 12   Ht 5' 3\" (1.6 m)   Wt 148 lb (67.1 kg)   SpO2 98%   BMI 26.22 kg/m²     General:  WD, WN, NAD, conversant  Eyes: sclera clear bilaterally, no discharge noted, eyelids normal in appearance  HENT: NCAT  Lungs: CTAB, normal respiratory effort and rate  CV: RRR, no MRGs  ABD: soft, non-tender, non-distended, normal bowel sounds  Ext: no peripheral edema or digital cyanosis noted  Skin: normal temperature, turgor, color, and texture  Psych: alert and oriented to person, place and situation, normal affect  Neuro: speech normal, moving all extremities, gait normal    Diabetic foot exam:     Left Foot:   Visual Exam: normal    Pulse DP: 2+ (normal)   Filament test: absent sensation    Vibratory sensation: absent    Proprioception: normal      Right Foot:   N/A. S/P BKA    Results for Nano Hay (MRN [id number]):   Ref.  Range 6/13/2019 15:31   Hemoglobin A1c (POC) Latest Units: % 7.2       Assessment/Plan     DMII, inadequately controlled:  -Hemoglobin A1c not at goal of less than 7%  -Janumet discontinued due to cost  -Will start on 1 mg of Amaryl and 1000 mg of Metformin BID  -Diabetic foot exam done today  -Referred to podiatry due to abnormal foot exam  -CBC, CMP, fasting lipid panel, and urine microalbumin/creatinine ordered  -Follow-up in 3 months      Hypertension:  -Patient with labile BP  -Will defer to recommendations of cardiology leave patient on current regimen  -CBC, CMP, fasting lipid panel, and urine microalbumin/creatinine ordered  -Follow-up in 3 months      Hyperlipidemia:  -Will continue current statin regimen  -Fasting lipid panel ordered  -Follow-up in 3 months        Kyle Mejia MD  6/13/2019, 3:33 PM

## 2019-06-13 NOTE — PROGRESS NOTES
1. Have you been to the ER, urgent care clinic since your last visit? Hospitalized since your last visit? No    2. Have you seen or consulted any other health care providers outside of the [address] [place] [address] since your last visit? Include any pap smears or colon screening.  No

## 2019-06-14 RX ORDER — GLIMEPIRIDE 1 MG/1
TABLET ORAL
Qty: 90 TAB | Refills: 3 | Status: SHIPPED | OUTPATIENT
Start: 2019-06-14 | End: 2019-09-17 | Stop reason: SDUPTHER

## 2019-06-28 PROBLEM — E78.5 HYPERLIPIDEMIA: Status: ACTIVE | Noted: 2019-06-28

## 2019-07-10 DIAGNOSIS — I10 ESSENTIAL HYPERTENSION: ICD-10-CM

## 2019-07-11 RX ORDER — HYDROCHLOROTHIAZIDE 25 MG/1
TABLET ORAL
Qty: 90 TAB | Refills: 0 | Status: SHIPPED | OUTPATIENT
Start: 2019-07-11 | End: 2019-10-09 | Stop reason: SDUPTHER

## 2019-07-22 DIAGNOSIS — E11.9 CONTROLLED TYPE 2 DIABETES MELLITUS WITHOUT COMPLICATION, WITHOUT LONG-TERM CURRENT USE OF INSULIN (HCC): ICD-10-CM

## 2019-07-22 DIAGNOSIS — E11.9 DIABETES MELLITUS TYPE II, NON INSULIN DEPENDENT (HCC): ICD-10-CM

## 2019-07-22 RX ORDER — SIMVASTATIN 40 MG/1
TABLET, FILM COATED ORAL
Qty: 90 TAB | Refills: 0 | Status: SHIPPED | OUTPATIENT
Start: 2019-07-22 | End: 2019-10-15 | Stop reason: SDUPTHER

## 2019-07-22 RX ORDER — PIOGLITAZONEHYDROCHLORIDE 45 MG/1
TABLET ORAL
Qty: 90 TAB | Refills: 0 | Status: SHIPPED | OUTPATIENT
Start: 2019-07-22 | End: 2019-09-17 | Stop reason: DRUGHIGH

## 2019-07-22 RX ORDER — ASPIRIN 81 MG/1
TABLET ORAL
Qty: 90 TAB | Refills: 0 | Status: SHIPPED | OUTPATIENT
Start: 2019-07-22 | End: 2019-10-20 | Stop reason: SDUPTHER

## 2019-09-11 LAB
ALBUMIN SERPL-MCNC: 4.2 G/DL (ref 3.5–4.8)
ALBUMIN/CREAT UR: 95.1 MG/G CREAT (ref 0–30)
ALBUMIN/GLOB SERPL: 1.6 {RATIO} (ref 1.2–2.2)
ALP SERPL-CCNC: 58 IU/L (ref 39–117)
ALT SERPL-CCNC: 8 IU/L (ref 0–44)
AST SERPL-CCNC: 15 IU/L (ref 0–40)
BASOPHILS # BLD AUTO: 0 X10E3/UL (ref 0–0.2)
BASOPHILS NFR BLD AUTO: 0 %
BILIRUB SERPL-MCNC: <0.2 MG/DL (ref 0–1.2)
BUN SERPL-MCNC: 32 MG/DL (ref 8–27)
BUN/CREAT SERPL: 28 (ref 10–24)
CALCIUM SERPL-MCNC: 9.5 MG/DL (ref 8.6–10.2)
CHLORIDE SERPL-SCNC: 109 MMOL/L (ref 96–106)
CHOLEST SERPL-MCNC: 151 MG/DL (ref 100–199)
CO2 SERPL-SCNC: 23 MMOL/L (ref 20–29)
CREAT SERPL-MCNC: 1.14 MG/DL (ref 0.76–1.27)
CREAT UR-MCNC: 48.8 MG/DL
EOSINOPHIL # BLD AUTO: 0.1 X10E3/UL (ref 0–0.4)
EOSINOPHIL NFR BLD AUTO: 4 %
ERYTHROCYTE [DISTWIDTH] IN BLOOD BY AUTOMATED COUNT: 12.7 % (ref 12.3–15.4)
GLOBULIN SER CALC-MCNC: 2.7 G/DL (ref 1.5–4.5)
GLUCOSE SERPL-MCNC: 90 MG/DL (ref 65–99)
HCT VFR BLD AUTO: 25.9 % (ref 37.5–51)
HDLC SERPL-MCNC: 73 MG/DL
HGB BLD-MCNC: 8.7 G/DL (ref 13–17.7)
IMM GRANULOCYTES # BLD AUTO: 0 X10E3/UL (ref 0–0.1)
IMM GRANULOCYTES NFR BLD AUTO: 1 %
INTERPRETATION, 910389: NORMAL
LDLC SERPL CALC-MCNC: 66 MG/DL (ref 0–99)
LYMPHOCYTES # BLD AUTO: 1.1 X10E3/UL (ref 0.7–3.1)
LYMPHOCYTES NFR BLD AUTO: 28 %
MCH RBC QN AUTO: 32.6 PG (ref 26.6–33)
MCHC RBC AUTO-ENTMCNC: 33.6 G/DL (ref 31.5–35.7)
MCV RBC AUTO: 97 FL (ref 79–97)
MICROALBUMIN UR-MCNC: 46.4 UG/ML
MONOCYTES # BLD AUTO: 0.3 X10E3/UL (ref 0.1–0.9)
MONOCYTES NFR BLD AUTO: 9 %
NEUTROPHILS # BLD AUTO: 2.3 X10E3/UL (ref 1.4–7)
NEUTROPHILS NFR BLD AUTO: 58 %
PLATELET # BLD AUTO: 155 X10E3/UL (ref 150–450)
POTASSIUM SERPL-SCNC: 5.9 MMOL/L (ref 3.5–5.2)
PROT SERPL-MCNC: 6.9 G/DL (ref 6–8.5)
RBC # BLD AUTO: 2.67 X10E6/UL (ref 4.14–5.8)
SODIUM SERPL-SCNC: 142 MMOL/L (ref 134–144)
TRIGL SERPL-MCNC: 59 MG/DL (ref 0–149)
VLDLC SERPL CALC-MCNC: 12 MG/DL (ref 5–40)
WBC # BLD AUTO: 3.9 X10E3/UL (ref 3.4–10.8)

## 2019-09-17 ENCOUNTER — OFFICE VISIT (OUTPATIENT)
Dept: FAMILY MEDICINE CLINIC | Age: 79
End: 2019-09-17

## 2019-09-17 VITALS
RESPIRATION RATE: 18 BRPM | BODY MASS INDEX: 25.8 KG/M2 | HEART RATE: 56 BPM | HEIGHT: 63 IN | TEMPERATURE: 96.9 F | DIASTOLIC BLOOD PRESSURE: 47 MMHG | WEIGHT: 145.6 LBS | OXYGEN SATURATION: 98 % | SYSTOLIC BLOOD PRESSURE: 156 MMHG

## 2019-09-17 DIAGNOSIS — E11.9 DIABETES MELLITUS TYPE II, NON INSULIN DEPENDENT (HCC): Primary | ICD-10-CM

## 2019-09-17 DIAGNOSIS — R68.89 OTHER GENERAL SYMPTOMS AND SIGNS: ICD-10-CM

## 2019-09-17 DIAGNOSIS — D64.9 ANEMIA, UNSPECIFIED TYPE: ICD-10-CM

## 2019-09-17 DIAGNOSIS — I10 ESSENTIAL HYPERTENSION WITH GOAL BLOOD PRESSURE LESS THAN 130/80: ICD-10-CM

## 2019-09-17 DIAGNOSIS — R80.9 MICROALBUMINURIA: ICD-10-CM

## 2019-09-17 DIAGNOSIS — E87.5 HYPERKALEMIA: ICD-10-CM

## 2019-09-17 DIAGNOSIS — Z23 ENCOUNTER FOR IMMUNIZATION: ICD-10-CM

## 2019-09-17 LAB — HBA1C MFR BLD HPLC: 5.3 %

## 2019-09-17 RX ORDER — PIOGLITAZONEHYDROCHLORIDE 30 MG/1
30 TABLET ORAL DAILY
Qty: 90 TAB | Refills: 1 | Status: SHIPPED | OUTPATIENT
Start: 2019-09-17 | End: 2020-03-06

## 2019-09-17 RX ORDER — GLIMEPIRIDE 1 MG/1
TABLET ORAL
Qty: 90 TAB | Refills: 3 | Status: SHIPPED | OUTPATIENT
Start: 2019-09-17 | End: 2020-08-11 | Stop reason: SDUPTHER

## 2019-09-17 NOTE — PROGRESS NOTES
1. Have you been to the ER, urgent care clinic or hospitalized since your last visit? NO.     2. Have you seen or consulted any other health care providers outside of the [address] [place] [address] since your last visit (Include any pap smears or colon screening)? NO      Do you have an Advanced Directive? NO    Would you like information on Advanced Directives? NO  Felix Angelucci is a 78 y.o. male who presents for routine immunizations. He denies any symptoms , reactions or allergies that would exclude them from being immunized today. Risks and adverse reactions were discussed and the VIS was given to them. All questions were addressed. He was observed for 5 min post injection. There were no reactions observed.     Scar Nails LPN

## 2019-10-02 NOTE — PROGRESS NOTES
[facility]    CC: F/U for chronic disease management    HPI:     HTN:  -Got requested lab work  -Taking BP medication as prescribed  -Denies any side effects or issues with BP medication      Anemia:  -Got requested lab work  -Chronic issue  -Has been previously worked up with no identifiable cause  -Denies any issue with bleeding/blood loss      DMII:  -Got requested lab work  -Taking diabetic medication as prescribed  -Not following a regular exercise regimen  -Trying to follow diabetic diet      ROS: Positive items marked in RED  CON: fever, chills  Cardiovascular: palpitations, CP  Resp: SOB, cough  GI: nausea, vomiting, diarrhea  : dysuria, hematuria      Past Medical History:   Diagnosis Date    Cataract     [facility] mellitus, [facility] II [facility]     Iron deficiency anemia     Tuberculosis        Past Surgical History:   Procedure Laterality Date    HX BELOW KNEE AMPUTATION Right     HX CATARACT REMOVAL      HX ORTHOPAEDIC      HX PROSTATECTOMY      HX REFRACTIVE SURGERY  2009       History reviewed. No pertinent family history. Social History     Socioeconomic History    Marital status:      Spouse name: Not on file    Number of children: Not on file    Years of education: Not on file    Highest education level: Not on file   Tobacco Use    Smoking status: Never Smoker    Smokeless tobacco: Never Used   Substance and Sexual Activity    Alcohol use: No    Drug use: No    Sexual activity: Never       No Known Allergies      Current Outpatient Medications:     pioglitazone (ACTOS) 30 mg tablet, Take 1 Tab by mouth daily. , Disp: 90 Tab, Rfl: 1    glimepiride (AMARYL) 1 mg tablet, TAKE 1 TABLET BY MOUTH DAILY BEFORE BREAKFAST, Disp: 90 Tab, Rfl: 3    simvastatin (ZOCOR) 40 mg tablet, TAKE 1 TABLET BY MOUTH EVERY NIGHT, Disp: 90 Tab, Rfl: 0    aspirin delayed-release 81 mg tablet, TAKE 1 TABLET BY MOUTH ONCE DAILY, Disp: 90 Tab, Rfl: 0    hydroCHLOROthiazide (HYDRODIURIL) 25 mg tablet, TAKE 1 TABLET BY MOUTH DAILY, Disp: 90 Tab, Rfl: 0    metFORMIN (GLUCOPHAGE) 1,000 mg tablet, Take 1 Tab by mouth two (2) times daily (with meals). , Disp: 180 Tab, Rfl: 2    lisinopril (PRINIVIL, ZESTRIL) 40 mg tablet, TAKE 1 TABLET BY MOUTH DAILY, Disp: 90 Tab, Rfl: 3    cyanocobalamin (VITAMIN B-12) 1,000 mcg tablet, Take 1 Tab by mouth daily. , Disp: 90 Tab, Rfl: 0    fluticasone (FLONASE) 50 mcg/actuation nasal spray, 2 Sprays by Both Nostrils route daily. , Disp: 1 Bottle, Rfl: 8    Physical Exam:      /47 (BP 1 Location: Left arm, BP Patient Position: Sitting)   Pulse (!) 56   Temp 96.9 °F (36.1 °C) (Oral)   Resp 18   Ht 5' 3\" (1.6 m)   Wt 145 lb 9.6 oz (66 kg)   SpO2 98%   BMI 25.79 kg/m²     General:  WD, WN, NAD, conversant, sitting in wheelchair  Eyes: sclera clear bilaterally, no discharge noted, eyelids normal in appearance  HENT: NCAT  Lungs: CTAB, normal respiratory effort and rate  CV: RRR, 2/6 murmur, no rubs or gallops. ABD: soft, non-tender, non-distended, normal bowel sounds  Skin: normal temperature, turgor, color, and texture  Psych: alert and oriented to person, place and situation, normal affect  Neuro: speech normal, moving all extremities    Results for Sarwat Baca (MRN [id number]):   Ref. Range 9/17/2019 16:25   Hemoglobin A1c (POC) Latest Units: % 5.3      Ref.  Range 9/10/2019 08:58   WBC Latest Ref Range: 3.4 - 10.8 x10E3/uL 3.9   RBC Latest Ref Range: 4.14 - 5.80 x10E6/uL 2.67 (LL)   HGB Latest Ref Range: 13.0 - 17.7 g/dL 8.7 (L)   HCT Latest Ref Range: 37.5 - 51.0 % 25.9 (L)   MCV Latest Ref Range: 79 - 97 fL 97   MCH Latest Ref Range: 26.6 - 33.0 pg 32.6   MCHC Latest Ref Range: 31.5 - 35.7 g/dL 33.6   RDW Latest Ref Range: 12.3 - 15.4 % 12.7   PLATELET Latest Ref Range: 150 - 450 x10E3/uL 155   NEUTROPHILS Latest Ref Range: Not Estab. % 58   Lymphocytes Latest Ref Range: Not Estab. % 28   MONOCYTES Latest Ref Range: Not Estab. % 9 EOSINOPHILS Latest Ref Range: Not Estab. % 4   BASOPHILS Latest Ref Range: Not Estab. % 0   IMMATURE GRANULOCYTES Latest Ref Range: Not Estab. % 1   ABS. NEUTROPHILS Latest Ref Range: 1.4 - 7.0 x10E3/uL 2.3   ABS. IMM. GRANS. Latest Ref Range: 0.0 - 0.1 x10E3/uL 0.0   Abs Lymphocytes Latest Ref Range: 0.7 - 3.1 x10E3/uL 1.1   ABS. MONOCYTES Latest Ref Range: 0.1 - 0.9 x10E3/uL 0.3   ABS. EOSINOPHILS Latest Ref Range: 0.0 - 0.4 x10E3/uL 0.1   ABS. BASOPHILS Latest Ref Range: 0.0 - 0.2 x10E3/uL 0.0   Sodium Latest Ref Range: 134 - 144 mmol/L 142   Potassium Latest Ref Range: 3.5 - 5.2 mmol/L 5.9 (H)   Chloride Latest Ref Range: 96 - 106 mmol/L 109 (H)   CO2 Latest Ref Range: 20 - 29 mmol/L 23   Glucose Latest Ref Range: 65 - 99 mg/dL 90   BUN Latest Ref Range: 8 - 27 mg/dL 32 (H)   Creatinine Latest Ref Range: 0.76 - 1.27 mg/dL 1.14   BUN/Creatinine ratio Latest Ref Range: 10 - 24  28 (H)   Calcium Latest Ref Range: 8.6 - 10.2 mg/dL 9.5   GFR est non-AA Latest Ref Range: >59 mL/min/1.73 61   GFR est AA Latest Ref Range: >59 mL/min/1.73 70   Bilirubin, total Latest Ref Range: 0.0 - 1.2 mg/dL <0.2   Protein, total Latest Ref Range: 6.0 - 8.5 g/dL 6.9   Albumin Latest Ref Range: 3.5 - 4.8 g/dL 4.2   A-G Ratio Latest Ref Range: 1.2 - 2.2  1.6   ALT (SGPT) Latest Ref Range: 0 - 44 IU/L 8   AST Latest Ref Range: 0 - 40 IU/L 15   Alk. phosphatase Latest Ref Range: 39 - 117 IU/L 58   Triglyceride Latest Ref Range: 0 - 149 mg/dL 59   Cholesterol, total Latest Ref Range: 100 - 199 mg/dL 151   HDL Cholesterol Latest Ref Range: >39 mg/dL 73   LDL, calculated Latest Ref Range: 0 - 99 mg/dL 66   VLDL, calculated Latest Ref Range: 5 - 40 mg/dL 12   Creatinine, urine Latest Ref Range: Not Estab. mg/dL 48.8   Microalbumin, urine Latest Ref Range: Not Estab. ug/mL 46.4   Microalbumin/Creat.  Ratio Latest Ref Range: 0.0 - 30.0 mg/g creat 95.1 (H)       Assessment/Plan     DMII, Well Controlled:  -HGBA1c at goal of <7%  -Will decrease Actos dose to 30 mg, as diabetes appears to be too well controlled (Using caution due to patient's age and high risk for hypoglycemia)  -F/U in one month      HTN:  -Not at goal BP of <130/80  -Will defer to recommendations of cardiologist, given patient low diastolic BP  -F/U in one month      Hyperkalemia:  -Etiology unclear, possibly related to lisinopril use  -DDx diagnosis includes hemolysis given anemia  -Will recheck potassium level with BMP  -Will continue current lisinopril regimen given microalbuminuria  -F/U in one month      Anemia:  -Unknown etiology  -DDx diagnosis includes hemolysis given hyperkalemia  -Ferritin, iron profile, B12, folate, and FIT ordered  -F/U in one month      Microalbinuria, Improving:  -Will continue current lisinopril regimen  -F/U in one month      Health Maintenance:  -Flu shot given        Tuan Hall MD  10/2/2019, 3:42 AM

## 2019-10-09 DIAGNOSIS — I10 ESSENTIAL HYPERTENSION: ICD-10-CM

## 2019-10-15 RX ORDER — HYDROCHLOROTHIAZIDE 25 MG/1
TABLET ORAL
Qty: 90 TAB | Refills: 0 | Status: SHIPPED | OUTPATIENT
Start: 2019-10-15 | End: 2020-01-08

## 2019-10-16 LAB
BUN SERPL-MCNC: 34 MG/DL (ref 8–27)
BUN/CREAT SERPL: 30 (ref 10–24)
CALCIUM SERPL-MCNC: 9.6 MG/DL (ref 8.6–10.2)
CHLORIDE SERPL-SCNC: 108 MMOL/L (ref 96–106)
CO2 SERPL-SCNC: 21 MMOL/L (ref 20–29)
CREAT SERPL-MCNC: 1.12 MG/DL (ref 0.76–1.27)
FERRITIN SERPL-MCNC: 14 NG/ML (ref 30–400)
FOLATE SERPL-MCNC: 18.9 NG/ML
GLUCOSE SERPL-MCNC: 84 MG/DL (ref 65–99)
IRON SATN MFR SERPL: 19 % (ref 15–55)
IRON SERPL-MCNC: 64 UG/DL (ref 38–169)
POTASSIUM SERPL-SCNC: 5.9 MMOL/L (ref 3.5–5.2)
SODIUM SERPL-SCNC: 144 MMOL/L (ref 134–144)
TIBC SERPL-MCNC: 329 UG/DL (ref 250–450)
UIBC SERPL-MCNC: 265 UG/DL (ref 111–343)
VIT B12 SERPL-MCNC: 706 PG/ML (ref 232–1245)

## 2019-11-27 ENCOUNTER — HOSPITAL ENCOUNTER (OUTPATIENT)
Dept: LAB | Age: 79
Discharge: HOME | End: 2019-11-27
Payer: MEDICARE

## 2019-11-27 ENCOUNTER — OFFICE VISIT (OUTPATIENT)
Dept: FAMILY MEDICINE CLINIC | Age: 79
End: 2019-11-27

## 2019-11-27 VITALS
WEIGHT: 148 LBS | TEMPERATURE: 96 F | SYSTOLIC BLOOD PRESSURE: 159 MMHG | HEIGHT: 63 IN | BODY MASS INDEX: 26.22 KG/M2 | DIASTOLIC BLOOD PRESSURE: 50 MMHG | HEART RATE: 48 BPM | OXYGEN SATURATION: 99 % | RESPIRATION RATE: 14 BRPM

## 2019-11-27 DIAGNOSIS — D50.9 IRON DEFICIENCY ANEMIA, UNSPECIFIED IRON DEFICIENCY ANEMIA TYPE: ICD-10-CM

## 2019-11-27 DIAGNOSIS — E87.5 HYPERKALEMIA: ICD-10-CM

## 2019-11-27 DIAGNOSIS — Z00.00 MEDICARE ANNUAL WELLNESS VISIT, SUBSEQUENT: Primary | ICD-10-CM

## 2019-11-27 LAB
ANION GAP SERPL CALC-SCNC: 3 MMOL/L (ref 3–18)
BUN SERPL-MCNC: 29 MG/DL (ref 7–18)
BUN/CREAT SERPL: 25 (ref 12–20)
CALCIUM SERPL-MCNC: 9.3 MG/DL (ref 8.5–10.1)
CHLORIDE SERPL-SCNC: 112 MMOL/L (ref 100–111)
CO2 SERPL-SCNC: 26 MMOL/L (ref 21–32)
CREAT SERPL-MCNC: 1.14 MG/DL (ref 0.6–1.3)
GLUCOSE SERPL-MCNC: 66 MG/DL (ref 74–99)
POTASSIUM SERPL-SCNC: 5.4 MMOL/L (ref 3.5–5.5)
SODIUM SERPL-SCNC: 141 MMOL/L (ref 136–145)

## 2019-11-27 PROCEDURE — 36415 COLL VENOUS BLD VENIPUNCTURE: CPT

## 2019-11-27 PROCEDURE — 80048 BASIC METABOLIC PNL TOTAL CA: CPT

## 2019-11-27 NOTE — PATIENT INSTRUCTIONS
Medicare Wellness Visit, Male The best way to live healthy is to have a lifestyle where you eat a well-balanced diet, exercise regularly, limit alcohol use, and quit all forms of tobacco/nicotine, if applicable. Regular preventive services are another way to keep healthy. Preventive services (vaccines, screening tests, monitoring & exams) can help personalize your care plan, which helps you manage your own care. Screening tests can find health problems at the earliest stages, when they are easiest to treat. [facility] follows the current, evidence-based guidelines published by the [facility] Adi Theodore ([facility]) when recommending preventive services for our patients. Because we follow these guidelines, sometimes recommendations change over time as research supports it. (For example, a prostate screening blood test is no longer routinely recommended for men with no symptoms). Of course, you and your doctor may decide to screen more often for some diseases, based on your risk and co-morbidities (chronic disease you are already diagnosed with). Preventive services for you include: - Medicare offers their members a free annual wellness visit, which is time for you and your primary care provider to discuss and plan for your preventive service needs. Take advantage of this benefit every year! 
-All adults over age 72 should receive the recommended pneumonia vaccines. Current USPSTF guidelines recommend a series of two vaccines for the best pneumonia protection.  
-All adults should have a flu vaccine yearly and tetanus vaccine every 10 years. 
-All adults age 48 and older should receive the shingles vaccines (series of two vaccines).       
-All adults age 38-68 who are overweight should have a diabetes screening test once every three years.  
-Other screening tests & preventive services for persons with diabetes include: an eye exam to screen for diabetic retinopathy, a kidney function test, a foot exam, and stricter control over your cholesterol.  
-Cardiovascular screening for adults with routine risk involves an electrocardiogram (ECG) at intervals determined by the provider.  
-Colorectal cancer screening should be done for adults age 54-65 with no increased risk factors for colorectal cancer. There are a number of acceptable methods of screening for this type of cancer. Each test has its own benefits and drawbacks. Discuss with your provider what is most appropriate for you during your annual wellness visit. The different tests include: colonoscopy (considered the best screening method), a fecal occult blood test, a fecal DNA test, and sigmoidoscopy. 
-All adults born between [facility] should be screened once for Hepatitis C. 
-An Abdominal Aortic Aneurysm (AAA) Screening is recommended for men age 73-68 who has ever smoked in their lifetime. Here is a list of your current Health Maintenance items (your personalized list of preventive services) with a due date: 
Health Maintenance Due Topic Date Due [facility] Eye Exam  06/25/1950  Glaucoma Screening   06/25/2005 [facility] Annual Well Visit  10/14/2018 Instrucciones médicas por anticipado: Instrucciones de cuidado - [ Advance Directives: Care Instructions ] Instrucciones de cuidado Las instrucciones médicas por anticipado son Lizbet Offer legal de afirmar acacia deseos al final de mcnair liv. Informa a mcnair akilah y mcnair médico acerca de lo que deben hacer si usted ya no puede expresar lo que desea. Streeter Jessica tipos principales de instrucciones médicas por anticipado. Usted puede modificarlas en cualquier momento que acacia deseos cambien. · Un testamento vital le comunica a mcnair akilah y a mcnair médico acacia deseos acerca del mantenimiento artificial de la liv y otros tratamientos.  
· Un poder notarial permanente para asuntos médicos le permite designar a katie persona para que tome decisiones de tratamiento en mcnair nombre cuando usted no pueda expresarse por sí mismo. Esta persona se conoce ignacio representante de [employer]. Si no tiene instrucciones médicas por anticipado, las decisiones relacionadas con acacia cuidados médicos podrían ser tomadas por un médico o juez que no lo conoce. Puede ser útil pensar en las instrucciones médicas por anticipado ignacio un regalo a las personas que lo [place]. Si tiene instrucciones médicas por anticipado, acacia seres queridos no tendrán que vern decisiones difíciles por sí solos. La atención de seguimiento es katie parte clave de mcnair tratamiento y seguridad. Asegúrese de hacer y acudir a todas las citas, y llame a mcnair médico si está teniendo problemas. También es katie buena idea saber los resultados de acacia exámenes y mantener katie lista de los medicamentos que meghan. Cómo puede cuidarse en el hogar? · Hable con acacia seres queridos y mcnair médico acerca de acacia deseos. [employer] modo, no habrá sorpresas. · Muchos estados tienen un formulario específico. O podría usar un formulario universal que haya sido aprobado por muchos estados. Marlin tipo de formulario a veces puede completarse y almacenarse en línea. Jason Daunt, mcnair copia electrónica estará disponible siempre que tenga katie conexión a Internet. En la [employer], los médicos respetarán acacia deseos incluso si tiene un formulario de un estado diferente. · No necesita un abogado para preparar instrucciones médicas por anticipado. No obstante, chandler vez desee contar con asesoría legal. 
· Piense en estas preguntas cuando prepare las instrucciones médicas por anticipado: 
? 
Karly Lips que tome las [employer] mcnair atención médica si usted no puede hacerlo? Muchas Fayrene Sellar a un familiar o a un amigo íntimo. ? Conoce lo suficiente acerca de los métodos de soporte vital que podrían utilizarse? Si no, hable con mcnair médico para que se lo explique. ? 
Qué es lo que más teme que podría pasar? Podría tener miedo al Marlee Minaya, a perder la independencia o mantener la liv por medios artificiales (de máquinas). ? 
Dónde preferiría morir? Puede ser en mcnair casa, en un hospital o en un hogar para ancianos. ? 
Le gustaría obtener información acerca de la atención en centros de cuidados paliativos para brindarle apoyo a usted y a mcnair akilah? ? 
Le gustaría donar acacia órganos cuando muera? ? 
Le gustaría hacer alguna ceremonia religiosa antes de morir? En gus afirmativo, incluya acacia deseos en las instrucciones médicas por anticipado. · Tequila acacia instrucciones por anticipado todos los años y caitlyn las modificaciones que crea necesarias. Cuándo debe pedir ayuda? Asegúrese de comunicarse con mcnair médico si tiene preguntas. Dónde puede encontrar más información en inglés? Dinesh Salon a [email or web address]-janes.[email or web address] Marge Pall V307 en la búsqueda para aprender más acerca de \"Instrucciones médicas por anticipado: Instrucciones de cuidado - [ Advance Directives: Care Instructions ]. \" 
Revisado: 1 abril, 2019 Versión del contenido: 12.2 © 7433-0687 Healthwise, Incorporated. Las instrucciones de cuidado fueron adaptadas bajo licencia por [employer] (which disclaims liability or warranty for this information). Si usted tiene Benzie Whitewood afección médica o sobre estas instrucciones, siempre pregunte a mcnair profesional de joe. Healthwise, Incorporated niega toda garantía o responsabilidad por mcnair uso de esta información.

## 2019-11-27 NOTE — PROGRESS NOTES
[facility]    CC: Follow-up of Anemia    HPI:     Anemia:  -Requested blood work  -Currently reports no associated symptoms  -Denies any issues with bleed  -Taking over-the-counter iron supplement      Hyperkalemia:  -On no potassium supplement  -Currently reports no associated symptoms      ROS: Positive items marked in RED  CON: fever, chills  Cardiovascular: palpitations, CP  Resp: SOB, cough  GI: nausea, vomiting, diarrhea  : dysuria, hematuria      Past Medical History:   Diagnosis Date    Cataract     Diabetes mellitus, type II ([address]     Iron deficiency anemia     Tuberculosis        Past Surgical History:   Procedure Laterality Date    HX BELOW KNEE AMPUTATION Right     HX CATARACT REMOVAL      HX ORTHOPAEDIC      HX PROSTATECTOMY      HX REFRACTIVE SURGERY  2009       Family History   Problem Relation Age of Onset    No Known Problems Mother     Alcohol abuse Father        Social History     Socioeconomic History    Marital status:      Spouse name: Not on file    Number of children: Not on file    Years of education: Not on file    Highest education level: Not on file   Tobacco Use    Smoking status: Never Smoker    Smokeless tobacco: Never Used   Substance and Sexual Activity    Alcohol use: No    Drug use: No    Sexual activity: Never       No Known Allergies      Current Outpatient Medications:     aspirin delayed-release 81 mg tablet, TAKE 1 TABLET BY MOUTH ONCE DAILY, Disp: 90 Tab, Rfl: 0    hydroCHLOROthiazide (HYDRODIURIL) 25 mg tablet, TAKE 1 TABLET BY MOUTH DAILY, Disp: 90 Tab, Rfl: 0    simvastatin (ZOCOR) 40 mg tablet, TAKE 1 TABLET BY MOUTH EVERY NIGHT, Disp: 90 Tab, Rfl: 3    pioglitazone (ACTOS) 30 mg tablet, Take 1 Tab by mouth daily. , Disp: 90 Tab, Rfl: 1    metFORMIN (GLUCOPHAGE) 1,000 mg tablet, Take 1 Tab by mouth two (2) times daily (with meals). , Disp: 180 Tab, Rfl: 2    lisinopril (PRINIVIL, ZESTRIL) 40 mg tablet, TAKE 1 TABLET BY MOUTH DAILY, Disp: 90 Tab, Rfl: 3    cyanocobalamin (VITAMIN B-12) 1,000 mcg tablet, Take 1 Tab by mouth daily. , Disp: 90 Tab, Rfl: 0    glimepiride (AMARYL) 1 mg tablet, TAKE 1 TABLET BY MOUTH DAILY BEFORE BREAKFAST, Disp: 90 Tab, Rfl: 3    fluticasone (FLONASE) 50 mcg/actuation nasal spray, 2 Sprays by Both Nostrils route daily. , Disp: 1 Bottle, Rfl: 8    Physical Exam:      /50   Pulse (!) 48   Temp 96 °F (35.6 °C) (Oral)   Resp 14   Ht 5' 3\" (1.6 m)   Wt 148 lb (67.1 kg)   SpO2 99%   BMI 26.22 kg/m²     General:  WD, WN, NAD, conversant  Eyes: sclera clear bilaterally, no discharge noted, eyelids normal in appearance  HENT: NCAT  Lungs: CTAB, normal respiratory effort and rate  CV: RRR, no MRGs  ABD: soft, non-tender, non-distended, normal bowel sounds  Skin: normal temperature, turgor, color, and texture  Psych: alert and oriented to person, place and situation, normal affect  Neuro: speech normal, moving all extremities    Results for Gloria Mccrary (MRN [id number]):   Ref.  Range 10/15/2019 00:00   Sodium Latest Ref Range: 134 - 144 mmol/L 144   Potassium Latest Ref Range: 3.5 - 5.2 mmol/L 5.9 (H)   Chloride Latest Ref Range: 96 - 106 mmol/L 108 (H)   CO2 Latest Ref Range: 20 - 29 mmol/L 21   Glucose Latest Ref Range: 65 - 99 mg/dL 84   BUN Latest Ref Range: 8 - 27 mg/dL 34 (H)   Creatinine Latest Ref Range: 0.76 - 1.27 mg/dL 1.12   BUN/Creatinine ratio Latest Ref Range: 10 - 24  30 (H)   Calcium Latest Ref Range: 8.6 - 10.2 mg/dL 9.6   GFR est non-AA Latest Ref Range: >59 mL/min/1.73 62   GFR est AA Latest Ref Range: >59 mL/min/1.73 72   Vitamin B12 Latest Ref Range: 232 - 1,245 pg/mL 706   Folate Latest Ref Range: >3.0 ng/mL 18.9   Iron Latest Ref Range: 38 - 169 ug/dL 64   TIBC Latest Ref Range: 250 - 450 ug/dL 329   Iron % saturation Latest Ref Range: 15 - 55 % 19   UIBC Latest Ref Range: 111 - 343 ug/dL 265   Ferritin Latest Ref Range: 30 - 400 ng/mL 14 (L)       Assessment/Plan Hyperkalemia:  -Suspect it may be secondary to lisinopril use  -Counseled on diagnosis  -BMP ordered  -Will continue current HCTZ regimen, as it will lower potassium level  -Follow-up in 6 weeks      Iron Deficiency Anemia:  -Low ferritin is suggestive of iron deficiency  -Counseled on diagnosis  -Will start on iron supplement  -Follow-up in 6 weeks        Nelson Dawn MD  11/27/2019, 3:43 PM      This is the Subsequent Medicare Annual Wellness Exam, performed 12 months or more after the Initial AWV or the last Subsequent AWV    I have reviewed the patient's medical history in detail and updated the computerized patient record. History     Patient Active Problem List   Diagnosis Code    Diabetes mellitus type II, non insulin dependent ([facility] [address] E11.9    Essential hypertension with goal blood pressure less than 130/80 I10    Microalbuminuria R80.9    Type 2 diabetes with nephropathy ([facility] [address] E11.21    Dyslipidemia E78.5    Hyperlipidemia E78.5     Past Medical History:   Diagnosis Date    Cataract     Diabetes mellitus, type II ([facility] [address]     Iron deficiency anemia     Tuberculosis       Past Surgical History:   Procedure Laterality Date    HX BELOW KNEE AMPUTATION Right     HX CATARACT REMOVAL      HX ORTHOPAEDIC      HX PROSTATECTOMY      HX REFRACTIVE SURGERY  2009     Current Outpatient Medications   Medication Sig Dispense Refill    aspirin delayed-release 81 mg tablet TAKE 1 TABLET BY MOUTH ONCE DAILY 90 Tab 0    hydroCHLOROthiazide (HYDRODIURIL) 25 mg tablet TAKE 1 TABLET BY MOUTH DAILY 90 Tab 0    simvastatin (ZOCOR) 40 mg tablet TAKE 1 TABLET BY MOUTH EVERY NIGHT 90 Tab 3    pioglitazone (ACTOS) 30 mg tablet Take 1 Tab by mouth daily. 90 Tab 1    metFORMIN (GLUCOPHAGE) 1,000 mg tablet Take 1 Tab by mouth two (2) times daily (with meals).  180 Tab 2    lisinopril (PRINIVIL, ZESTRIL) 40 mg tablet TAKE 1 TABLET BY MOUTH DAILY 90 Tab 3    cyanocobalamin (VITAMIN B-12) 1,000 mcg tablet Take 1 Tab by mouth daily. 90 Tab 0    glimepiride (AMARYL) 1 mg tablet TAKE 1 TABLET BY MOUTH DAILY BEFORE BREAKFAST 90 Tab 3    fluticasone (FLONASE) 50 mcg/actuation nasal spray 2 Sprays by Both Nostrils route daily. 1 Bottle 8     No Known Allergies    No family history on file. Social History     Tobacco Use    Smoking status: Never Smoker    Smokeless tobacco: Never Used   Substance Use Topics    Alcohol use: No       Depression Risk Factor Screening:     3 most recent PHQ Screens 11/27/2019   Little interest or pleasure in doing things Not at all   Feeling down, depressed, irritable, or hopeless Not at all   Total Score PHQ 2 0       Alcohol Risk Factor Screening (MALE > 65): Do you average more 1 drink per night or more than 7 drinks a week: No    In the past three months have you have had more than 4 drinks containing alcohol on one occasion: No      Functional Ability and Level of Safety:   Hearing: Whisper Test done with normal results. Activities of Daily Living: The home contains: grab bars  Patient does total self care    Ambulation: with no difficulty    Fall Risk:  Fall Risk Assessment, last 12 mths 11/27/2019   Able to walk? Yes   Fall in past 12 months? No   Fall with injury? -   Number of falls in past 12 months -   Fall Risk Score -       Abuse Screen:  Patient is not abused    Cognitive Screening   Has your family/caregiver stated any concerns about your memory: no  Cognitive Screening: Normal    Patient Care Team   Patient Care Team:  Lul Walter MD as PCP - General (Family Practice)  Lul Walter MD as PCP - [facility]    Assessment/Plan   Education and counseling provided:  Are appropriate based on today's review and evaluation  End-of-Life planning (with patient's consent)       Diagnoses and all orders for this visit:    1.  Medicare annual wellness visit, subsequent  -     FULL CODE      Reports having already had eye exam this year  Health Maintenance Due Topic Date Due    EYE EXAM RETINAL OR DILATED  06/25/1950    GLAUCOMA SCREENING Q2Y  06/25/2005    MEDICARE YEARLY EXAM  10/14/2018

## 2019-11-27 NOTE — PROGRESS NOTES
1. Have you been to the ER, urgent care clinic since your last visit? Hospitalized since your last visit? No    2. Have you seen or consulted any other health care providers outside of the [address] [place] [address] since your last visit? Include any pap smears or colon screening.  No

## 2019-11-29 RX ORDER — FERROUS FUMARATE 324(106)MG
1 TABLET ORAL 2 TIMES DAILY
Qty: 180 TAB | Refills: 0 | Status: SHIPPED | OUTPATIENT
Start: 2019-11-29 | End: 2020-03-10

## 2019-11-29 NOTE — ACP (ADVANCE CARE PLANNING)
Advance Care Planning (ACP) Provider Note - Comprehensive     Date of ACP Conversation: 11/27/19  Persons included in Conversation:  patient  Length of ACP Conversation in minutes:  <16 minutes (Non-Billable)    Authorized Decision Maker (if patient is incapable of making informed decisions):    This person is:    Marcin Lozoya          General ACP for ALL Patients with Decision Making Capacity:   Importance of advance care planning, including choosing a healthcare agent to communicate patient's healthcare decisions if patient lost the ability to make decisions, such as after a sudden illness or accident  Exploration of values, goals, and preferences if recovery is not expected, even with continued medical treatment in the event of: Imminent death  Severe, permanent brain injury    Review of Existing Advance Directive:  N/A    For Serious or Chronic Illness:  No known illness    Interventions Provided:  Recommended completion of Advance Directive form after review of ACP materials and conversation with prospective healthcare agent

## 2020-01-03 DIAGNOSIS — I10 ESSENTIAL HYPERTENSION: ICD-10-CM

## 2020-01-09 RX ORDER — HYDROCHLOROTHIAZIDE 25 MG/1
TABLET ORAL
Qty: 90 TAB | Refills: 2 | Status: SHIPPED | OUTPATIENT
Start: 2020-01-09 | End: 2020-01-23 | Stop reason: DRUGHIGH

## 2020-01-23 ENCOUNTER — OFFICE VISIT (OUTPATIENT)
Dept: FAMILY MEDICINE CLINIC | Age: 80
End: 2020-01-23

## 2020-01-23 VITALS
HEIGHT: 63 IN | WEIGHT: 149 LBS | OXYGEN SATURATION: 96 % | RESPIRATION RATE: 14 BRPM | DIASTOLIC BLOOD PRESSURE: 43 MMHG | TEMPERATURE: 95.5 F | BODY MASS INDEX: 26.4 KG/M2 | SYSTOLIC BLOOD PRESSURE: 167 MMHG | HEART RATE: 65 BPM

## 2020-01-23 DIAGNOSIS — I10 ESSENTIAL HYPERTENSION WITH GOAL BLOOD PRESSURE LESS THAN 130/80: ICD-10-CM

## 2020-01-23 DIAGNOSIS — E87.5 HYPERKALEMIA: Primary | ICD-10-CM

## 2020-01-23 DIAGNOSIS — E11.9 DIABETES MELLITUS TYPE II, NON INSULIN DEPENDENT (HCC): ICD-10-CM

## 2020-01-23 DIAGNOSIS — D50.9 IRON DEFICIENCY ANEMIA, UNSPECIFIED IRON DEFICIENCY ANEMIA TYPE: ICD-10-CM

## 2020-01-23 RX ORDER — HYDROCHLOROTHIAZIDE 50 MG/1
50 TABLET ORAL DAILY
Qty: 90 TAB | Refills: 5 | Status: SHIPPED | OUTPATIENT
Start: 2020-01-23 | End: 2020-09-01 | Stop reason: SDUPTHER

## 2020-01-23 NOTE — PROGRESS NOTES
1. Have you been to the ER, urgent care clinic since your last visit? Hospitalized since your last visit? No    2. Have you seen or consulted any other health care providers outside of the [address] [place] [address] since your last visit? Include any pap smears or colon screening.  No

## 2020-02-06 NOTE — PROGRESS NOTES
[facility]    CC: F/U for chronic disease management    HPI:     Hyperkalemia:  -Got requested lab work  -Taking HCTZ as prescribed  -Denies any side effects or issue with the medication       Iron Deficiency Anemia:  -Taking iron supplement as prescribed  -Denies any side effects or issues with the supplement   -Denies any changes in diet since last visit      HTN:  -Taking BP medication as prescribed  -Denies any side effects or issues the medication   -Not following a regular exercise regimen  -Denies any changes in diet since last visit      ROS: Positive items marked in RED  CON: fever, chills  Cardiovascular: palpitations, CP  Resp: SOB, cough  GI: nausea, vomiting, diarrhea  : dysuria, hematuria      Past Medical History:   Diagnosis Date    Cataract     Diabetes mellitus, type II ([address]     Iron deficiency anemia     Tuberculosis        Past Surgical History:   Procedure Laterality Date    HX BELOW KNEE AMPUTATION Right     HX CATARACT REMOVAL      HX ORTHOPAEDIC      HX PROSTATECTOMY      HX REFRACTIVE SURGERY  2009       Family History   Problem Relation Age of Onset    No Known Problems Mother     Alcohol abuse Father        Social History     Socioeconomic History    Marital status:      Spouse name: Not on file    Number of children: Not on file    Years of education: Not on file    Highest education level: Not on file   Tobacco Use    Smoking status: Never Smoker    Smokeless tobacco: Never Used   Substance and Sexual Activity    Alcohol use: No    Drug use: No    Sexual activity: Never       No Known Allergies      Current Outpatient Medications:     hydroCHLOROthiazide (HYDRODIURIL) 50 mg tablet, Take 1 Tab by mouth daily. , Disp: 90 Tab, Rfl: 5    aspirin delayed-release 81 mg tablet, TAKE 1 TABLET BY MOUTH ONCE DAILY, Disp: 90 Tab, Rfl: 5    ferrous fumarate 324 mg (106 mg iron) tab, Take 1 Tab by mouth two (2) times a day., Disp: 180 Tab, Rfl: 0   simvastatin (ZOCOR) 40 mg tablet, TAKE 1 TABLET BY MOUTH EVERY NIGHT, Disp: 90 Tab, Rfl: 3    pioglitazone (ACTOS) 30 mg tablet, Take 1 Tab by mouth daily. , Disp: 90 Tab, Rfl: 1    glimepiride (AMARYL) 1 mg tablet, TAKE 1 TABLET BY MOUTH DAILY BEFORE BREAKFAST, Disp: 90 Tab, Rfl: 3    metFORMIN (GLUCOPHAGE) 1,000 mg tablet, Take 1 Tab by mouth two (2) times daily (with meals). , Disp: 180 Tab, Rfl: 2    lisinopril (PRINIVIL, ZESTRIL) 40 mg tablet, TAKE 1 TABLET BY MOUTH DAILY, Disp: 90 Tab, Rfl: 3    cyanocobalamin (VITAMIN B-12) 1,000 mcg tablet, Take 1 Tab by mouth daily. , Disp: 90 Tab, Rfl: 0    fluticasone (FLONASE) 50 mcg/actuation nasal spray, 2 Sprays by Both Nostrils route daily. , Disp: 1 Bottle, Rfl: 8    Physical Exam:      /43   Pulse 65   Temp 95.5 °F (35.3 °C) (Oral)   Resp 14   Ht 5' 3\" (1.6 m)   Wt 149 lb (67.6 kg)   SpO2 96%   BMI 26.39 kg/m²     General:  WD, WN, NAD, conversant  Eyes: sclera clear bilaterally, no discharge noted, eyelids normal in appearance  HENT: NCAT  Lungs: CTAB, normal respiratory effort and rate  CV: RRR, no MRGs  ABD: soft, non-tender, non-distended, normal bowel sounds  Skin: normal temperature, turgor, color, and texture  Psych: alert and oriented to person, place and situation, normal affect  Neuro: speech normal, moving all extremities    Results for Weogufka Slight (MRN [id number]):   Ref.  Range 11/27/2019 15:40   Sodium Latest Ref Range: 136 - 145 mmol/L 141   Potassium Latest Ref Range: 3.5 - 5.5 mmol/L 5.4   Chloride Latest Ref Range: 100 - 111 mmol/L 112 (H)   CO2 Latest Ref Range: 21 - 32 mmol/L 26   Anion gap Latest Ref Range: 3.0 - 18 mmol/L 3   Glucose Latest Ref Range: 74 - 99 mg/dL 66 (L)   BUN Latest Ref Range: 7.0 - 18 MG/DL 29 (H)   Creatinine Latest Ref Range: 0.6 - 1.3 MG/DL 1.14   BUN/Creatinine ratio Latest Ref Range: 12 - 20   25 (H)   Calcium Latest Ref Range: 8.5 - 10.1 MG/DL 9.3   GFR est non-AA Latest Ref Range: >60 ml/min/1.73m2 >60 GFR est AA Latest Ref Range: >60 ml/min/1.73m2 >60       Assessment/Plan     Hyperkalemia, improving:  -Suspect it may be secondary to lisinopril use  -Will increase HCTZ dose to 50 mg given potassium is in high end of normal  -Plan to reduce lisinopril dose, given low diastolic blood pressure and issues with elevated potassium  -CMP ordered  -Follow-up in 2 months        Iron Deficiency Anemia:  -Will continue current iron supplement regimen  -CBC ordered  -Follow-up in 2 months      HTN:  -Not at goal BP of <130/80  -Will increase HCTZ dose to 50 mg given potassium is in high end of normal  -Plan to reduce lisinopril dose, given low diastolic blood pressure and issues with elevated potassium  -HGBA1c, CBC, and CMP ordered  -Follow-up in 2 months      DMII:  -Will continue current diabetic medication regimen  -HGBA1c, CBC, and CMP ordered  -Follow-up in 2 months        Kyle Mejia MD  1/23/2020

## 2020-02-16 DIAGNOSIS — E11.9 DIABETES MELLITUS TYPE II, NON INSULIN DEPENDENT (HCC): ICD-10-CM

## 2020-02-17 RX ORDER — METFORMIN HYDROCHLORIDE 1000 MG/1
TABLET ORAL
Qty: 180 TAB | Refills: 2 | Status: SHIPPED | OUTPATIENT
Start: 2020-02-17 | End: 2020-09-01

## 2020-02-25 DIAGNOSIS — I10 ESSENTIAL HYPERTENSION: ICD-10-CM

## 2020-02-26 ENCOUNTER — HOSPITAL ENCOUNTER (OUTPATIENT)
Dept: LAB | Age: 80
Discharge: HOME | End: 2020-02-26
Payer: MEDICARE

## 2020-02-26 DIAGNOSIS — D50.9 IRON DEFICIENCY ANEMIA, UNSPECIFIED IRON DEFICIENCY ANEMIA TYPE: ICD-10-CM

## 2020-02-26 DIAGNOSIS — I10 ESSENTIAL HYPERTENSION WITH GOAL BLOOD PRESSURE LESS THAN 130/80: ICD-10-CM

## 2020-02-26 DIAGNOSIS — E11.9 DIABETES MELLITUS TYPE II, NON INSULIN DEPENDENT (HCC): ICD-10-CM

## 2020-02-26 LAB
ALBUMIN SERPL-MCNC: 3.9 G/DL (ref 3.4–5)
ALBUMIN/GLOB SERPL: 1.1 {RATIO} (ref 0.8–1.7)
ALP SERPL-CCNC: 68 U/L (ref 45–117)
ALT SERPL-CCNC: 17 U/L (ref 16–61)
ANION GAP SERPL CALC-SCNC: 6 MMOL/L (ref 3–18)
AST SERPL-CCNC: 14 U/L (ref 10–38)
BILIRUB SERPL-MCNC: 0.2 MG/DL (ref 0.2–1)
BUN SERPL-MCNC: 34 MG/DL (ref 7–18)
BUN/CREAT SERPL: 31 (ref 12–20)
CALCIUM SERPL-MCNC: 9.3 MG/DL (ref 8.5–10.1)
CHLORIDE SERPL-SCNC: 111 MMOL/L (ref 100–111)
CO2 SERPL-SCNC: 27 MMOL/L (ref 21–32)
CREAT SERPL-MCNC: 1.08 MG/DL (ref 0.6–1.3)
ERYTHROCYTE [DISTWIDTH] IN BLOOD BY AUTOMATED COUNT: 13.6 % (ref 11.6–14.5)
EST. AVERAGE GLUCOSE BLD GHB EST-MCNC: 117 MG/DL
GLOBULIN SER CALC-MCNC: 3.4 G/DL (ref 2–4)
GLUCOSE SERPL-MCNC: 59 MG/DL (ref 74–99)
HBA1C MFR BLD: 5.7 % (ref 4.2–5.6)
HCT VFR BLD AUTO: 31.6 % (ref 36–48)
HGB BLD-MCNC: 10.1 G/DL (ref 13–16)
MCH RBC QN AUTO: 31.6 PG (ref 24–34)
MCHC RBC AUTO-ENTMCNC: 32 G/DL (ref 31–37)
MCV RBC AUTO: 98.8 FL (ref 74–97)
PLATELET # BLD AUTO: 205 K/UL (ref 135–420)
PMV BLD AUTO: 10.6 FL (ref 9.2–11.8)
POTASSIUM SERPL-SCNC: 5.1 MMOL/L (ref 3.5–5.5)
PROT SERPL-MCNC: 7.3 G/DL (ref 6.4–8.2)
RBC # BLD AUTO: 3.2 M/UL (ref 4.7–5.5)
SODIUM SERPL-SCNC: 144 MMOL/L (ref 136–145)
WBC # BLD AUTO: 4.7 K/UL (ref 4.6–13.2)

## 2020-02-26 PROCEDURE — 36415 COLL VENOUS BLD VENIPUNCTURE: CPT

## 2020-02-26 PROCEDURE — 83036 HEMOGLOBIN GLYCOSYLATED A1C: CPT

## 2020-02-26 PROCEDURE — 80053 COMPREHEN METABOLIC PANEL: CPT

## 2020-02-26 PROCEDURE — 85027 COMPLETE CBC AUTOMATED: CPT

## 2020-02-28 RX ORDER — LISINOPRIL 40 MG/1
TABLET ORAL
Qty: 90 TAB | Refills: 3 | Status: SHIPPED | OUTPATIENT
Start: 2020-02-28 | End: 2020-11-19

## 2020-03-04 DIAGNOSIS — E11.9 DIABETES MELLITUS TYPE II, NON INSULIN DEPENDENT (HCC): ICD-10-CM

## 2020-03-06 RX ORDER — PIOGLITAZONEHYDROCHLORIDE 30 MG/1
TABLET ORAL
Qty: 90 TAB | Refills: 1 | Status: SHIPPED | OUTPATIENT
Start: 2020-03-06 | End: 2020-08-11 | Stop reason: SDUPTHER

## 2020-03-07 DIAGNOSIS — D50.9 IRON DEFICIENCY ANEMIA, UNSPECIFIED IRON DEFICIENCY ANEMIA TYPE: ICD-10-CM

## 2020-03-10 RX ORDER — FERROUS FUMARATE 324(106)MG
TABLET ORAL
Qty: 180 TAB | Refills: 0 | Status: SHIPPED | OUTPATIENT
Start: 2020-03-10 | End: 2020-07-22 | Stop reason: SDUPTHER

## 2020-03-20 ENCOUNTER — DOCUMENTATION ONLY (OUTPATIENT)
Dept: FAMILY MEDICINE CLINIC | Age: 80
End: 2020-03-20

## 2020-03-20 NOTE — PROGRESS NOTES
Per Dr. Silvia Mcrae call patient to reschedule appointment. He does not want to chance possible exposure to COVID 23 given patient age and co morbidities.

## 2020-07-22 ENCOUNTER — CLINICAL SUPPORT (OUTPATIENT)
Dept: FAMILY MEDICINE CLINIC | Age: 80
End: 2020-07-22

## 2020-07-22 VITALS — DIASTOLIC BLOOD PRESSURE: 40 MMHG | HEART RATE: 62 BPM | SYSTOLIC BLOOD PRESSURE: 138 MMHG

## 2020-07-22 DIAGNOSIS — D50.9 IRON DEFICIENCY ANEMIA, UNSPECIFIED IRON DEFICIENCY ANEMIA TYPE: ICD-10-CM

## 2020-07-22 DIAGNOSIS — I10 ESSENTIAL HYPERTENSION WITH GOAL BLOOD PRESSURE LESS THAN 130/80: Primary | ICD-10-CM

## 2020-07-22 RX ORDER — FERROUS FUMARATE 324(106)MG
TABLET ORAL
Qty: 180 TAB | Refills: 1 | Status: SHIPPED | OUTPATIENT
Start: 2020-07-22 | End: 2021-02-03 | Stop reason: SDUPTHER

## 2020-07-22 NOTE — PROGRESS NOTES
Per  instructions patient presents today for a blood pressure check. Patient seated and both feet flat on the floor. Blood pressure taken and documented. Reported blood pressure to Dr. Eva Arredondo. Patient is instructed to continue current medications and keep schedule follow up with Dr. Eva Arredondo.

## 2020-08-11 ENCOUNTER — VIRTUAL VISIT (OUTPATIENT)
Dept: FAMILY MEDICINE CLINIC | Age: 80
End: 2020-08-11

## 2020-08-11 DIAGNOSIS — E87.5 HYPERKALEMIA: ICD-10-CM

## 2020-08-11 DIAGNOSIS — I10 ESSENTIAL HYPERTENSION WITH GOAL BLOOD PRESSURE LESS THAN 130/80: ICD-10-CM

## 2020-08-11 DIAGNOSIS — D50.9 IRON DEFICIENCY ANEMIA, UNSPECIFIED IRON DEFICIENCY ANEMIA TYPE: Primary | ICD-10-CM

## 2020-08-11 DIAGNOSIS — E11.9 DIABETES MELLITUS TYPE II, NON INSULIN DEPENDENT (HCC): ICD-10-CM

## 2020-08-11 RX ORDER — PIOGLITAZONEHYDROCHLORIDE 30 MG/1
TABLET ORAL
Qty: 90 TAB | Refills: 1 | Status: SHIPPED | OUTPATIENT
Start: 2020-08-11 | End: 2020-11-19

## 2020-08-11 RX ORDER — GLIMEPIRIDE 1 MG/1
TABLET ORAL
Qty: 90 TAB | Refills: 1 | Status: SHIPPED | OUTPATIENT
Start: 2020-08-11 | End: 2020-11-19

## 2020-08-11 NOTE — PROGRESS NOTES
1. Have you been to the ER, urgent care clinic since your last visit? Hospitalized since your last visit? No    2. Have you seen or consulted any other health care providers outside of the [address] [place] [address] since your last visit? Include any pap smears or colon screening.  No

## 2020-08-11 NOTE — PROGRESS NOTES
[facility]    CC: F/U for Chronic Disease Management    HPI:     Shayna Tellez, who was evaluated through a synchronous (real-time) audio-video encounter, and/or his healthcare decision maker, is aware that it is a billable service, with coverage as determined by his insurance carrier. He provided verbal consent to proceed: Yes, and patient identification was verified. It was conducted pursuant to the emergency declaration under the [address] [place] [address], [address] [place] [address] and the [employer] Act. A caregiver was present when appropriate. Ability to conduct physical exam was limited. I was at home. The patient was at home. Hyperkalemia:  -Got requested lab work  -Has been taking new dose of HCTZ  -Denies any side effects or issues with the medication        Iron Deficiency Anemia:  -Got requested lab work-  -Taking iron supplement as prescribed  -Denies any side effects or issues with the supplement        HTN:  -Got requested lab work  -Taking BP medication as prescribed. -Denies any issues or side effects with BP medication  -Did nurse visit on 7/22/2020 for BP check and it was 138/40  -Has been checking BP at home  -Reports his home BP is in the 120s/50s  -States that today his blood pressure was 134/57  -Diet is unchanged since last visit  -Has not been following a regular exercise regimen       DMII:  -Got requested lab work  -Taking diabetic medication as prescribed.    -Denies any side effects or issues with diabetic medication  -Checks blood sugar at home  -Reports FBS of 80s-90s  -Does not follow a regular exercise regimen  -Diet is unchanged since last visit      ROS: Positive items marked in RED  CON: fever, chills, dizziness, lightheadedness  Cardiovascular: palpitations, CP  Resp: SOB, cough  GI: nausea, vomiting, diarrhea  : dysuria, hematuria    Past Medical History:   Diagnosis Date    Cataract  Diabetes mellitus, type II ([facility] [address]     Iron deficiency anemia     Tuberculosis        Past Surgical History:   Procedure Laterality Date    HX BELOW KNEE AMPUTATION Right     HX CATARACT REMOVAL      HX ORTHOPAEDIC      HX PROSTATECTOMY      HX REFRACTIVE SURGERY  2009       Family History   Problem Relation Age of Onset    No Known Problems Mother     Alcohol abuse Father        Social History     Tobacco Use    Smoking status: Never Smoker    Smokeless tobacco: Never Used   Substance Use Topics    Alcohol use: No    Drug use: No       No Known Allergies      Current Outpatient Medications:     ferrous fumarate (Ferrocite) 324 mg (106 mg iron) tab, TAKE 1 TABLET BY MOUTH TWICE DAILY, Disp: 180 Tab, Rfl: 1    pioglitazone (ACTOS) 30 mg tablet, TAKE 1 TABLET BY MOUTH DAILY, Disp: 90 Tab, Rfl: 1    lisinopril (PRINIVIL, ZESTRIL) 40 mg tablet, TAKE 1 TABLET BY MOUTH DAILY, Disp: 90 Tab, Rfl: 3    metFORMIN (GLUCOPHAGE) 1,000 mg tablet, TAKE 1 TABLET BY MOUTH TWICE DAILY WITH MEALS, Disp: 180 Tab, Rfl: 2    hydroCHLOROthiazide (HYDRODIURIL) 50 mg tablet, Take 1 Tab by mouth daily. , Disp: 90 Tab, Rfl: 5    aspirin delayed-release 81 mg tablet, TAKE 1 TABLET BY MOUTH ONCE DAILY, Disp: 90 Tab, Rfl: 5    simvastatin (ZOCOR) 40 mg tablet, TAKE 1 TABLET BY MOUTH EVERY NIGHT, Disp: 90 Tab, Rfl: 3    glimepiride (AMARYL) 1 mg tablet, TAKE 1 TABLET BY MOUTH DAILY BEFORE BREAKFAST, Disp: 90 Tab, Rfl: 3    cyanocobalamin (VITAMIN B-12) 1,000 mcg tablet, Take 1 Tab by mouth daily. , Disp: 90 Tab, Rfl: 0    fluticasone (FLONASE) 50 mcg/actuation nasal spray, 2 Sprays by Both Nostrils route daily. , Disp: 1 Bottle, Rfl: 8    Physical Exam:      General: WD, WN, NAD, conversant  Eyes: sclera clear bilaterally, no discharge noted, eyelids normal in appearance, EOMI  HENT: NCAT  Neck: no masses visualized, trachea appears to be midline  Lungs: normal respiratory effort and rate, No visualized signs of difficulty breathing or respiratory distress  MS: normal AROM of neck noted  Skin: no significant exanthematous lesions or discoloration noted on neck/facial skin    Psych: alert and oriented to person, place and situation, normal affect  Neuro: speech normal, moving all upper extremities, no gaze palsy, no facial asymmetry (Cranial nerve 7 motor function) (limited exam due to video visit)    Results for Abraham Auguste (MRN [id number]):   Ref. Range 2/26/2020 15:07   WBC Latest Ref Range: 4.6 - 13.2 K/uL 4.7   RBC Latest Ref Range: 4.70 - 5.50 M/uL 3.20 (L)   HGB Latest Ref Range: 13.0 - 16.0 g/dL 10.1 (L)   HCT Latest Ref Range: 36.0 - 48.0 % 31.6 (L)   MCV Latest Ref Range: 74.0 - 97.0 FL 98.8 (H)   MCH Latest Ref Range: 24.0 - 34.0 PG 31.6   MCHC Latest Ref Range: 31.0 - 37.0 g/dL 32.0   RDW Latest Ref Range: 11.6 - 14.5 % 13.6   PLATELET Latest Ref Range: 135 - 420 K/uL 205   MPV Latest Ref Range: 9.2 - 11.8 FL 10.6   Sodium Latest Ref Range: 136 - 145 mmol/L 144   Potassium Latest Ref Range: 3.5 - 5.5 mmol/L 5.1   Chloride Latest Ref Range: 100 - 111 mmol/L 111   CO2 Latest Ref Range: 21 - 32 mmol/L 27   Anion gap Latest Ref Range: 3.0 - 18 mmol/L 6   Glucose Latest Ref Range: 74 - 99 mg/dL 59 (L)   BUN Latest Ref Range: 7.0 - 18 MG/DL 34 (H)   Creatinine Latest Ref Range: 0.6 - 1.3 MG/DL 1.08   BUN/Creatinine ratio Latest Ref Range: 12 - 20   31 (H)   Calcium Latest Ref Range: 8.5 - 10.1 MG/DL 9.3   GFR est non-AA Latest Ref Range: >60 ml/min/1.73m2 >60   GFR est AA Latest Ref Range: >60 ml/min/1.73m2 >60   Bilirubin, total Latest Ref Range: 0.2 - 1.0 MG/DL 0.2   Protein, total Latest Ref Range: 6.4 - 8.2 g/dL 7.3   Albumin Latest Ref Range: 3.4 - 5.0 g/dL 3.9   Globulin Latest Ref Range: 2.0 - 4.0 g/dL 3.4   A-G Ratio Latest Ref Range: 0.8 - 1.7   1.1   ALT Latest Ref Range: 16 - 61 U/L 17   AST Latest Ref Range: 10 - 38 U/L 14   Alk.  phosphatase Latest Ref Range: 45 - 117 U/L 68   Hemoglobin A1c, (calculated) Latest Ref Range: 4.2 - 5.6 % 5.7 (H)   Est. average glucose Latest Units: mg/dL 117       Assessment/Plan     Iron Deficiency Anemia, improving:  -Will continue current iron supplement regimen  -CBC ordered  -Follow-up in 1 month        HTN, improving:  -SBP notably improved from prior visits, but remains not at goal of <130  -Will defer adjusting BP medication given his chronic low diastolic blood pressure   -Fasting lipid panel ordered  -Follow-up in 1 month       DMII, well controlled:  -HGBA1c at goal of <7%  -Will continue current diabetic medication regimen  -Fasting lipid panel ordered  -Follow-up in 1 month      Hyperkalemia, resolved:  -Suspect it was secondary to lisinopril use  -Currently no indication for further intervention  -Follow-up in 1 month      Aaron Treadwell is a [de-identified] y.o. male being evaluated by a Virtual Visit (video visit) encounter to address concerns as mentioned above. A caregiver was present when appropriate. Due to this being a TeleHealth encounter ([facility]), evaluation of the following organ systems was limited: Vitals/Constitutional/EENT/Resp/CV/GI//MS/Neuro/Skin/Heme-Lymph-Imm. Pursuant to the emergency declaration under the [address] [place] [address] authority and the [employer] and [employer] Act, this Virtual Visit was conducted with patient's (and/or legal guardian's) consent, to reduce the risk of exposure to COVID-19 and provide necessary medical care. Services were provided through a video synchronous discussion virtually to substitute for in-person encounter. --Devora Coles MD on 8/11/2020 at 3:42 PM    An electronic signature was used to authenticate this note.

## 2020-09-03 ENCOUNTER — CLINICAL SUPPORT (OUTPATIENT)
Dept: FAMILY MEDICINE CLINIC | Age: 80
End: 2020-09-03

## 2020-09-03 ENCOUNTER — HOSPITAL ENCOUNTER (OUTPATIENT)
Dept: LAB | Age: 80
Discharge: HOME | End: 2020-09-03
Payer: MEDICARE

## 2020-09-03 DIAGNOSIS — Z23 ENCOUNTER FOR IMMUNIZATION: Primary | ICD-10-CM

## 2020-09-03 DIAGNOSIS — D50.9 IRON DEFICIENCY ANEMIA, UNSPECIFIED IRON DEFICIENCY ANEMIA TYPE: ICD-10-CM

## 2020-09-03 DIAGNOSIS — I10 ESSENTIAL HYPERTENSION WITH GOAL BLOOD PRESSURE LESS THAN 130/80: ICD-10-CM

## 2020-09-03 LAB
CHOLEST SERPL-MCNC: 165 MG/DL
ERYTHROCYTE [DISTWIDTH] IN BLOOD BY AUTOMATED COUNT: 13.6 % (ref 11.6–14.5)
HCT VFR BLD AUTO: 32.2 % (ref 36–48)
HDLC SERPL-MCNC: 77 MG/DL (ref 40–60)
HDLC SERPL: 2.1 {RATIO} (ref 0–5)
HGB BLD-MCNC: 10.1 G/DL (ref 13–16)
LDLC SERPL CALC-MCNC: 72.2 MG/DL (ref 0–100)
LIPID PROFILE,FLP: ABNORMAL
MCH RBC QN AUTO: 31.5 PG (ref 24–34)
MCHC RBC AUTO-ENTMCNC: 31.4 G/DL (ref 31–37)
MCV RBC AUTO: 100.3 FL (ref 74–97)
PLATELET # BLD AUTO: 213 K/UL (ref 135–420)
PMV BLD AUTO: 10.5 FL (ref 9.2–11.8)
RBC # BLD AUTO: 3.21 M/UL (ref 4.7–5.5)
TRIGL SERPL-MCNC: 79 MG/DL (ref ?–150)
VLDLC SERPL CALC-MCNC: 15.8 MG/DL
WBC # BLD AUTO: 3.6 K/UL (ref 4.6–13.2)

## 2020-09-03 PROCEDURE — 85027 COMPLETE CBC AUTOMATED: CPT

## 2020-09-03 PROCEDURE — 36415 COLL VENOUS BLD VENIPUNCTURE: CPT

## 2020-09-03 PROCEDURE — 80061 LIPID PANEL: CPT

## 2020-09-03 NOTE — PATIENT INSTRUCTIONS
Vaccine Information Statement Influenza (Flu) Vaccine (Inactivated or Recombinant): What You Need to Know Many Vaccine Information Statements are available in [place] and other languages. See www.immunize.org/vis Hojas de información sobre vacunas están disponibles en español y en muchos otros idiomas. Visite www.immunize.org/vis 1. Why get vaccinated? Influenza vaccine can prevent influenza (flu). Flu is a contagious disease that spreads around the [facility] every year, usually between October and May. Anyone can get the flu, but it is more dangerous for some people. Infants and young children, people 72years of age and older, pregnant women, and people with certain health conditions or a weakened immune system are at greatest risk of flu complications. Pneumonia, bronchitis, sinus infections and ear infections are examples of flu-related complications. If you have a medical condition, such as heart disease, cancer or diabetes, flu can make it worse. Flu can cause fever and chills, sore throat, muscle aches, fatigue, cough, headache, and runny or stuffy nose. Some people may have vomiting and diarrhea, though this is more common in children than adults. Each year thousands of people in the [facility] die from flu, and many more are hospitalized. Flu vaccine prevents millions of illnesses and flu-related visits to the doctor each year. 2. Influenza vaccines CDC recommends everyone 10months of age and older get vaccinated every flu season. Children 6 months through 6years of age may need 2 doses during a single flu season. Everyone else needs only 1 dose each flu season. It takes about 2 weeks for protection to develop after vaccination. There are many flu viruses, and they are always changing. Each year a new flu vaccine is made to protect against three or four viruses that are likely to cause disease in the upcoming flu season.  Even when the vaccine doesnt exactly match these viruses, it may still provide some protection. Influenza vaccine does not cause flu. Influenza vaccine may be given at the same time as other vaccines. 3. Talk with your health care provider Tell your vaccine provider if the person getting the vaccine: 
 Has had an allergic reaction after a previous dose of influenza vaccine, or has any severe, life-threatening allergies.  Has ever had Guillain-Barré Syndrome (also called GBS). In some cases, your health care provider may decide to postpone influenza vaccination to a future visit. People with minor illnesses, such as a cold, may be vaccinated. People who are moderately or severely ill should usually wait until they recover before getting influenza vaccine. Your health care provider can give you more information. 4. Risks of a reaction  Soreness, redness, and swelling where shot is given, fever, muscle aches, and headache can happen after influenza vaccine.  There may be a very small increased risk of Guillain-Barré Syndrome (GBS) after inactivated influenza vaccine (the flu shot). [employer] children who get the flu shot along with pneumococcal vaccine (PCV13), and/or DTaP vaccine at the same time might be slightly more likely to have a seizure caused by fever. Tell your health care provider if a child who is getting flu vaccine has ever had a seizure. People sometimes faint after medical procedures, including vaccination. Tell your provider if you feel dizzy or have vision changes or ringing in the ears. As with any medicine, there is a very remote chance of a vaccine causing a severe allergic reaction, other serious injury, or death. 5. What if there is a serious problem? An allergic reaction could occur after the vaccinated person leaves the clinic.  If you see signs of a severe allergic reaction (hives, swelling of the face and throat, difficulty breathing, a fast heartbeat, dizziness, or weakness), call 9-1-1 and get the person to the nearest hospital. 
 
For other signs that concern you, call your health care provider. Adverse reactions should be reported to the Vaccine Adverse Event Reporting System (VAERS). Your health care provider will usually file this report, or you can do it yourself. Visit the VAERS website at www.vaers. hhs.gov or call 3-872.280.1495. VAERS is only for reporting reactions, and VAERS staff do not give medical advice. 6. The National Vaccine Injury Compensation Program 
 
The [facility] Vaccine Injury Compensation Program (VICP) is a federal program that was created to compensate people who may have been injured by certain vaccines. Visit the VICP website at www.hrsa.gov/vaccinecompensation or call 0-125.996.8831 to learn about the program and about filing a claim. There is a time limit to file a claim for compensation. 7. How can I learn more?  Ask your health care provider.  Call your local or state health department.  Contact the Centers for Disease Control and Prevention (CDC): 
- Call 7-322.328.1199 (1-800-CDC-INFO) or 
- Visit CDCs influenza website at www.cdc.gov/flu Vaccine Information Statement (Interim) Inactivated Influenza Vaccine 8/15/2019 
[address] 194[place]-84 Department of Health and [place] Centers for Disease Control and Prevention Office Use Only

## 2020-09-03 NOTE — PROGRESS NOTES
Jose Rhdoes is a [de-identified] y.o. male who presents for routine immunization. He denies any symptoms , reactions or allergies that would exclude them from being immunized today. Risks and adverse reactions were discussed and the VIS was given to them. All questions were addressed. He was observed for 0 min post injection as patient was ready to leave. There were no reactions observed.     Esthela Nunez LPN

## 2020-09-10 ENCOUNTER — VIRTUAL VISIT (OUTPATIENT)
Dept: FAMILY MEDICINE CLINIC | Age: 80
End: 2020-09-10

## 2020-09-10 DIAGNOSIS — R80.9 TYPE 2 DIABETES MELLITUS WITH MICROALBUMINURIA, WITHOUT LONG-TERM CURRENT USE OF INSULIN (HCC): ICD-10-CM

## 2020-09-10 DIAGNOSIS — E78.5 HYPERLIPIDEMIA, UNSPECIFIED HYPERLIPIDEMIA TYPE: ICD-10-CM

## 2020-09-10 DIAGNOSIS — D50.9 IRON DEFICIENCY ANEMIA, UNSPECIFIED IRON DEFICIENCY ANEMIA TYPE: Primary | ICD-10-CM

## 2020-09-10 DIAGNOSIS — E11.29 TYPE 2 DIABETES MELLITUS WITH MICROALBUMINURIA, WITHOUT LONG-TERM CURRENT USE OF INSULIN (HCC): ICD-10-CM

## 2020-09-10 RX ORDER — BLOOD-GLUCOSE METER
EACH MISCELLANEOUS
Qty: 50 STRIP | Refills: 5 | Status: SHIPPED | OUTPATIENT
Start: 2020-09-10

## 2020-09-10 NOTE — PROGRESS NOTES
1. Have you been to the ER, urgent care clinic since your last visit? Hospitalized since your last visit? No    2. Have you seen or consulted any other health care providers outside of the [address] [place] [address] since your last visit? Include any pap smears or colon screening.  No

## 2020-09-10 NOTE — PROGRESS NOTES
[facility]    CC: Follow-up for Chronic Disease Management    HPI:     John Kim, who was evaluated through a synchronous (real-time) audio-video encounter, and/or his healthcare decision maker, is aware that it is a billable service, with coverage as determined by his insurance carrier. He provided verbal consent to proceed: Yes, and patient identification was verified. It was conducted pursuant to the emergency declaration under the [address] [place] [address], [address] [place] [address] and the [employer] General Act. A caregiver was present when appropriate. Ability to conduct physical exam was limited. I was at home. The patient was at home.       Iron Deficiency Anemia:  -Got requested lab work  -Taking iron supplement as prescribed   -Denies any side effects or issues with the supplement  -Diet is unchanged since last visit      HLD:  -Got requested lab work  -Taking statin as prescribed  -Denies any side effects or issues with the medication  -Diet is unchanged since last visit  -Not following any regular exercise regimen      ROS: Positive items marked in RED  CON: fever, chills  Cardiovascular: palpitations, CP  Resp: SOB, cough  GI: nausea, vomiting, diarrhea  : dysuria, hematuria      Past Medical History:   Diagnosis Date    Cataract     Diabetes mellitus, type II ([address]     Iron deficiency anemia     Tuberculosis        Past Surgical History:   Procedure Laterality Date    HX BELOW KNEE AMPUTATION Right     HX CATARACT REMOVAL      HX ORTHOPAEDIC      HX PROSTATECTOMY      HX REFRACTIVE SURGERY  2009       Family History   Problem Relation Age of Onset    No Known Problems Mother     Alcohol abuse Father        Social History     Tobacco Use    Smoking status: Never Smoker    Smokeless tobacco: Never Used   Substance Use Topics    Alcohol use: No    Drug use: No       No Known Allergies      Current Outpatient Medications:     metFORMIN (GLUCOPHAGE) 1,000 mg tablet, TAKE 1 TABLET BY MOUTH TWICE DAILY WITH MEALS, Disp: 180 Tab, Rfl: 2    hydroCHLOROthiazide (HYDRODIURIL) 50 mg tablet, Take 1 Tab by mouth daily. , Disp: 90 Tab, Rfl: 5    glimepiride (AMARYL) 1 mg tablet, TAKE 1 TABLET BY MOUTH DAILY BEFORE BREAKFAST, Disp: 90 Tab, Rfl: 1    pioglitazone (ACTOS) 30 mg tablet, TAKE 1 TABLET BY MOUTH DAILY, Disp: 90 Tab, Rfl: 1    ferrous fumarate (Ferrocite) 324 mg (106 mg iron) tab, TAKE 1 TABLET BY MOUTH TWICE DAILY, Disp: 180 Tab, Rfl: 1    lisinopril (PRINIVIL, ZESTRIL) 40 mg tablet, TAKE 1 TABLET BY MOUTH DAILY, Disp: 90 Tab, Rfl: 3    aspirin delayed-release 81 mg tablet, TAKE 1 TABLET BY MOUTH ONCE DAILY, Disp: 90 Tab, Rfl: 5    simvastatin (ZOCOR) 40 mg tablet, TAKE 1 TABLET BY MOUTH EVERY NIGHT, Disp: 90 Tab, Rfl: 3    cyanocobalamin (VITAMIN B-12) 1,000 mcg tablet, Take 1 Tab by mouth daily. , Disp: 90 Tab, Rfl: 0    fluticasone (FLONASE) 50 mcg/actuation nasal spray, 2 Sprays by Both Nostrils route daily. , Disp: 1 Bottle, Rfl: 8    Physical Exam:      General: WD, WN, NAD, conversant  Eyes: sclera clear bilaterally, no discharge noted, eyelids normal in appearance, EOMI  HENT: NCAT  Neck: no masses visualized, trachea appears to be midline  Lungs: normal respiratory effort and rate, No visualized signs of difficulty breathing or respiratory distress  MS: normal AROM of neck noted  Skin: no significant exanthematous lesions or discoloration noted on neck/facial skin    Psych: alert and oriented to person, place and situation, normal affect  Neuro: speech normal, moving all upper extremities, no gaze palsy, no facial asymmetry (Cranial nerve 7 motor function) (limited exam due to video visit)      Results for Mame Baca (MRN [id number]):   Ref.  Range 9/3/2020 10:08   WBC Latest Ref Range: 4.6 - 13.2 K/uL 3.6 (L)   RBC Latest Ref Range: 4.70 - 5.50 M/uL 3.21 (L)   HGB Latest Ref Range: 13.0 - 16.0 g/dL 10.1 (L)   HCT Latest Ref Range: 36.0 - 48.0 % 32.2 (L)   MCV Latest Ref Range: 74.0 - 97.0 .3 (H)   MCH Latest Ref Range: 24.0 - 34.0 PG 31.5   MCHC Latest Ref Range: 31.0 - 37.0 g/dL 31.4   RDW Latest Ref Range: 11.6 - 14.5 % 13.6   PLATELET Latest Ref Range: 135 - 420 K/uL 213   MPV Latest Ref Range: 9.2 - 11.8 FL 10.5   Triglyceride Latest Ref Range: <150 MG/DL 79   Cholesterol, total Latest Ref Range: <200 MG/   HDL Cholesterol Latest Ref Range: 40 - 60 MG/DL 77 (H)   CHOL/HDL Ratio Latest Ref Range: 0 - 5.0   2.1   VLDL, calculated Latest Units: MG/DL 15.8   LDL, calculated Latest Ref Range: 0 - 100 MG/DL 72.2       Assessment/Plan     Iron Deficiency Anemia, mild/stable:  -Lab results reviewed with patient during visit  -Will continue current iron supplement regimen  -Follow-up in 2 months for Medicare wellness visit      HLD:  -Lab results reviewed with patient during visit  -Will continue current statin regimen per shared decision making  -Follow-up in 2 months for Medicare wellness visit        Concepcion Kevin is a [de-identified] y.o. male being evaluated by a Virtual Visit (video visit) encounter to address concerns as mentioned above. A caregiver was present when appropriate. Due to this being a TeleHealth encounter (During [facility]-21 public health emergency), evaluation of the following organ systems was limited: Vitals/Constitutional/EENT/Resp/CV/GI//MS/Neuro/Skin/Heme-Lymph-Imm. Pursuant to the emergency declaration under the [facility], [address] [place] [address] authority and the [employer] General Act, this Virtual Visit was conducted with patient's (and/or legal guardian's) consent, to reduce the risk of exposure to COVID-19 and provide necessary medical care. Services were provided through a video synchronous discussion virtually to substitute for in-person encounter.     --Nimisha Ly MD on 9/10/2020 at 3:08 PM    An electronic signature was used to authenticate this note.

## 2020-09-21 PROBLEM — E78.5 DYSLIPIDEMIA: Status: RESOLVED | Noted: 2018-12-08 | Resolved: 2020-09-21

## 2020-09-21 PROBLEM — D50.9 IRON DEFICIENCY ANEMIA: Status: ACTIVE | Noted: 2020-09-21

## 2020-09-21 PROBLEM — R80.9 TYPE 2 DIABETES MELLITUS WITH MICROALBUMINURIA, WITHOUT LONG-TERM CURRENT USE OF INSULIN (HCC): Status: ACTIVE | Noted: 2018-09-27

## 2020-09-21 PROBLEM — E11.29 TYPE 2 DIABETES MELLITUS WITH MICROALBUMINURIA, WITHOUT LONG-TERM CURRENT USE OF INSULIN (HCC): Status: ACTIVE | Noted: 2018-09-27

## 2020-11-15 DIAGNOSIS — I10 ESSENTIAL HYPERTENSION: ICD-10-CM

## 2020-11-15 DIAGNOSIS — E11.9 CONTROLLED TYPE 2 DIABETES MELLITUS WITHOUT COMPLICATION, WITHOUT LONG-TERM CURRENT USE OF INSULIN (HCC): ICD-10-CM

## 2020-11-15 DIAGNOSIS — E11.9 DIABETES MELLITUS TYPE II, NON INSULIN DEPENDENT (HCC): ICD-10-CM

## 2020-11-19 RX ORDER — PIOGLITAZONEHYDROCHLORIDE 30 MG/1
TABLET ORAL
Qty: 90 TAB | Refills: 1 | Status: SHIPPED | OUTPATIENT
Start: 2020-11-19 | End: 2020-11-20 | Stop reason: SDUPTHER

## 2020-11-19 RX ORDER — SIMVASTATIN 40 MG/1
TABLET, FILM COATED ORAL
Qty: 90 TAB | Refills: 1 | Status: SHIPPED | OUTPATIENT
Start: 2020-11-19 | End: 2020-11-20 | Stop reason: SDUPTHER

## 2020-11-19 RX ORDER — LISINOPRIL 40 MG/1
TABLET ORAL
Qty: 90 TAB | Refills: 1 | Status: SHIPPED | OUTPATIENT
Start: 2020-11-19 | End: 2020-11-20 | Stop reason: SDUPTHER

## 2020-11-19 RX ORDER — GLIMEPIRIDE 1 MG/1
TABLET ORAL
Qty: 90 TAB | Refills: 1 | Status: SHIPPED | OUTPATIENT
Start: 2020-11-19 | End: 2020-11-20 | Stop reason: SDUPTHER

## 2020-11-20 DIAGNOSIS — I10 ESSENTIAL HYPERTENSION: ICD-10-CM

## 2020-11-20 DIAGNOSIS — E11.9 CONTROLLED TYPE 2 DIABETES MELLITUS WITHOUT COMPLICATION, WITHOUT LONG-TERM CURRENT USE OF INSULIN (HCC): ICD-10-CM

## 2020-11-20 DIAGNOSIS — E11.9 DIABETES MELLITUS TYPE II, NON INSULIN DEPENDENT (HCC): ICD-10-CM

## 2020-11-23 RX ORDER — GLIMEPIRIDE 1 MG/1
TABLET ORAL
Qty: 90 TAB | Refills: 1 | Status: SHIPPED | OUTPATIENT
Start: 2020-11-23 | End: 2021-09-20 | Stop reason: SDUPTHER

## 2020-11-23 RX ORDER — PIOGLITAZONEHYDROCHLORIDE 30 MG/1
TABLET ORAL
Qty: 90 TAB | Refills: 1 | Status: SHIPPED | OUTPATIENT
Start: 2020-11-23 | End: 2021-09-20 | Stop reason: SDUPTHER

## 2020-11-23 RX ORDER — LISINOPRIL 40 MG/1
TABLET ORAL
Qty: 90 TAB | Refills: 1 | Status: SHIPPED | OUTPATIENT
Start: 2020-11-23 | End: 2021-09-20 | Stop reason: SDUPTHER

## 2020-11-23 RX ORDER — SIMVASTATIN 40 MG/1
TABLET, FILM COATED ORAL
Qty: 90 TAB | Refills: 1 | Status: SHIPPED | OUTPATIENT
Start: 2020-11-23 | End: 2021-09-20 | Stop reason: SDUPTHER

## 2021-03-04 ENCOUNTER — VIRTUAL VISIT (OUTPATIENT)
Dept: FAMILY MEDICINE CLINIC | Age: 81
End: 2021-03-04
Payer: MEDICARE

## 2021-03-04 DIAGNOSIS — E11.29 TYPE 2 DIABETES MELLITUS WITH MICROALBUMINURIA, WITHOUT LONG-TERM CURRENT USE OF INSULIN (HCC): ICD-10-CM

## 2021-03-04 DIAGNOSIS — I10 ESSENTIAL HYPERTENSION WITH GOAL BLOOD PRESSURE LESS THAN 130/80: Primary | ICD-10-CM

## 2021-03-04 DIAGNOSIS — R80.9 TYPE 2 DIABETES MELLITUS WITH MICROALBUMINURIA, WITHOUT LONG-TERM CURRENT USE OF INSULIN (HCC): ICD-10-CM

## 2021-03-04 DIAGNOSIS — E78.5 HYPERLIPIDEMIA, UNSPECIFIED HYPERLIPIDEMIA TYPE: ICD-10-CM

## 2021-03-04 PROCEDURE — G8427 DOCREV CUR MEDS BY ELIG CLIN: HCPCS | Performed by: INTERNAL MEDICINE

## 2021-03-04 PROCEDURE — 1101F PT FALLS ASSESS-DOCD LE1/YR: CPT | Performed by: INTERNAL MEDICINE

## 2021-03-04 PROCEDURE — G8421 BMI NOT CALCULATED: HCPCS | Performed by: INTERNAL MEDICINE

## 2021-03-04 PROCEDURE — G8510 SCR DEP NEG, NO PLAN REQD: HCPCS | Performed by: INTERNAL MEDICINE

## 2021-03-04 PROCEDURE — G8536 NO DOC ELDER MAL SCRN: HCPCS | Performed by: INTERNAL MEDICINE

## 2021-03-04 PROCEDURE — 99214 OFFICE O/P EST MOD 30 MIN: CPT | Performed by: INTERNAL MEDICINE

## 2021-03-04 PROCEDURE — G8756 NO BP MEASURE DOC: HCPCS | Performed by: INTERNAL MEDICINE

## 2021-03-04 NOTE — PROGRESS NOTES
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 . Today he is feeling well and has no complaints. Consent:  he and/or  healthcare decision maker is aware that this patient-initiated Telehealth encounter is a billable service, with coverage as determined by her insurance carrier. he is aware that she may receive a bill and has provided verbal consent to proceed: Yes    I was at home while conducting this encounter. .  Establish Care  The history is provided by the patient, relative and medical records. Pertinent negatives include no chest pain, no abdominal pain, no headaches and no shortness of breath. Diabetes  The history is provided by the patient, relative and medical records. This is a chronic problem. The problem has been gradually improving. Pertinent negatives include no chest pain, no abdominal pain, no headaches and no shortness of breath. The symptoms are aggravated by eating. The symptoms are relieved by medications. Cholesterol Problem  The history is provided by the patient, relative and medical records. This is a chronic problem. The problem has been gradually improving. Pertinent negatives include no chest pain, no abdominal pain, no headaches and no shortness of breath. The symptoms are aggravated by eating. The symptoms are relieved by medications. Hypertension   The history is provided by the patient, relative and medical records. This is a chronic problem. Pertinent negatives include no chest pain, no orthopnea, no headaches, no peripheral edema, no dizziness and no shortness of breath. There are no associated agents to hypertension. Risk factors include diabetes mellitus, dyslipidemia and male gender.    No Known Allergies  Current Outpatient Medications on File Prior to Visit   Medication Sig Dispense Refill    ferrous fumarate (Ferrocite) 324 mg (106 mg iron) tab TAKE 1 TABLET BY MOUTH TWICE DAILY 90 Tab 0    glimepiride (AMARYL) 1 mg tablet TAKE 1 TABLET BY MOUTH DAILY BEFORE BREAKFAST 90 Tab 1    lisinopriL (PRINIVIL, ZESTRIL) 40 mg tablet TAKE 1 TABLET BY MOUTH DAILY 90 Tab 1    simvastatin (ZOCOR) 40 mg tablet TAKE 1 TABLET BY MOUTH EVERY NIGHT 90 Tab 1    pioglitazone (ACTOS) 30 mg tablet TAKE 1 TABLET BY MOUTH DAILY 90 Tab 1    glucose blood VI test strips (Embrace PRO test strips) strip Use to check blood sugar daily 50 Strip 5    metFORMIN (GLUCOPHAGE) 1,000 mg tablet TAKE 1 TABLET BY MOUTH TWICE DAILY WITH MEALS 180 Tab 2    hydroCHLOROthiazide (HYDRODIURIL) 50 mg tablet Take 1 Tab by mouth daily. 90 Tab 5    aspirin delayed-release 81 mg tablet TAKE 1 TABLET BY MOUTH ONCE DAILY 90 Tab 5    cyanocobalamin (VITAMIN B-12) 1,000 mcg tablet Take 1 Tab by mouth daily. 90 Tab 0    fluticasone (FLONASE) 50 mcg/actuation nasal spray 2 Sprays by Both Nostrils route daily. 1 Bottle 8     No current facility-administered medications on file prior to visit.       Past Medical History:   Diagnosis Date    Cataract     Diabetes mellitus, type II ([facility] [address]     Iron deficiency anemia     Tuberculosis      Past Surgical History:   Procedure Laterality Date    HX BELOW KNEE AMPUTATION Right     HX CATARACT REMOVAL      HX ORTHOPAEDIC      HX PROSTATECTOMY      HX REFRACTIVE SURGERY  2009     Family History   Problem Relation Age of Onset    No Known Problems Mother     Alcohol abuse Father      Social History     Socioeconomic History    Marital status:      Spouse name: Not on file    Number of children: Not on file    Years of education: Not on file    Highest education level: Not on file   Occupational History    Not on file   Social Needs    Financial resource strain: Not on file    Food insecurity     Worry: Not on file     Inability: Not on file    Transportation needs     Medical: Not on file     Non-medical: Not on file   Tobacco Use    Smoking status: Never Smoker    Smokeless tobacco: Never Used   Substance and Sexual Activity    Alcohol use: No    Drug use: No    Sexual activity: Never   Lifestyle    Physical activity     Days per week: Not on file     Minutes per session: Not on file    Stress: Not on file   Relationships    Social connections     Talks on phone: Not on file     Gets together: Not on file     Attends Synagogue service: Not on file     Active member of club or organization: Not on file     Attends meetings of clubs or organizations: Not on file     Relationship status: Not on file    Intimate partner violence     Fear of current or ex partner: Not on file     Emotionally abused: Not on file     Physically abused: Not on file     Forced sexual activity: Not on file   Other Topics Concern    Not on file   Social History Narrative    Not on file         Review of Systems   Constitutional: Negative. Eyes: Negative. Respiratory: Negative. Negative for shortness of breath. Cardiovascular: Negative. Negative for chest pain and orthopnea. Gastrointestinal: Negative for abdominal pain. Musculoskeletal: Negative. Neurological: Negative. Negative for dizziness and headaches. Endo/Heme/Allergies: Negative. Psychiatric/Behavioral: Negative. There were no vitals taken for this visit. Physical Exam  Nursing note reviewed. Constitutional:       Appearance: He is well-developed. HENT:      Head: Normocephalic and atraumatic. Pulmonary:      Effort: Pulmonary effort is normal. No respiratory distress. Musculoskeletal: Normal range of motion. General: No tenderness. Skin:     General: Skin is dry. Coloration: Skin is not pale. Findings: No erythema or rash. Neurological:      Mental Status: He is alert and oriented to person, place, and time. Cranial Nerves: No cranial nerve deficit.       Coordination: Coordination normal.   Psychiatric:         Behavior: Behavior normal. Thought Content: Thought content normal.         ASSESSMENT and PLAN    ICD-10-CM ICD-9-CM    1. Essential hypertension with goal blood pressure less than 130/80  I10 401.9    2. Type 2 diabetes mellitus with microalbuminuria, without long-term current use of insulin (HCC)  E11.29 250.40     R80.9 791.0    3. Hyperlipidemia, unspecified hyperlipidemia type  E78.5 272.4    We discussed the expected course, resolution and complications of the diagnosis(es) in detail. Medication risks, benefits, costs, interactions, and alternatives were discussed as indicated. I advised her to contact the office if her condition worsens, changes or fails to improve as anticipated. She expressed understanding with the diagnosis(es) and plan. Pursuant to the emergency declaration under the [address] [place] [address], [facility] waiver authority and the [employer] Act, this Virtual  Visit was conducted, with patient's consent, to reduce the patient's risk of exposure to COVID-19 and provide continuity of care for an established patient. Services were provided through a video synchronous discussion virtually to substitute for in-person clinic visit. Olga Hernandez MD      Follow-up and Dispositions    · Return in about 4 weeks (around 4/1/2021) for Make an appointment to have blood work done, Make appointment for vital signs check.

## 2021-03-04 NOTE — PROGRESS NOTES
Chief Complaint   Patient presents with   [address] [facility] with [facility]    Other     Needs New Prosthetics for leg - faxed paperwork x 2 days ago          1. Have you been to the ER, urgent care clinic since your last visit? Hospitalized since your last visit? No    2. Have you seen or consulted any other health care providers outside of the [address] [place] [address] since your last visit? Include any pap smears or colon screening. Cardiology, Podiatry     Chief Complaint   Patient presents with   [address] [facility] with [employer]    Other     Needs New Prosthetics for leg - faxed paperwork x 2 days ago        3 most recent PHQ Screens 3/4/2021   Little interest or pleasure in doing things Not at all   Feeling down, depressed, irritable, or hopeless Not at all   Total Score PHQ 2 0     Fall Risk Assessment, last 12 mths 3/4/2021   Able to walk? Yes   Fall in past 12 months? 0   Do you feel unsteady? 0   Are you worried about falling 0   Number of falls in past 12 months -   Fall with injury? -               Learning Assessment 6/11/2018   PRIMARY LEARNER Patient   HIGHEST LEVEL OF EDUCATION - PRIMARY LEARNER  GRADUATED HIGH SCHOOL OR GED   PRIMARY LANGUAGE Turkish   LEARNER PREFERENCE PRIMARY DEMONSTRATION   ANSWERED BY son   RELATIONSHIP OTHER     There were no vitals taken for this visit.

## 2021-03-25 ENCOUNTER — APPOINTMENT (OUTPATIENT)
Dept: FAMILY MEDICINE CLINIC | Age: 81
End: 2021-03-25

## 2021-03-25 ENCOUNTER — HOSPITAL ENCOUNTER (OUTPATIENT)
Dept: LAB | Age: 81
Discharge: HOME | End: 2021-03-25
Payer: MEDICARE

## 2021-03-25 ENCOUNTER — CLINICAL SUPPORT (OUTPATIENT)
Dept: FAMILY MEDICINE CLINIC | Age: 81
End: 2021-03-25

## 2021-03-25 VITALS
SYSTOLIC BLOOD PRESSURE: 140 MMHG | RESPIRATION RATE: 17 BRPM | HEIGHT: 63 IN | WEIGHT: 143 LBS | DIASTOLIC BLOOD PRESSURE: 56 MMHG | BODY MASS INDEX: 25.34 KG/M2 | HEART RATE: 57 BPM | OXYGEN SATURATION: 99 %

## 2021-03-25 DIAGNOSIS — R80.9 TYPE 2 DIABETES MELLITUS WITH MICROALBUMINURIA, WITHOUT LONG-TERM CURRENT USE OF INSULIN (HCC): Primary | ICD-10-CM

## 2021-03-25 DIAGNOSIS — I10 ESSENTIAL HYPERTENSION WITH GOAL BLOOD PRESSURE LESS THAN 130/80: ICD-10-CM

## 2021-03-25 DIAGNOSIS — E11.29 TYPE 2 DIABETES MELLITUS WITH MICROALBUMINURIA, WITHOUT LONG-TERM CURRENT USE OF INSULIN (HCC): Primary | ICD-10-CM

## 2021-03-25 DIAGNOSIS — E78.5 HYPERLIPIDEMIA, UNSPECIFIED HYPERLIPIDEMIA TYPE: ICD-10-CM

## 2021-03-25 DIAGNOSIS — R80.9 TYPE 2 DIABETES MELLITUS WITH MICROALBUMINURIA, WITHOUT LONG-TERM CURRENT USE OF INSULIN (HCC): ICD-10-CM

## 2021-03-25 DIAGNOSIS — D50.9 IRON DEFICIENCY ANEMIA, UNSPECIFIED IRON DEFICIENCY ANEMIA TYPE: ICD-10-CM

## 2021-03-25 DIAGNOSIS — E11.29 TYPE 2 DIABETES MELLITUS WITH MICROALBUMINURIA, WITHOUT LONG-TERM CURRENT USE OF INSULIN (HCC): ICD-10-CM

## 2021-03-25 LAB
ALBUMIN SERPL-MCNC: 4.1 G/DL (ref 3.4–5)
ALBUMIN/GLOB SERPL: 1.2 {RATIO} (ref 0.8–1.7)
ALP SERPL-CCNC: 70 U/L (ref 45–117)
ALT SERPL-CCNC: 18 U/L (ref 16–61)
ANION GAP SERPL CALC-SCNC: 4 MMOL/L (ref 3–18)
APPEARANCE UR: CLEAR
AST SERPL-CCNC: 13 U/L (ref 10–38)
BACTERIA URNS QL MICRO: NEGATIVE /HPF
BASOPHILS # BLD: 0 K/UL (ref 0–0.1)
BASOPHILS NFR BLD: 1 % (ref 0–2)
BILIRUB SERPL-MCNC: 0.2 MG/DL (ref 0.2–1)
BILIRUB UR QL: NEGATIVE
BUN SERPL-MCNC: 38 MG/DL (ref 7–18)
BUN/CREAT SERPL: 34 (ref 12–20)
CALCIUM SERPL-MCNC: 9.3 MG/DL (ref 8.5–10.1)
CHLORIDE SERPL-SCNC: 112 MMOL/L (ref 100–111)
CHOLEST SERPL-MCNC: 173 MG/DL
CO2 SERPL-SCNC: 27 MMOL/L (ref 21–32)
COLOR UR: YELLOW
CREAT SERPL-MCNC: 1.13 MG/DL (ref 0.6–1.3)
CREAT UR-MCNC: 51 MG/DL (ref 30–125)
DIFFERENTIAL METHOD BLD: ABNORMAL
EOSINOPHIL # BLD: 0.3 K/UL (ref 0–0.4)
EOSINOPHIL NFR BLD: 7 % (ref 0–5)
ERYTHROCYTE [DISTWIDTH] IN BLOOD BY AUTOMATED COUNT: 14.1 % (ref 11.6–14.5)
EST. AVERAGE GLUCOSE BLD GHB EST-MCNC: 117 MG/DL
GLOBULIN SER CALC-MCNC: 3.5 G/DL (ref 2–4)
GLUCOSE SERPL-MCNC: 128 MG/DL (ref 74–99)
GLUCOSE UR STRIP.AUTO-MCNC: 100 MG/DL
HBA1C MFR BLD: 5.7 % (ref 4.2–5.6)
HCT VFR BLD AUTO: 32.5 % (ref 36–48)
HDLC SERPL-MCNC: 86 MG/DL (ref 40–60)
HDLC SERPL: 2 {RATIO} (ref 0–5)
HGB BLD-MCNC: 10.4 G/DL (ref 13–16)
HGB UR QL STRIP: ABNORMAL
KETONES UR QL STRIP.AUTO: NEGATIVE MG/DL
LDLC SERPL CALC-MCNC: 75.4 MG/DL (ref 0–100)
LEUKOCYTE ESTERASE UR QL STRIP.AUTO: NEGATIVE
LIPID PROFILE,FLP: ABNORMAL
LYMPHOCYTES # BLD: 1.1 K/UL (ref 0.9–3.6)
LYMPHOCYTES NFR BLD: 25 % (ref 21–52)
MCH RBC QN AUTO: 32.2 PG (ref 24–34)
MCHC RBC AUTO-ENTMCNC: 32 G/DL (ref 31–37)
MCV RBC AUTO: 100.6 FL (ref 74–97)
MICROALBUMIN UR-MCNC: 7.67 MG/DL (ref 0–3)
MICROALBUMIN/CREAT UR-RTO: 150 MG/G (ref 0–30)
MONOCYTES # BLD: 0.3 K/UL (ref 0.05–1.2)
MONOCYTES NFR BLD: 8 % (ref 3–10)
NEUTS SEG # BLD: 2.5 K/UL (ref 1.8–8)
NEUTS SEG NFR BLD: 59 % (ref 40–73)
NITRITE UR QL STRIP.AUTO: NEGATIVE
PH UR STRIP: 6.5 [PH] (ref 5–8)
PLATELET # BLD AUTO: 217 K/UL (ref 135–420)
PMV BLD AUTO: 10.6 FL (ref 9.2–11.8)
POTASSIUM SERPL-SCNC: 5.5 MMOL/L (ref 3.5–5.5)
PROT SERPL-MCNC: 7.6 G/DL (ref 6.4–8.2)
PROT UR STRIP-MCNC: NEGATIVE MG/DL
RBC # BLD AUTO: 3.23 M/UL (ref 4.7–5.5)
RBC #/AREA URNS HPF: NORMAL /HPF (ref 0–5)
SODIUM SERPL-SCNC: 143 MMOL/L (ref 136–145)
SP GR UR REFRACTOMETRY: 1.02 (ref 1–1.03)
TRIGL SERPL-MCNC: 58 MG/DL (ref ?–150)
UROBILINOGEN UR QL STRIP.AUTO: 0.2 EU/DL (ref 0.2–1)
VLDLC SERPL CALC-MCNC: 11.6 MG/DL
WBC # BLD AUTO: 4.2 K/UL (ref 4.6–13.2)
WBC URNS QL MICRO: NEGATIVE /HPF (ref 0–5)

## 2021-03-25 PROCEDURE — 81001 URINALYSIS AUTO W/SCOPE: CPT

## 2021-03-25 PROCEDURE — 80061 LIPID PANEL: CPT

## 2021-03-25 PROCEDURE — 36415 COLL VENOUS BLD VENIPUNCTURE: CPT

## 2021-03-25 PROCEDURE — 82043 UR ALBUMIN QUANTITATIVE: CPT

## 2021-03-25 PROCEDURE — 83036 HEMOGLOBIN GLYCOSYLATED A1C: CPT

## 2021-03-25 PROCEDURE — 80053 COMPREHEN METABOLIC PANEL: CPT

## 2021-03-25 PROCEDURE — 85025 COMPLETE CBC W/AUTO DIFF WBC: CPT

## 2021-03-25 NOTE — PROGRESS NOTES
Chief Complaint   Patient presents with    Blood Pressure Check     BP Readings from Last 3 Encounters:   07/22/20 138/40   01/23/20 167/43   11/27/19 159/50       Patient presents today for Nurse Visit BP/Vital sign check. Blood pressure taken manually in a sitting position with feet flat on the floor reading 162/56 Pulse 62 and 2nd BP reading after 15 minutes was 150/56 Pulse 57. I notified Dr. Stuart De Jesus and her recommendation is to continue current medication and keep follow up appointment scheduled 4/1/2021 to discuss lab results and BP.

## 2021-04-15 ENCOUNTER — VIRTUAL VISIT (OUTPATIENT)
Dept: FAMILY MEDICINE CLINIC | Age: 81
End: 2021-04-15
Payer: MEDICARE

## 2021-04-15 DIAGNOSIS — E11.29 TYPE 2 DIABETES MELLITUS WITH MICROALBUMINURIA, WITHOUT LONG-TERM CURRENT USE OF INSULIN (HCC): ICD-10-CM

## 2021-04-15 DIAGNOSIS — R80.9 TYPE 2 DIABETES MELLITUS WITH MICROALBUMINURIA, WITHOUT LONG-TERM CURRENT USE OF INSULIN (HCC): ICD-10-CM

## 2021-04-15 DIAGNOSIS — I10 ESSENTIAL HYPERTENSION WITH GOAL BLOOD PRESSURE LESS THAN 130/80: Primary | ICD-10-CM

## 2021-04-15 DIAGNOSIS — D64.9 ANEMIA, UNSPECIFIED TYPE: ICD-10-CM

## 2021-04-15 DIAGNOSIS — E78.5 HYPERLIPIDEMIA, UNSPECIFIED HYPERLIPIDEMIA TYPE: ICD-10-CM

## 2021-04-15 PROCEDURE — G8419 CALC BMI OUT NRM PARAM NOF/U: HCPCS | Performed by: INTERNAL MEDICINE

## 2021-04-15 PROCEDURE — G8427 DOCREV CUR MEDS BY ELIG CLIN: HCPCS | Performed by: INTERNAL MEDICINE

## 2021-04-15 PROCEDURE — G8536 NO DOC ELDER MAL SCRN: HCPCS | Performed by: INTERNAL MEDICINE

## 2021-04-15 PROCEDURE — G8756 NO BP MEASURE DOC: HCPCS | Performed by: INTERNAL MEDICINE

## 2021-04-15 PROCEDURE — 99214 OFFICE O/P EST MOD 30 MIN: CPT | Performed by: INTERNAL MEDICINE

## 2021-04-15 PROCEDURE — 1101F PT FALLS ASSESS-DOCD LE1/YR: CPT | Performed by: INTERNAL MEDICINE

## 2021-04-15 PROCEDURE — G8432 DEP SCR NOT DOC, RNG: HCPCS | Performed by: INTERNAL MEDICINE

## 2021-04-15 NOTE — PROGRESS NOTES
Chelsey Haque (son)  Chief Complaint   Patient presents with    Hypertension     115/54 checked at home    Diabetes     bs 80 fasting     Cholesterol Problem     1. Have you been to the ER, urgent care clinic since your last visit? Hospitalized since your last visit? No    2. Have you seen or consulted any other health care providers outside of the [address] [place] [address] since your last visit? Include any pap smears or colon screening.  No     Health Maintenance Due   Topic Date Due    Eye Exam Retinal or Dilated  Never done    Shingrix Vaccine Age 50> (1 of 2) Never done    Pneumococcal 65+ years (2 of 2 - PPSV23) 09/27/2019    Foot Exam Q1  07/01/2020

## 2021-04-15 NOTE — PROGRESS NOTES
HISTORY OF PRESENT ILLNESS  Maykel Menchaca is a [de-identified] y.o. male who presents to follow-up on hypertension diabetes hyperlipidemia and anemia. Labs have been reviewed with patient. Patient has a mild anemia at 10.1 which is most likely dilutional from the use of pioglitazone. A1c is 5.7 and cholesterol is well controlled. Consent:  he and/or  healthcare decision maker is aware that this patient-initiated Telehealth encounter is a billable service, with coverage as determined by her insurance carrier. he is aware that she may receive a bill and has provided verbal consent to proceed: Yes    I was at home while conducting this encounter. Hypertension   The history is provided by the patient, relative and medical records. This is a chronic problem. The problem has been rapidly improving. Pertinent negatives include no orthopnea, no peripheral edema and no dizziness. There are no associated agents to hypertension. Risk factors include diabetes mellitus, dyslipidemia and male gender. Diabetes  The history is provided by the patient, relative and medical records. This is a chronic problem. The problem has been gradually improving. The symptoms are aggravated by eating. The symptoms are relieved by medications. Cholesterol Problem  The history is provided by the patient, relative and medical records. This is a chronic problem. The problem has been gradually improving. The symptoms are aggravated by eating. The symptoms are relieved by medications. Anemia  The history is provided by the medical records. This is a chronic problem. The problem has not changed since onset. Nothing aggravates the symptoms. The symptoms are relieved by medications. Treatments tried: B12 folate and iron. No Known Allergies  Current Outpatient Medications on File Prior to Visit   Medication Sig Dispense Refill    hydroCHLOROthiazide (HYDRODIURIL) 50 mg tablet Take 1 Tab by mouth daily.  90 Tab 1    ferrous fumarate (Ferrocite) 324 mg (106 mg iron) tab TAKE 1 TABLET BY MOUTH TWICE DAILY 90 Tab 0    glimepiride (AMARYL) 1 mg tablet TAKE 1 TABLET BY MOUTH DAILY BEFORE BREAKFAST 90 Tab 1    lisinopriL (PRINIVIL, ZESTRIL) 40 mg tablet TAKE 1 TABLET BY MOUTH DAILY 90 Tab 1    simvastatin (ZOCOR) 40 mg tablet TAKE 1 TABLET BY MOUTH EVERY NIGHT 90 Tab 1    pioglitazone (ACTOS) 30 mg tablet TAKE 1 TABLET BY MOUTH DAILY 90 Tab 1    glucose blood VI test strips (Embrace PRO test strips) strip Use to check blood sugar daily 50 Strip 5    metFORMIN (GLUCOPHAGE) 1,000 mg tablet TAKE 1 TABLET BY MOUTH TWICE DAILY WITH MEALS 180 Tab 2    aspirin delayed-release 81 mg tablet TAKE 1 TABLET BY MOUTH ONCE DAILY 90 Tab 5    cyanocobalamin (VITAMIN B-12) 1,000 mcg tablet Take 1 Tab by mouth daily. 90 Tab 0    fluticasone (FLONASE) 50 mcg/actuation nasal spray 2 Sprays by Both Nostrils route daily. 1 Bottle 8     No current facility-administered medications on file prior to visit.       Past Medical History:   Diagnosis Date    Cataract     [facility] mellitus, type II ([facility] [address]     Iron deficiency anemia     Tuberculosis      Past Surgical History:   Procedure Laterality Date    HX BELOW KNEE AMPUTATION Right     HX CATARACT REMOVAL      HX ORTHOPAEDIC      HX PROSTATECTOMY      HX REFRACTIVE SURGERY  2009     Family History   Problem Relation Age of Onset    No Known Problems Mother     Alcohol abuse Father      Social History     Socioeconomic History    Marital status:      Spouse name: Not on file    Number of children: Not on file    Years of education: Not on file    Highest education level: Not on file   Occupational History    Not on file   Social Needs    Financial resource strain: Not on file    Food insecurity     Worry: Not on file     Inability: Not on file    Transportation needs     Medical: Not on file     Non-medical: Not on file   Tobacco Use    Smoking status: Never Smoker    Smokeless tobacco: Never Used   Substance and Sexual Activity    Alcohol use: No    Drug use: No    Sexual activity: Never   Lifestyle    Physical activity     Days per week: Not on file     Minutes per session: Not on file    Stress: Not on file   Relationships    Social connections     Talks on phone: Not on file     Gets together: Not on file     Attends Buddhist service: Not on file     Active member of club or organization: Not on file     Attends meetings of clubs or organizations: Not on file     Relationship status: Not on file    Intimate partner violence     Fear of current or ex partner: Not on file     Emotionally abused: Not on file     Physically abused: Not on file     Forced sexual activity: Not on file   Other Topics Concern    Not on file   Social History Narrative    Not on file         Review of Systems   Constitutional: Negative. Eyes: Negative. Respiratory: Negative. Cardiovascular: Negative. Negative for orthopnea. Musculoskeletal: Negative. Neurological: Negative. Negative for dizziness. Endo/Heme/Allergies: Negative. Psychiatric/Behavioral: Negative. There were no vitals taken for this visit. Physical Exam  Nursing note reviewed. Constitutional:       Appearance: He is well-developed. HENT:      Head: Normocephalic and atraumatic. Pulmonary:      Effort: Pulmonary effort is normal. No respiratory distress. Musculoskeletal: Normal range of motion. General: No tenderness. Skin:     General: Skin is dry. Coloration: Skin is not pale. Findings: No erythema or rash. Neurological:      Mental Status: He is alert and oriented to person, place, and time. Cranial Nerves: No cranial nerve deficit. Coordination: Coordination normal.   Psychiatric:         Behavior: Behavior normal.         Thought Content: Thought content normal.         ASSESSMENT and PLAN    ICD-10-CM ICD-9-CM    1.  Essential hypertension with goal blood pressure less than 130/80  I10 401.9 METABOLIC PANEL, COMPREHENSIVE      URINALYSIS W/ RFLX MICROSCOPIC   2. Type 2 diabetes mellitus with microalbuminuria, without long-term current use of insulin (HCC)  E11.29 250.40 HEMOGLOBIN A1C WITH EAG    R80.9 791.0 CBC WITH AUTOMATED DIFF      METABOLIC PANEL, COMPREHENSIVE      MICROALBUMIN, UR, RAND W/ MICROALB/CREAT RATIO      URINALYSIS W/ RFLX MICROSCOPIC   3. Hyperlipidemia, unspecified hyperlipidemia type  R39.1 325.9 METABOLIC PANEL, COMPREHENSIVE      LIPID PANEL   4. Anemia, unspecified type  D64.9 285.9 CBC WITH AUTOMATED DIFF      METABOLIC PANEL, COMPREHENSIVE   We discussed the expected course, resolution and complications of the diagnosis(es) in detail. Medication risks, benefits, costs, interactions, and alternatives were discussed as indicated. I advised her to contact the office if her condition worsens, changes or fails to improve as anticipated. She expressed understanding with the diagnosis(es) and plan. Pursuant to the emergency declaration under the [facility] waiver authority and the [employer] Act, this Virtual  Visit was conducted, with patient's consent, to reduce the patient's risk of exposure to COVID-19 and provide continuity of care for an established patient. Services were provided through a video synchronous discussion virtually to substitute for in-person clinic visit. Daniella Chacon MD      Follow-up and Dispositions    · Return in about 4 months (around 8/15/2021) for Make appointment for vital signs check, Make an appointment to have blood work done.

## 2021-07-26 ENCOUNTER — HOSPITAL ENCOUNTER (OUTPATIENT)
Dept: LAB | Age: 81
Discharge: HOME | End: 2021-07-26
Payer: MEDICARE

## 2021-07-26 LAB
ALBUMIN SERPL-MCNC: 4 G/DL (ref 3.4–5)
ALBUMIN/GLOB SERPL: 1.3 {RATIO} (ref 0.8–1.7)
ALP SERPL-CCNC: 78 U/L (ref 45–117)
ALT SERPL-CCNC: 21 U/L (ref 16–61)
ANION GAP SERPL CALC-SCNC: 5 MMOL/L (ref 3–18)
AST SERPL-CCNC: 17 U/L (ref 10–38)
BASOPHILS # BLD: 0.1 K/UL (ref 0–0.1)
BASOPHILS NFR BLD: 1 % (ref 0–2)
BILIRUB SERPL-MCNC: 0.3 MG/DL (ref 0.2–1)
BUN SERPL-MCNC: 38 MG/DL (ref 7–18)
BUN/CREAT SERPL: 34 (ref 12–20)
CALCIUM SERPL-MCNC: 9.2 MG/DL (ref 8.5–10.1)
CHLORIDE SERPL-SCNC: 114 MMOL/L (ref 100–111)
CHOLEST SERPL-MCNC: 175 MG/DL
CO2 SERPL-SCNC: 26 MMOL/L (ref 21–32)
CREAT SERPL-MCNC: 1.12 MG/DL (ref 0.6–1.3)
CREAT UR-MCNC: 84 MG/DL (ref 30–125)
DIFFERENTIAL METHOD BLD: ABNORMAL
EOSINOPHIL # BLD: 0.2 K/UL (ref 0–0.4)
EOSINOPHIL NFR BLD: 4 % (ref 0–5)
ERYTHROCYTE [DISTWIDTH] IN BLOOD BY AUTOMATED COUNT: 13.4 % (ref 11.6–14.5)
EST. AVERAGE GLUCOSE BLD GHB EST-MCNC: 120 MG/DL
GLOBULIN SER CALC-MCNC: 3.2 G/DL (ref 2–4)
GLUCOSE SERPL-MCNC: 82 MG/DL (ref 74–99)
HBA1C MFR BLD: 5.8 % (ref 4.2–5.6)
HCT VFR BLD AUTO: 31.9 % (ref 36–48)
HDLC SERPL-MCNC: 81 MG/DL (ref 40–60)
HDLC SERPL: 2.2 {RATIO} (ref 0–5)
HGB BLD-MCNC: 9.9 G/DL (ref 13–16)
LDLC SERPL CALC-MCNC: 76.2 MG/DL (ref 0–100)
LIPID PROFILE,FLP: ABNORMAL
LYMPHOCYTES # BLD: 1.2 K/UL (ref 0.9–3.6)
LYMPHOCYTES NFR BLD: 24 % (ref 21–52)
MCH RBC QN AUTO: 32.7 PG (ref 24–34)
MCHC RBC AUTO-ENTMCNC: 31 G/DL (ref 31–37)
MCV RBC AUTO: 105.3 FL (ref 74–97)
MICROALBUMIN UR-MCNC: 6.93 MG/DL (ref 0–3)
MICROALBUMIN/CREAT UR-RTO: 83 MG/G (ref 0–30)
MONOCYTES # BLD: 0.4 K/UL (ref 0.05–1.2)
MONOCYTES NFR BLD: 7 % (ref 3–10)
NEUTS SEG # BLD: 3.2 K/UL (ref 1.8–8)
NEUTS SEG NFR BLD: 63 % (ref 40–73)
PLATELET # BLD AUTO: 209 K/UL (ref 135–420)
PMV BLD AUTO: 10.2 FL (ref 9.2–11.8)
POTASSIUM SERPL-SCNC: 5.3 MMOL/L (ref 3.5–5.5)
PROT SERPL-MCNC: 7.2 G/DL (ref 6.4–8.2)
RBC # BLD AUTO: 3.03 M/UL (ref 4.35–5.65)
SODIUM SERPL-SCNC: 145 MMOL/L (ref 136–145)
TRIGL SERPL-MCNC: 89 MG/DL (ref ?–150)
VLDLC SERPL CALC-MCNC: 17.8 MG/DL
WBC # BLD AUTO: 5.1 K/UL (ref 4.6–13.2)

## 2021-07-26 PROCEDURE — 81003 URINALYSIS AUTO W/O SCOPE: CPT

## 2021-07-26 PROCEDURE — 36415 COLL VENOUS BLD VENIPUNCTURE: CPT

## 2021-07-26 PROCEDURE — 80053 COMPREHEN METABOLIC PANEL: CPT

## 2021-07-26 PROCEDURE — 82043 UR ALBUMIN QUANTITATIVE: CPT

## 2021-07-26 PROCEDURE — 85025 COMPLETE CBC W/AUTO DIFF WBC: CPT

## 2021-07-26 PROCEDURE — 80061 LIPID PANEL: CPT

## 2021-07-26 PROCEDURE — 83036 HEMOGLOBIN GLYCOSYLATED A1C: CPT

## 2021-07-31 LAB
APPEARANCE UR: CLEAR
BILIRUB UR QL: NEGATIVE
COLOR UR: YELLOW
GLUCOSE UR STRIP.AUTO-MCNC: NEGATIVE MG/DL
HGB UR QL STRIP: NEGATIVE
KETONES UR QL STRIP.AUTO: NEGATIVE MG/DL
LEUKOCYTE ESTERASE UR QL STRIP.AUTO: NEGATIVE
NITRITE UR QL STRIP.AUTO: NEGATIVE
PH UR STRIP: 5 [PH] (ref 5–8)
PROT UR STRIP-MCNC: NEGATIVE MG/DL
SP GR UR REFRACTOMETRY: 1.02 (ref 1–1.03)
UROBILINOGEN UR QL STRIP.AUTO: 0.2 EU/DL (ref 0.2–1)

## 2021-08-23 DIAGNOSIS — E11.9 DIABETES MELLITUS TYPE II, NON INSULIN DEPENDENT (HCC): ICD-10-CM

## 2021-08-23 RX ORDER — METFORMIN HYDROCHLORIDE 1000 MG/1
1000 TABLET ORAL 2 TIMES DAILY WITH MEALS
Qty: 180 TABLET | Refills: 2 | Status: SHIPPED | OUTPATIENT
Start: 2021-08-23 | End: 2021-11-24 | Stop reason: SDUPTHER

## 2021-08-31 DIAGNOSIS — I10 ESSENTIAL HYPERTENSION WITH GOAL BLOOD PRESSURE LESS THAN 130/80: ICD-10-CM

## 2021-09-03 RX ORDER — HYDROCHLOROTHIAZIDE 50 MG/1
50 TABLET ORAL DAILY
Qty: 90 TABLET | Refills: 1 | Status: SHIPPED | OUTPATIENT
Start: 2021-09-03 | End: 2022-02-23 | Stop reason: SDUPTHER

## 2021-09-20 DIAGNOSIS — D50.9 IRON DEFICIENCY ANEMIA, UNSPECIFIED IRON DEFICIENCY ANEMIA TYPE: ICD-10-CM

## 2021-09-20 DIAGNOSIS — E11.9 CONTROLLED TYPE 2 DIABETES MELLITUS WITHOUT COMPLICATION, WITHOUT LONG-TERM CURRENT USE OF INSULIN (HCC): ICD-10-CM

## 2021-09-20 DIAGNOSIS — I10 ESSENTIAL HYPERTENSION: ICD-10-CM

## 2021-09-20 DIAGNOSIS — E11.9 DIABETES MELLITUS TYPE II, NON INSULIN DEPENDENT (HCC): ICD-10-CM

## 2021-09-21 RX ORDER — FERROUS FUMARATE 324(106)MG
TABLET ORAL
Qty: 90 TABLET | Refills: 0 | Status: SHIPPED | OUTPATIENT
Start: 2021-09-21 | End: 2021-11-23 | Stop reason: SDUPTHER

## 2021-09-21 RX ORDER — GLIMEPIRIDE 1 MG/1
TABLET ORAL
Qty: 90 TABLET | Refills: 1 | Status: SHIPPED | OUTPATIENT
Start: 2021-09-21 | End: 2021-11-23

## 2021-09-21 RX ORDER — PIOGLITAZONEHYDROCHLORIDE 30 MG/1
TABLET ORAL
Qty: 90 TABLET | Refills: 1 | Status: SHIPPED | OUTPATIENT
Start: 2021-09-21 | End: 2022-02-23 | Stop reason: DRUGHIGH

## 2021-09-21 RX ORDER — SIMVASTATIN 40 MG/1
TABLET, FILM COATED ORAL
Qty: 90 TABLET | Refills: 1 | Status: SHIPPED | OUTPATIENT
Start: 2021-09-21 | End: 2022-02-23 | Stop reason: SDUPTHER

## 2021-09-21 RX ORDER — LISINOPRIL 40 MG/1
TABLET ORAL
Qty: 90 TABLET | Refills: 1 | Status: SHIPPED | OUTPATIENT
Start: 2021-09-21 | End: 2022-02-23 | Stop reason: SDUPTHER

## 2021-11-23 ENCOUNTER — OFFICE VISIT (OUTPATIENT)
Dept: FAMILY MEDICINE CLINIC | Age: 81
End: 2021-11-23
Payer: MEDICARE

## 2021-11-23 ENCOUNTER — HOSPITAL ENCOUNTER (OUTPATIENT)
Dept: LAB | Age: 81
Discharge: HOME | End: 2021-11-23
Payer: MEDICARE

## 2021-11-23 VITALS
TEMPERATURE: 98.3 F | OXYGEN SATURATION: 97 % | HEART RATE: 60 BPM | HEIGHT: 63 IN | RESPIRATION RATE: 18 BRPM | SYSTOLIC BLOOD PRESSURE: 166 MMHG | WEIGHT: 140 LBS | DIASTOLIC BLOOD PRESSURE: 58 MMHG | BODY MASS INDEX: 24.8 KG/M2

## 2021-11-23 DIAGNOSIS — E78.2 MIXED HYPERLIPIDEMIA: ICD-10-CM

## 2021-11-23 DIAGNOSIS — E11.29 TYPE 2 DIABETES MELLITUS WITH MICROALBUMINURIA, WITHOUT LONG-TERM CURRENT USE OF INSULIN (HCC): ICD-10-CM

## 2021-11-23 DIAGNOSIS — D50.9 IRON DEFICIENCY ANEMIA, UNSPECIFIED IRON DEFICIENCY ANEMIA TYPE: ICD-10-CM

## 2021-11-23 DIAGNOSIS — R80.9 TYPE 2 DIABETES MELLITUS WITH MICROALBUMINURIA, WITHOUT LONG-TERM CURRENT USE OF INSULIN (HCC): ICD-10-CM

## 2021-11-23 DIAGNOSIS — I10 ESSENTIAL HYPERTENSION WITH GOAL BLOOD PRESSURE LESS THAN 130/80: Primary | ICD-10-CM

## 2021-11-23 DIAGNOSIS — I10 ESSENTIAL HYPERTENSION WITH GOAL BLOOD PRESSURE LESS THAN 130/80: ICD-10-CM

## 2021-11-23 DIAGNOSIS — Z97.10 EMPLOYS PROSTHETIC LEG: ICD-10-CM

## 2021-11-23 PROBLEM — E11.319 DIABETIC RETINOPATHY ASSOCIATED WITH TYPE 2 DIABETES MELLITUS (HCC): Status: ACTIVE | Noted: 2021-11-23

## 2021-11-23 PROBLEM — I07.1 MILD TRICUSPID REGURGITATION: Status: ACTIVE | Noted: 2017-12-02

## 2021-11-23 LAB
ALBUMIN SERPL-MCNC: 4.1 G/DL (ref 3.4–5)
ALBUMIN/GLOB SERPL: 1.2 {RATIO} (ref 0.8–1.7)
ALP SERPL-CCNC: 83 U/L (ref 45–117)
ALT SERPL-CCNC: 21 U/L (ref 16–61)
ANION GAP SERPL CALC-SCNC: 5 MMOL/L (ref 3–18)
AST SERPL-CCNC: 22 U/L (ref 10–38)
BILIRUB SERPL-MCNC: 0.3 MG/DL (ref 0.2–1)
BUN SERPL-MCNC: 37 MG/DL (ref 7–18)
BUN/CREAT SERPL: 35 (ref 12–20)
CALCIUM SERPL-MCNC: 9.7 MG/DL (ref 8.5–10.1)
CHLORIDE SERPL-SCNC: 107 MMOL/L (ref 100–111)
CHOLEST SERPL-MCNC: 181 MG/DL
CO2 SERPL-SCNC: 29 MMOL/L (ref 21–32)
CREAT SERPL-MCNC: 1.05 MG/DL (ref 0.6–1.3)
EST. AVERAGE GLUCOSE BLD GHB EST-MCNC: 111 MG/DL
GLOBULIN SER CALC-MCNC: 3.4 G/DL (ref 2–4)
GLUCOSE SERPL-MCNC: 74 MG/DL (ref 74–99)
HBA1C MFR BLD: 5.5 % (ref 4.2–5.6)
HDLC SERPL-MCNC: 84 MG/DL (ref 40–60)
HDLC SERPL: 2.2 {RATIO} (ref 0–5)
LDLC SERPL CALC-MCNC: 81.4 MG/DL (ref 0–100)
LIPID PROFILE,FLP: ABNORMAL
POTASSIUM SERPL-SCNC: 5.4 MMOL/L (ref 3.5–5.5)
PROT SERPL-MCNC: 7.5 G/DL (ref 6.4–8.2)
SODIUM SERPL-SCNC: 141 MMOL/L (ref 136–145)
TRIGL SERPL-MCNC: 78 MG/DL (ref ?–150)
VLDLC SERPL CALC-MCNC: 15.6 MG/DL

## 2021-11-23 PROCEDURE — 1101F PT FALLS ASSESS-DOCD LE1/YR: CPT | Performed by: NURSE PRACTITIONER

## 2021-11-23 PROCEDURE — 80053 COMPREHEN METABOLIC PANEL: CPT

## 2021-11-23 PROCEDURE — G8420 CALC BMI NORM PARAMETERS: HCPCS | Performed by: NURSE PRACTITIONER

## 2021-11-23 PROCEDURE — G8432 DEP SCR NOT DOC, RNG: HCPCS | Performed by: NURSE PRACTITIONER

## 2021-11-23 PROCEDURE — G8754 DIAS BP LESS 90: HCPCS | Performed by: NURSE PRACTITIONER

## 2021-11-23 PROCEDURE — 83036 HEMOGLOBIN GLYCOSYLATED A1C: CPT

## 2021-11-23 PROCEDURE — 80061 LIPID PANEL: CPT

## 2021-11-23 PROCEDURE — 36415 COLL VENOUS BLD VENIPUNCTURE: CPT

## 2021-11-23 PROCEDURE — 99214 OFFICE O/P EST MOD 30 MIN: CPT | Performed by: NURSE PRACTITIONER

## 2021-11-23 PROCEDURE — G8753 SYS BP > OR = 140: HCPCS | Performed by: NURSE PRACTITIONER

## 2021-11-23 PROCEDURE — G8536 NO DOC ELDER MAL SCRN: HCPCS | Performed by: NURSE PRACTITIONER

## 2021-11-23 PROCEDURE — G8427 DOCREV CUR MEDS BY ELIG CLIN: HCPCS | Performed by: NURSE PRACTITIONER

## 2021-11-23 RX ORDER — FERROUS FUMARATE 324(106)MG
TABLET ORAL
Qty: 180 TABLET | Refills: 0 | Status: SHIPPED | OUTPATIENT
Start: 2021-11-23 | End: 2022-02-23 | Stop reason: SDUPTHER

## 2021-11-23 NOTE — PROGRESS NOTES
[address] [place] [address] [facility] [address]               [phone]      Todd Moore is a 80 y.o. male and presents with [facility]       Assessment/Plan:    Diagnoses and all orders for this visit:    1. Essential hypertension with goal blood pressure less than 318/63  -     METABOLIC PANEL, COMPREHENSIVE; Future  Endorses medication compliance, Follow-up labs today and blood pressure greater than 140/90, however, with his advanced age will not change medications due to risk of hypotension   2. Mixed hyperlipidemia  -     LIPID PANEL; Future  Endorses medication compliance, Follow-up labs today and Denies abdominal pain or symptoms of myalgia   3. Type 2 diabetes mellitus with microalbuminuria, without long-term current use of insulin (HCC)  -     HEMOGLOBIN A1C WITH EAG; Future  Endorses medication compliance, Follow-up labs today, Endorses symptoms consistent with hypoglycemia and states his blood glucose is low about 3-4 times a week, stop glimepiride   4. 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 Iron deficiency anemia, unspecified iron deficiency anemia type  -     ferrous fumarate (Ferrocite) 324 mg (106 mg iron) tab; TAKE 1 TABLET BY MOUTH TWICE DAILY  Refill provided       Follow-up and Dispositions    · Return in about 3 months (around 2/23/2022) for MAWV, DM, HLD, HTN, 30 min, office only. Health Maintenance:   Health Maintenance   Topic Date Due    Shingrix Vaccine Age 49> (1 of 2) Never done    Pneumococcal 65+ years (2 of 2 - PPSV23) 09/27/2019    Medicare Yearly Exam  12/01/2021    Foot Exam Q1  01/13/2022    MICROALBUMIN Q1  07/26/2022    Lipid Screen  11/23/2022    Eye Exam Retinal or Dilated  05/21/2023    DTaP/Tdap/Td series (2 - Td or Tdap) 10/15/2025    Flu Vaccine  Completed    COVID-19 Vaccine  Completed        Subjective:    Hypertension:   Patient reports taking medications as instructed.  yes   Medication side effects noted.no  Headache upon wakening. no   Home BP monitoring in range of 434/45'M systolic. Do you experience chest pain/pressure or SOB with exertion? no  Maintain a low Sodium diet? yes  Key CAD CHF Meds             lisinopriL (PRINIVIL, ZESTRIL) 40 mg tablet (Taking) TAKE 1 TABLET BY MOUTH DAILY    simvastatin (ZOCOR) 40 mg tablet (Taking) TAKE 1 TABLET BY MOUTH EVERY NIGHT    hydroCHLOROthiazide (HYDRODIURIL) 50 mg tablet (Taking) Take 1 Tablet by mouth daily. aspirin delayed-release 81 mg tablet (Taking) TAKE 1 TABLET BY MOUTH ONCE DAILY        BUN   Date Value Ref Range Status   11/23/2021 37 (H) 7.0 - 18 MG/DL Final     Creatinine   Date Value Ref Range Status   11/23/2021 1.05 0.6 - 1.3 MG/DL Final     GFR est AA   Date Value Ref Range Status   11/23/2021 >60 >60 ml/min/1.73m2 Final     Potassium   Date Value Ref Range Status   11/23/2021 5.4 3.5 - 5.5 mmol/L Final     DMII-   Patient reports medication compliance Daily  Diabetic diet compliance most of the time  Patient monitors blood sugars regularly weekly   Reports am fasting sugars range 92, 99,100, 105   Denies hypoglycemic episodes no, 3-4 days a week, at around noon, has symptoms that resolve with a little chocolate  Denies polyuria, polydipsia, paraesthesia, vision changes? yes  Engaging in daily exercise?  Yes: Comment: walks around the house     Diabetic Foot and Eye Exam HM Status   Topic Date Due    Diabetic Foot Care  01/13/2022    Eye Exam  05/21/2023     Hemoglobin A1c   Date Value Ref Range Status   11/23/2021 5.5 4.2 - 5.6 % Final     Comment:     (NOTE)  HbA1C Interpretive Ranges  <5.7              Normal  5.7 - 6.4         Consider Prediabetes  >6.5              Consider Diabetes     ]  Creatinine, urine   Date Value Ref Range Status   07/26/2021 84.00 30 - 125 mg/dL Final     Microalbumin/Creat ratio (mg/g creat)   Date Value Ref Range Status   07/26/2021 83 (H) 0 - 30 mg/g Final   03/25/2021 150 (H) 0 - 30 mg/g Final   09/04/2018 150 (H) 0 - 30 mg/g Final     Microalb/Creat ratio (ug/mg creat.)   Date Value Ref Range Status   09/10/2019 95.1 (H) 0.0 - 30.0 mg/g creat Final     Comment:                          Normal:                0.0 -  30.0                       Albuminuria:          31.0 - 300.0                       Clinical albuminuria:       >300.0       Key Antihyperglycemic Medications             pioglitazone (ACTOS) 30 mg tablet (Taking) TAKE 1 TABLET BY MOUTH DAILY    metFORMIN (GLUCOPHAGE) 1,000 mg tablet (Taking/Discontinued) Take 1 Tablet by mouth two (2) times daily (with meals). HLD:  Has been compliant with meds  Yes  Compliant with low-fat diet. most of the time    Denies myalgias or other side effects. yes  The ASCVD Risk score (Aga Richey., et al., 2013) failed to calculate for the following reasons: The 2013 ASCVD risk score is only valid for ages 36 to 78    Cholesterol, total   Date Value Ref Range Status   11/23/2021 181 <200 MG/DL Final     Triglyceride   Date Value Ref Range Status   11/23/2021 78 <150 MG/DL Final     Comment:     The drugs N-acetylcysteine (NAC) and  Metamiszole have been found to cause falsely  low results in this chemical assay. Please  be sure to submit blood samples obtained  BEFORE administration of either of these  drugs to assure correct results. HDL Cholesterol   Date Value Ref Range Status   11/23/2021 84 (H) 40 - 60 MG/DL Final     LDL, calculated   Date Value Ref Range Status   11/23/2021 81.4 0 - 100 MG/DL Final   ]  Key Antihyperlipidemia Meds             simvastatin (ZOCOR) 40 mg tablet (Taking) TAKE 1 TABLET BY MOUTH EVERY NIGHT             ROS:     ROS  As stated in HPI, otherwise all others negative. The problem list was updated as a part of today's visit.   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 SH:  Social History     Tobacco Use    Smoking status: Never Smoker    Smokeless tobacco: Never Used   Vaping Use    Vaping Use: Never used   Substance Use Topics    Alcohol use: No    Drug use: No       Medications/Allergies:  Current Outpatient Medications on File Prior to Visit   Medication Sig Dispense Refill    pioglitazone (ACTOS) 30 mg tablet TAKE 1 TABLET BY MOUTH DAILY 90 Tablet 1    lisinopriL (PRINIVIL, ZESTRIL) 40 mg tablet TAKE 1 TABLET BY MOUTH DAILY 90 Tablet 1    simvastatin (ZOCOR) 40 mg tablet TAKE 1 TABLET BY MOUTH EVERY NIGHT 90 Tablet 1    hydroCHLOROthiazide (HYDRODIURIL) 50 mg tablet Take 1 Tablet by mouth daily. 90 Tablet 1    glucose blood VI test strips (Embrace PRO test strips) strip Use to check blood sugar daily 50 Strip 5    aspirin delayed-release 81 mg tablet TAKE 1 TABLET BY MOUTH ONCE DAILY 90 Tab 5    cyanocobalamin (VITAMIN B-12) 1,000 mcg tablet Take 1 Tab by mouth daily. 90 Tab 0     No current facility-administered medications on file prior to visit. No Known Allergies    Objective:  Visit Vitals  BP (!) 166/58   Pulse 60   Temp 98.3 °F (36.8 °C) (Temporal)   Resp 18   Ht 5' 3\" (1.6 m)   Wt 140 lb (63.5 kg)   SpO2 97%   BMI 24.80 kg/m²    Body mass index is 24.8 kg/m². Physical assessment  Physical Exam  Vitals and nursing note reviewed. Eyes:      Conjunctiva/sclera: Conjunctivae normal.      Pupils: Pupils are equal, round, and reactive to light. Neck:      Vascular: No JVD. Cardiovascular:      Rate and Rhythm: Normal rate and regular rhythm. Heart sounds: Normal heart sounds. No murmur heard. No friction rub. No gallop.     Pulmonary: Effort: Pulmonary effort is normal.      Breath sounds: Normal breath sounds. Musculoskeletal:         General: Normal range of motion. Cervical back: Normal range of motion. Skin:     General: Skin is warm and dry. Neurological:      Mental Status: He is alert and oriented to person, place, and time. Psychiatric:         Mood and Affect: Affect normal.         Cognition and Memory: Memory normal.         Judgment: Judgment normal.           Labwork and Ancillary Studies:    CBC w/Diff  Lab Results   Component Value Date/Time    WBC 5.1 07/26/2021 09:58 AM    HGB 9.9 (L) 07/26/2021 09:58 AM    PLATELET 802 22/34/3290 09:58 AM         Basic Metabolic Profile  Lab Results   Component Value Date/Time    Sodium 141 11/23/2021 01:03 PM    Potassium 5.4 11/23/2021 01:03 PM    Chloride 107 11/23/2021 01:03 PM    CO2 29 11/23/2021 01:03 PM    Anion gap 5 11/23/2021 01:03 PM    Glucose 74 11/23/2021 01:03 PM    BUN 37 (H) 11/23/2021 01:03 PM    Creatinine 1.05 11/23/2021 01:03 PM    BUN/Creatinine ratio 35 (H) 11/23/2021 01:03 PM    GFR est AA >60 11/23/2021 01:03 PM    GFR est non-AA >60 11/23/2021 01:03 PM    Calcium 9.7 11/23/2021 01:03 PM        Cholesterol  Lab Results   Component Value Date/Time    Cholesterol, total 181 11/23/2021 01:03 PM    HDL Cholesterol 84 (H) 11/23/2021 01:03 PM    LDL, calculated 81.4 11/23/2021 01:03 PM    Triglyceride 78 11/23/2021 01:03 PM    CHOL/HDL Ratio 2.2 11/23/2021 01:03 PM           I have discussed the diagnosis with the patient and the intended plan as seen in the above orders. The patient has received an After-Visit Summary and questions were answered concerning future plans. An After Visit Summary was printed and given to the patient. All diagnosis have been discussed with the patient and all of the patient's questions have been answered.      Follow-up and Dispositions    · Return in about 3 months (around 2/23/2022) for MAWV, DM, HLD, HTN, 30 min, office only.           Caro Ríos, [facility]  [address] [facility]   [address] [facility] [address]  [address]

## 2021-11-23 NOTE — PROGRESS NOTES
Room  8    Did patient bring someone? Yes, Son Samantha Hallmark     Did the patient have DME equipment? No    Did you take your medication today? Yes    1. Have you been to the ER, urgent care clinic since your last visit? Hospitalized since your last visit? No    2. Have you seen or consulted any other health care providers outside of the [address] [place] [address] since your last visit? Include any pap smears or colon screening.  No      Health Maintenance Due   Topic Date Due    Shingrix Vaccine Age 49> (1 of 2) Never done    Pneumococcal 65+ years (2 of 2 - PPSV23) 09/27/2019    Medicare Yearly Exam  12/01/2021

## 2021-11-24 DIAGNOSIS — E11.9 DIABETES MELLITUS TYPE II, NON INSULIN DEPENDENT (HCC): ICD-10-CM

## 2021-11-24 RX ORDER — METFORMIN HYDROCHLORIDE 1000 MG/1
1000 TABLET ORAL 2 TIMES DAILY WITH MEALS
Qty: 180 TABLET | Refills: 2 | Status: SHIPPED | OUTPATIENT
Start: 2021-11-24 | End: 2022-02-23 | Stop reason: SDUPTHER

## 2022-02-23 ENCOUNTER — OFFICE VISIT (OUTPATIENT)
Dept: FAMILY MEDICINE CLINIC | Age: 82
End: 2022-02-23
Payer: MEDICARE

## 2022-02-23 VITALS
DIASTOLIC BLOOD PRESSURE: 64 MMHG | HEIGHT: 63 IN | OXYGEN SATURATION: 99 % | BODY MASS INDEX: 24.8 KG/M2 | WEIGHT: 140 LBS | TEMPERATURE: 98.2 F | RESPIRATION RATE: 18 BRPM | HEART RATE: 76 BPM | SYSTOLIC BLOOD PRESSURE: 130 MMHG

## 2022-02-23 DIAGNOSIS — I10 ESSENTIAL HYPERTENSION WITH GOAL BLOOD PRESSURE LESS THAN 130/80: ICD-10-CM

## 2022-02-23 DIAGNOSIS — E11.29 TYPE 2 DIABETES MELLITUS WITH MICROALBUMINURIA, WITHOUT LONG-TERM CURRENT USE OF INSULIN (HCC): ICD-10-CM

## 2022-02-23 DIAGNOSIS — I10 ESSENTIAL HYPERTENSION: ICD-10-CM

## 2022-02-23 DIAGNOSIS — E78.2 MIXED HYPERLIPIDEMIA: ICD-10-CM

## 2022-02-23 DIAGNOSIS — H91.93 DECREASED HEARING OF BOTH EARS: ICD-10-CM

## 2022-02-23 DIAGNOSIS — E11.9 CONTROLLED TYPE 2 DIABETES MELLITUS WITHOUT COMPLICATION, WITHOUT LONG-TERM CURRENT USE OF INSULIN (HCC): ICD-10-CM

## 2022-02-23 DIAGNOSIS — D50.9 IRON DEFICIENCY ANEMIA, UNSPECIFIED IRON DEFICIENCY ANEMIA TYPE: ICD-10-CM

## 2022-02-23 DIAGNOSIS — R80.9 TYPE 2 DIABETES MELLITUS WITH MICROALBUMINURIA, WITHOUT LONG-TERM CURRENT USE OF INSULIN (HCC): ICD-10-CM

## 2022-02-23 DIAGNOSIS — E11.319 DIABETIC RETINOPATHY ASSOCIATED WITH TYPE 2 DIABETES MELLITUS, MACULAR EDEMA PRESENCE UNSPECIFIED, UNSPECIFIED LATERALITY, UNSPECIFIED RETINOPATHY SEVERITY (HCC): ICD-10-CM

## 2022-02-23 DIAGNOSIS — Z00.00 MEDICARE ANNUAL WELLNESS VISIT, SUBSEQUENT: Primary | ICD-10-CM

## 2022-02-23 PROCEDURE — G8536 NO DOC ELDER MAL SCRN: HCPCS | Performed by: NURSE PRACTITIONER

## 2022-02-23 PROCEDURE — G8754 DIAS BP LESS 90: HCPCS | Performed by: NURSE PRACTITIONER

## 2022-02-23 PROCEDURE — G0439 PPPS, SUBSEQ VISIT: HCPCS | Performed by: NURSE PRACTITIONER

## 2022-02-23 PROCEDURE — G8752 SYS BP LESS 140: HCPCS | Performed by: NURSE PRACTITIONER

## 2022-02-23 PROCEDURE — G8432 DEP SCR NOT DOC, RNG: HCPCS | Performed by: NURSE PRACTITIONER

## 2022-02-23 PROCEDURE — 99214 OFFICE O/P EST MOD 30 MIN: CPT | Performed by: NURSE PRACTITIONER

## 2022-02-23 PROCEDURE — G8427 DOCREV CUR MEDS BY ELIG CLIN: HCPCS | Performed by: NURSE PRACTITIONER

## 2022-02-23 PROCEDURE — G8420 CALC BMI NORM PARAMETERS: HCPCS | Performed by: NURSE PRACTITIONER

## 2022-02-23 PROCEDURE — 1101F PT FALLS ASSESS-DOCD LE1/YR: CPT | Performed by: NURSE PRACTITIONER

## 2022-02-23 RX ORDER — HYDROCHLOROTHIAZIDE 50 MG/1
50 TABLET ORAL DAILY
Qty: 90 TABLET | Refills: 1 | Status: SHIPPED | OUTPATIENT
Start: 2022-02-23 | End: 2022-08-26 | Stop reason: DRUGHIGH

## 2022-02-23 RX ORDER — LISINOPRIL 40 MG/1
TABLET ORAL
Qty: 90 TABLET | Refills: 1 | Status: SHIPPED | OUTPATIENT
Start: 2022-02-23 | End: 2022-08-26 | Stop reason: SDUPTHER

## 2022-02-23 RX ORDER — FERROUS FUMARATE 324(106)MG
TABLET ORAL
Qty: 180 TABLET | Refills: 0 | Status: SHIPPED | OUTPATIENT
Start: 2022-02-23 | End: 2022-06-20

## 2022-02-23 RX ORDER — PIOGLITAZONEHYDROCHLORIDE 15 MG/1
15 TABLET ORAL DAILY
Qty: 90 TABLET | Refills: 1 | Status: SHIPPED | OUTPATIENT
Start: 2022-02-23 | End: 2022-08-26 | Stop reason: SDUPTHER

## 2022-02-23 RX ORDER — SIMVASTATIN 40 MG/1
TABLET, FILM COATED ORAL
Qty: 90 TABLET | Refills: 1 | Status: SHIPPED | OUTPATIENT
Start: 2022-02-23 | End: 2022-08-26 | Stop reason: SDUPTHER

## 2022-02-23 RX ORDER — METFORMIN HYDROCHLORIDE 1000 MG/1
1000 TABLET ORAL 2 TIMES DAILY WITH MEALS
Qty: 180 TABLET | Refills: 2 | Status: SHIPPED | OUTPATIENT
Start: 2022-02-23 | End: 2022-08-26 | Stop reason: SDUPTHER

## 2022-02-23 NOTE — PROGRESS NOTES
Poly               [address]               [phone]      Andrae Gallegos is a 80 y.o. male and presents with Follow Up Chronic Condition, Cholesterol Problem, Hypertension, Diabetes, and Annual Wellness Visit       Assessment/Plan:    Diagnoses and all orders for this visit:    1. Medicare annual wellness visit, subsequent  Completed today to include ETOH and depression screening and Healthcare decision maker verified   2. Type 2 diabetes mellitus with microalbuminuria, without long-term current use of insulin ([facility])  Assessment & Plan:       Orders:  -     HEMOGLOBIN A1C WITH EAG; Future  -     MICROALBUMIN, UR, RAND W/ MICROALB/CREAT RATIO; Future  Endorses medication compliance, Follow up labs prior to next visit, Denies signs and symptoms of hyper or hypoglycemia and Diabetes controlled, A1C <7   Decrease actos to 15mg a day, A1C 5.5%   3. Essential hypertension with goal blood pressure less than 130/80  -     hydroCHLOROthiazide (HYDRODIURIL) 50 mg tablet; Take 1 Tablet by mouth daily.  -     METABOLIC PANEL, COMPREHENSIVE; Future  Endorses medication complaince  Blood pressure stable  Labs prior to next visit  Refills provided  4. Diabetic retinopathy associated with type 2 diabetes mellitus, macular edema presence unspecified, unspecified laterality, unspecified retinopathy severity ([address]  Assessment & Plan:   monitored by specialist. No acute findings meriting change in the plan      5. Iron deficiency anemia, unspecified iron deficiency anemia type  -     ferrous fumarate (Ferrocite) 324 mg (106 mg iron) tab; TAKE 1 TABLET BY MOUTH TWICE DAILY  -     CBC W/O DIFF; Future  Refills provided  Follow up labs prior to next visit  6. Mixed hyperlipidemia  -     simvastatin (ZOCOR) 40 mg tablet; TAKE 1 TABLET BY MOUTH EVERY NIGHT  -     LIPID PANEL; Future  Refills provided  Follow up labs prior to next visit  Endorses medication compliance  Denies myalgias or abd pain  7. Controlled type 2 diabetes mellitus without complication, without long-term current use of insulin (HCC)  -     metFORMIN (GLUCOPHAGE) 1,000 mg tablet; Take 1 Tablet by mouth two (2) times daily (with meals). -     pioglitazone (ACTOS) 15 mg tablet; Take 1 Tablet by mouth daily. Refills provided  8. Essential hypertension  -     lisinopriL (PRINIVIL, ZESTRIL) 40 mg tablet; TAKE 1 TABLET BY MOUTH DAILY  Refills provided  9. Decreased hearing of both ears  -     REFERRAL TO AUDIOLOGY  During todays MAWV he endorses decreased hearing and desire for futher testing  Referral placed    Follow-up and Dispositions    · Return in about 4 months (around 6/23/2022) for DM, HTN, HLD, 15 min, office only, with, labs prior. Health Maintenance:   Health Maintenance   Topic Date Due    Shingrix Vaccine Age 49> (1 of 2) Never done    Pneumococcal 65+ years (2 of 2 - PPSV23) 09/27/2019    Foot Exam Q1  01/13/2022    MICROALBUMIN Q1  07/26/2022    Lipid Screen  11/23/2022    Depression Screen  02/23/2023    Medicare Yearly Exam  02/24/2023    Eye Exam Retinal or Dilated  05/21/2023    DTaP/Tdap/Td series (2 - Td or Tdap) 10/15/2025    Flu Vaccine  Completed    COVID-19 Vaccine  Completed        Subjective:    Labs obtained prior to visit? NO  Reviewed with patient? Not applicable    DMII-   Patient reports medication compliance Daily  Diabetic diet compliance most of the time  Patient monitors blood sugars regularly once a week   Reports am fasting sugars range today was 87   Denies hypoglycemic episodes yes  Denies polyuria, polydipsia, paraesthesia, vision changes? yes  Engaging in daily exercise?  Yes: Comment: active in the house     Diabetic Foot and Eye Exam HM Status   Topic Date Due    Diabetic Foot Care  01/13/2022    Eye Exam  05/21/2023     Hemoglobin A1c   Date Value Ref Range Status   11/23/2021 5.5 4.2 - 5.6 % Final     Comment:     (NOTE)  HbA1C Interpretive Ranges  <5.7              Normal  5.7 - 6.4         Consider Prediabetes  >6.5              Consider Diabetes     ]  Creatinine, urine   Date Value Ref Range Status   2021 84.00 30 - 125 mg/dL Final     Microalbumin/Creat ratio (mg/g creat)   Date Value Ref Range Status   2021 83 (H) 0 - 30 mg/g Final   2021 150 (H) 0 - 30 mg/g Final   2018 150 (H) 0 - 30 mg/g Final     Microalb/Creat ratio (ug/mg creat.)   Date Value Ref Range Status   09/10/2019 95.1 (H) 0.0 - 30.0 mg/g creat Final     Comment:                          Normal:                0.0 -  30.0                       Albuminuria:          31.0 - 300.0                       Clinical albuminuria:       >300.0       Key Antihyperglycemic Medications             metFORMIN (GLUCOPHAGE) 1,000 mg tablet (Taking) Take 1 Tablet by mouth two (2) times daily (with meals). pioglitazone (ACTOS) 15 mg tablet (Taking) Take 1 Tablet by mouth daily. Hypertension:  Visit Vitals  /64   Pulse 76   Temp 98.2 °F (36.8 °C) (Temporal)   Resp 18   Ht 5' 3\" (1.6 m)   Wt 140 lb (63.5 kg)   SpO2 99%   BMI 24.80 kg/m²      Patient reports taking medications as instructed. yes   Medication side effects noted. no  Headache upon wakening. no   Home BP monitorin/60  Do you experience chest pain/pressure or SOB with exertion? no  Maintain a low Sodium diet? yes  Key CAD CHF Meds             simvastatin (ZOCOR) 40 mg tablet (Taking) TAKE 1 TABLET BY MOUTH EVERY NIGHT    hydroCHLOROthiazide (HYDRODIURIL) 50 mg tablet (Taking) Take 1 Tablet by mouth daily.     lisinopriL (PRINIVIL, ZESTRIL) 40 mg tablet (Taking) TAKE 1 TABLET BY MOUTH DAILY    aspirin delayed-release 81 mg tablet (Taking) TAKE 1 TABLET BY MOUTH ONCE DAILY        BUN   Date Value Ref Range Status   2021 37 (H) 7.0 - 18 MG/DL Final     Creatinine   Date Value Ref Range Status   2021 1.05 0.6 - 1.3 MG/DL Final     GFR est AA   Date Value Ref Range Status   2021 >60 >60 ml/min/1.73m2 Final     Potassium Date Value Ref Range Status   11/23/2021 5.4 3.5 - 5.5 mmol/L Final       HLD:  Has been compliant with meds  Yes  Compliant with low-fat diet. most of the time    Denies myalgias or other side effects. yes  The ASCVD Risk score (Vidhya Gibbons, et al., 2013) failed to calculate for the following reasons: The 2013 ASCVD risk score is only valid for ages 36 to 78    Cholesterol, total   Date Value Ref Range Status   11/23/2021 181 <200 MG/DL Final     Triglyceride   Date Value Ref Range Status   11/23/2021 78 <150 MG/DL Final     Comment:     The drugs N-acetylcysteine (NAC) and  Metamiszole have been found to cause falsely  low results in this chemical assay. Please  be sure to submit blood samples obtained  BEFORE administration of either of these  drugs to assure correct results. HDL Cholesterol   Date Value Ref Range Status   11/23/2021 84 (H) 40 - 60 MG/DL Final     LDL, calculated   Date Value Ref Range Status   11/23/2021 81.4 0 - 100 MG/DL Final   ]  Key Antihyperlipidemia Meds             simvastatin (ZOCOR) 40 mg tablet (Taking) TAKE 1 TABLET BY MOUTH EVERY NIGHT           ROS:     ROS  As stated in HPI, otherwise all others negative. The problem list was updated as a part of today's visit. 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 SH:  Social History     Tobacco Use    Smoking status: Never Smoker    Smokeless tobacco: Never Used   Vaping Use    Vaping Use: Never used   Substance Use Topics    Alcohol use: No    Drug use:  No Medications/Allergies:  Current Outpatient Medications on File Prior to Visit   Medication Sig Dispense Refill    glucose blood VI test strips (Embrace PRO test strips) strip Use to check blood sugar daily 50 Strip 5    aspirin delayed-release 81 mg tablet TAKE 1 TABLET BY MOUTH ONCE DAILY 90 Tab 5    cyanocobalamin (VITAMIN B-12) 1,000 mcg tablet Take 1 Tab by mouth daily. 90 Tab 0     No current facility-administered medications on file prior to visit. No Known Allergies    Objective:  Visit Vitals  /64   Pulse 76   Temp 98.2 °F (36.8 °C) (Temporal)   Resp 18   Ht 5' 3\" (1.6 m)   Wt 140 lb (63.5 kg)   SpO2 99%   BMI 24.80 kg/m²    Body mass index is 24.8 kg/m². Physical assessment  Physical Exam      Labwork and Ancillary Studies:    CBC w/Diff  Lab Results   Component Value Date/Time    WBC 5.1 07/26/2021 09:58 AM    HGB 9.9 (L) 07/26/2021 09:58 AM    PLATELET 042 21/63/1880 09:58 AM         Basic Metabolic Profile  Lab Results   Component Value Date/Time    Sodium 141 11/23/2021 01:03 PM    Potassium 5.4 11/23/2021 01:03 PM    Chloride 107 11/23/2021 01:03 PM    CO2 29 11/23/2021 01:03 PM    Anion gap 5 11/23/2021 01:03 PM    Glucose 74 11/23/2021 01:03 PM    BUN 37 (H) 11/23/2021 01:03 PM    Creatinine 1.05 11/23/2021 01:03 PM    BUN/Creatinine ratio 35 (H) 11/23/2021 01:03 PM    GFR est AA >60 11/23/2021 01:03 PM    GFR est non-AA >60 11/23/2021 01:03 PM    Calcium 9.7 11/23/2021 01:03 PM        Cholesterol  Lab Results   Component Value Date/Time    Cholesterol, total 181 11/23/2021 01:03 PM    HDL Cholesterol 84 (H) 11/23/2021 01:03 PM    LDL, calculated 81.4 11/23/2021 01:03 PM    Triglyceride 78 11/23/2021 01:03 PM    CHOL/HDL Ratio 2.2 11/23/2021 01:03 PM           I have discussed the diagnosis with the patient and the intended plan as seen in the above orders. The patient has received an After-Visit Summary and questions were answered concerning future plans.      An After Visit Summary was printed and given to the patient. All diagnosis have been discussed with the patient and all of the patient's questions have been answered. Follow-up and Dispositions    · Return in about 4 months (around 6/23/2022) for DM, HTN, HLD, 15 min, office only, with, labs prior. Harman Boast, IDA-BC  [facility]   [address] [place] [place] [address]  This is the Subsequent Medicare Annual Wellness Exam, performed 12 months or more after the Initial AWV or the last Subsequent AWV    I have reviewed the patient's medical history in detail and updated the computerized patient record. Assessment/Plan   Education and counseling provided:  Are appropriate based on today's review and evaluation    1. Medicare annual wellness visit, subsequent  2. Type 2 diabetes mellitus with microalbuminuria, without long-term current use of insulin (HCC)  Assessment & Plan:     Orders:  -     HEMOGLOBIN A1C WITH EAG; Future  -     MICROALBUMIN, UR, RAND W/ MICROALB/CREAT RATIO; Future  3. Essential hypertension with goal blood pressure less than 130/80  -     hydroCHLOROthiazide (HYDRODIURIL) 50 mg tablet; Take 1 Tablet by mouth daily. , Normal, Disp-90 Tablet, R-1  -     METABOLIC PANEL, COMPREHENSIVE; Future  4. Diabetic retinopathy associated with type 2 diabetes mellitus, macular edema presence unspecified, unspecified laterality, unspecified retinopathy severity ([address]  Assessment & Plan:   monitored by specialist. No acute findings meriting change in the plan  5. Iron deficiency anemia, unspecified iron deficiency anemia type  -     ferrous fumarate (Ferrocite) 324 mg (106 mg iron) tab; TAKE 1 TABLET BY MOUTH TWICE DAILY, Normal, Disp-180 Tablet, R-0  -     CBC W/O DIFF; Future  6.  Mixed hyperlipidemia  -     simvastatin (ZOCOR) 40 mg tablet; TAKE 1 TABLET BY MOUTH EVERY NIGHT, Normal, Disp-90 Tablet, R-1  -     LIPID PANEL; Future  7. Controlled type 2 diabetes mellitus without complication, without long-term current use of insulin (HCC)  -     metFORMIN (GLUCOPHAGE) 1,000 mg tablet; Take 1 Tablet by mouth two (2) times daily (with meals). , Normal, Disp-180 Tablet, R-2  -     pioglitazone (ACTOS) 15 mg tablet; Take 1 Tablet by mouth daily. , Normal, Disp-90 Tablet, R-1  8. Essential hypertension  -     lisinopriL (PRINIVIL, ZESTRIL) 40 mg tablet; TAKE 1 TABLET BY MOUTH DAILY, Normal, Disp-90 Tablet, R-1  9. Decreased hearing of both ears  -     REFERRAL TO AUDIOLOGY       Depression Risk Factor Screening     3 most recent PHQ Screens 2/23/2022   Little interest or pleasure in doing things Not at all   Feeling down, depressed, irritable, or hopeless Not at all   Total Score PHQ 2 0       Alcohol & Drug Abuse Risk Screen    Do you average more than 1 drink per night or more than 7 drinks a week: No    In the past three months have you have had more than 4 drinks containing alcohol on one occasion: No          Functional Ability and Level of Safety    Hearing: The patient needs further evaluation. Activities of Daily Living: The home contains: no safety equipment. Patient does total self care      Ambulation: with no difficulty     Fall Risk:  Fall Risk Assessment, last 12 mths 2/23/2022   Able to walk? Yes   Fall in past 12 months? 0   Do you feel unsteady? 0   Are you worried about falling 0   Is TUG test greater than 12 seconds? 0   Is the gait abnormal? 0   Number of falls in past 12 months 0   Fall with injury?  0      Abuse Screen:  Patient is not abused       Cognitive Screening    Has your family/caregiver stated any concerns about your memory: no     Cognitive Screening: Normal - Clock Drawing Test    Health Maintenance Due     Health Maintenance Due   Topic Date Due    Shingrix Vaccine Age 49> (1 of 2) Never done    Pneumococcal 65+ years (2 of 2 - PPSV23) 09/27/2019    Foot Exam Q1  01/13/2022       Patient Care Team Patient Care Team:  David Tony NP as PCP - General (Nurse Practitioner)  David Tony NP as PCP - [facility] Empaneled Provider    History     Patient Active Problem List   Diagnosis Code    Type 2 diabetes mellitus with microalbuminuria, without long-term current use of insulin ([facility] E11.29, R80.9    Essential hypertension with goal blood pressure less than 130/80 I10    Microalbuminuria R80.9    Hyperlipidemia E78.5    Iron deficiency anemia D50.9    Mild tricuspid regurgitation I07.1    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    Diabetic retinopathy associated with type 2 diabetes mellitus ([facility] E11.319     Past Medical History:   Diagnosis Date    Cataract     Diabetes mellitus, type II ([facility]     Iron deficiency anemia     Tuberculosis       Past Surgical History:   Procedure Laterality Date    HX BELOW KNEE AMPUTATION Right     HX CATARACT REMOVAL      HX ORTHOPAEDIC      HX PROSTATECTOMY      HX REFRACTIVE SURGERY  2009     Current Outpatient Medications   Medication Sig Dispense Refill    ferrous fumarate (Ferrocite) 324 mg (106 mg iron) tab TAKE 1 TABLET BY MOUTH TWICE DAILY 180 Tablet 0    simvastatin (ZOCOR) 40 mg tablet TAKE 1 TABLET BY MOUTH EVERY NIGHT 90 Tablet 1    metFORMIN (GLUCOPHAGE) 1,000 mg tablet Take 1 Tablet by mouth two (2) times daily (with meals). 180 Tablet 2    pioglitazone (ACTOS) 15 mg tablet Take 1 Tablet by mouth daily. 90 Tablet 1    hydroCHLOROthiazide (HYDRODIURIL) 50 mg tablet Take 1 Tablet by mouth daily. 90 Tablet 1    lisinopriL (PRINIVIL, ZESTRIL) 40 mg tablet TAKE 1 TABLET BY MOUTH DAILY 90 Tablet 1    glucose blood VI test strips (Embrace PRO test strips) strip Use to check blood sugar daily 50 Strip 5    aspirin delayed-release 81 mg tablet TAKE 1 TABLET BY MOUTH ONCE DAILY 90 Tab 5    cyanocobalamin (VITAMIN B-12) 1,000 mcg tablet Take 1 Tab by mouth daily.  90 Tab 0     No Known Allergies    Family History   Problem Relation Age of Onset    No Known Problems Mother     Alcohol abuse Father      Social History     Tobacco Use    Smoking status: Never Smoker    Smokeless tobacco: Never Used   Substance Use Topics    Alcohol use: No         Chencho Waller NP

## 2022-02-23 NOTE — PROGRESS NOTES
Room 8    Did patient bring someone? Yes: Comment: Son    Did the patient have DME equipment? N0    Did you take your medication today? Yes       1. \"Have you been to the ER, urgent care clinic since your last visit? Hospitalized since your last visit? \" No    2. \"Have you seen or consulted any other health care providers outside of the [address] [place] [address] since your last visit? \" No     3. For patients aged 39-70: Has the patient had a colonoscopy / FIT/ Cologuard? No      If the patient is female:    4. For patients aged 41-77: Has the patient had a mammogram within the past 2 years? NA - based on age or sex      11. For patients aged 21-65: Has the patient had a pap smear?  NA - based on age or sex        3 most recent PHQ Screens 2/23/2022   Little interest or pleasure in doing things Not at all   Feeling down, depressed, irritable, or hopeless Not at all   Total Score PHQ 2 0         Health Maintenance Due   Topic Date Due    Shingrix Vaccine Age 49> (1 of 2) Never done    Pneumococcal 65+ years (2 of 2 - PPSV23) 09/27/2019    Medicare Yearly Exam  12/01/2021    Foot Exam Q1  01/13/2022       Learning Assessment 11/23/2021   PRIMARY LEARNER Patient   HIGHEST LEVEL OF EDUCATION - PRIMARY LEARNER  -   PRIMARY LANGUAGE Japanese   LEARNER PREFERENCE PRIMARY VIDEOS   ANSWERED BY Myles CHAVARRIA SELF

## 2022-02-23 NOTE — PATIENT INSTRUCTIONS
Medicare Wellness Visit, Male    The best way to live healthy is to have a lifestyle where you eat a well-balanced diet, exercise regularly, limit alcohol use, and quit all forms of tobacco/nicotine, if applicable. Regular preventive services are another way to keep healthy. Preventive services (vaccines, screening tests, monitoring & exams) can help personalize your care plan, which helps you manage your own care. Screening tests can find health problems at the earliest stages, when they are easiest to treat. [facility] follows the current, evidence-based guidelines published by the [facility] Adi Theodore ([facility]) when recommending preventive services for our patients. Because we follow these guidelines, sometimes recommendations change over time as research supports it. (For example, a prostate screening blood test is no longer routinely recommended for men with no symptoms). Of course, you and your doctor may decide to screen more often for some diseases, based on your risk and co-morbidities (chronic disease you are already diagnosed with). Preventive services for you include:  - Medicare offers their members a free annual wellness visit, which is time for you and your primary care provider to discuss and plan for your preventive service needs. Take advantage of this benefit every year!  -All adults over age 72 should receive the recommended pneumonia vaccines. Current USPSTF guidelines recommend a series of two vaccines for the best pneumonia protection.   -All adults should have a flu vaccine yearly and tetanus vaccine every 10 years.  -All adults age 48 and older should receive the shingles vaccines (series of two vaccines).        -All adults age 38-68 who are overweight should have a diabetes screening test once every three years.   -Other screening tests & preventive services for persons with diabetes include: an eye exam to screen for diabetic retinopathy, a kidney function test, a foot exam, and stricter control over your cholesterol.   -Cardiovascular screening for adults with routine risk involves an electrocardiogram (ECG) at intervals determined by the provider.   -Colorectal cancer screening should be done for adults age 54-65 with no increased risk factors for colorectal cancer. There are a number of acceptable methods of screening for this type of cancer. Each test has its own benefits and drawbacks. Discuss with your provider what is most appropriate for you during your annual wellness visit. The different tests include: colonoscopy (considered the best screening method), a fecal occult blood test, a fecal DNA test, and sigmoidoscopy.  -All adults born between [facility] should be screened once for Hepatitis C.  -An Abdominal Aortic Aneurysm (AAA) Screening is recommended for men age 73-68 who has ever smoked in their lifetime.      Here is a list of your current Health Maintenance items (your personalized list of preventive services) with a due date:  Health Maintenance Due   Topic Date Due    Shingles Vaccine (1 of 2) Never done    Pneumococcal Vaccine (2 of 2 - PPSV23) 09/27/2019    Diabetic Foot Care  01/13/2022

## 2022-03-18 PROBLEM — E11.319 DIABETIC RETINOPATHY ASSOCIATED WITH TYPE 2 DIABETES MELLITUS (HCC): Status: ACTIVE | Noted: 2021-11-23

## 2022-03-19 PROBLEM — D50.9 IRON DEFICIENCY ANEMIA: Status: ACTIVE | Noted: 2020-09-21

## 2022-03-19 PROBLEM — I07.1 MILD TRICUSPID REGURGITATION: Status: ACTIVE | Noted: 2017-12-02

## 2022-03-19 PROBLEM — Z97.10 EMPLOYS PROSTHETIC LEG: Status: ACTIVE | Noted: 2021-11-23

## 2022-03-19 PROBLEM — R80.9 MICROALBUMINURIA: Status: ACTIVE | Noted: 2018-09-27

## 2022-03-19 PROBLEM — E78.5 HYPERLIPIDEMIA: Status: ACTIVE | Noted: 2019-06-28

## 2022-03-19 PROBLEM — I10 ESSENTIAL HYPERTENSION WITH GOAL BLOOD PRESSURE LESS THAN 130/80: Status: ACTIVE | Noted: 2018-09-27

## 2022-03-20 PROBLEM — E11.29 TYPE 2 DIABETES MELLITUS WITH MICROALBUMINURIA, WITHOUT LONG-TERM CURRENT USE OF INSULIN (HCC): Status: ACTIVE | Noted: 2018-09-27

## 2022-03-20 PROBLEM — R80.9 TYPE 2 DIABETES MELLITUS WITH MICROALBUMINURIA, WITHOUT LONG-TERM CURRENT USE OF INSULIN (HCC): Status: ACTIVE | Noted: 2018-09-27

## 2022-06-17 ENCOUNTER — HOSPITAL ENCOUNTER (OUTPATIENT)
Dept: LAB | Age: 82
Discharge: HOME | End: 2022-06-17
Payer: MEDICARE

## 2022-06-17 DIAGNOSIS — D50.9 IRON DEFICIENCY ANEMIA, UNSPECIFIED IRON DEFICIENCY ANEMIA TYPE: ICD-10-CM

## 2022-06-17 LAB
ALBUMIN SERPL-MCNC: 3.7 G/DL (ref 3.4–5)
ALBUMIN/GLOB SERPL: 1.2 {RATIO} (ref 0.8–1.7)
ALP SERPL-CCNC: 85 U/L (ref 45–117)
ALT SERPL-CCNC: 15 U/L (ref 16–61)
ANION GAP SERPL CALC-SCNC: 5 MMOL/L (ref 3–18)
AST SERPL-CCNC: 14 U/L (ref 10–38)
BILIRUB SERPL-MCNC: 0.3 MG/DL (ref 0.2–1)
BUN SERPL-MCNC: 36 MG/DL (ref 7–18)
BUN/CREAT SERPL: 33 (ref 12–20)
CALCIUM SERPL-MCNC: 9.5 MG/DL (ref 8.5–10.1)
CHLORIDE SERPL-SCNC: 110 MMOL/L (ref 100–111)
CHOLEST SERPL-MCNC: 160 MG/DL
CO2 SERPL-SCNC: 27 MMOL/L (ref 21–32)
CREAT SERPL-MCNC: 1.08 MG/DL (ref 0.6–1.3)
CREAT UR-MCNC: 75 MG/DL (ref 30–125)
ERYTHROCYTE [DISTWIDTH] IN BLOOD BY AUTOMATED COUNT: 13.2 % (ref 11.6–14.5)
EST. AVERAGE GLUCOSE BLD GHB EST-MCNC: 151 MG/DL
GLOBULIN SER CALC-MCNC: 3.2 G/DL (ref 2–4)
GLUCOSE SERPL-MCNC: 99 MG/DL (ref 74–99)
HBA1C MFR BLD: 6.9 % (ref 4.2–5.6)
HCT VFR BLD AUTO: 30.7 % (ref 36–48)
HDLC SERPL-MCNC: 77 MG/DL (ref 40–60)
HDLC SERPL: 2.1 {RATIO} (ref 0–5)
HGB BLD-MCNC: 9.8 G/DL (ref 13–16)
LDLC SERPL CALC-MCNC: 66.2 MG/DL (ref 0–100)
LIPID PROFILE,FLP: ABNORMAL
MCH RBC QN AUTO: 32.5 PG (ref 24–34)
MCHC RBC AUTO-ENTMCNC: 31.9 G/DL (ref 31–37)
MCV RBC AUTO: 101.7 FL (ref 78–100)
MICROALBUMIN UR-MCNC: 5.46 MG/DL (ref 0–3)
MICROALBUMIN/CREAT UR-RTO: 73 MG/G (ref 0–30)
NRBC # BLD: 0 K/UL (ref 0–0.01)
NRBC BLD-RTO: 0 PER 100 WBC
PLATELET # BLD AUTO: 216 K/UL (ref 135–420)
PMV BLD AUTO: 10.6 FL (ref 9.2–11.8)
POTASSIUM SERPL-SCNC: 5.2 MMOL/L (ref 3.5–5.5)
PROT SERPL-MCNC: 6.9 G/DL (ref 6.4–8.2)
RBC # BLD AUTO: 3.02 M/UL (ref 4.35–5.65)
SODIUM SERPL-SCNC: 142 MMOL/L (ref 136–145)
TRIGL SERPL-MCNC: 84 MG/DL (ref ?–150)
VLDLC SERPL CALC-MCNC: 16.8 MG/DL
WBC # BLD AUTO: 4.8 K/UL (ref 4.6–13.2)

## 2022-06-17 PROCEDURE — 80053 COMPREHEN METABOLIC PANEL: CPT

## 2022-06-17 PROCEDURE — 83036 HEMOGLOBIN GLYCOSYLATED A1C: CPT

## 2022-06-17 PROCEDURE — 80061 LIPID PANEL: CPT

## 2022-06-17 PROCEDURE — 85027 COMPLETE CBC AUTOMATED: CPT

## 2022-06-17 PROCEDURE — 82043 UR ALBUMIN QUANTITATIVE: CPT

## 2022-06-20 RX ORDER — FERROUS FUMARATE 324(106)MG
TABLET ORAL
Qty: 180 TABLET | Refills: 0 | Status: SHIPPED | OUTPATIENT
Start: 2022-06-20 | End: 2022-09-22

## 2022-08-22 PROBLEM — Z89.511 S/P BKA (BELOW KNEE AMPUTATION), RIGHT (HCC): Status: ACTIVE | Noted: 2022-08-22

## 2022-08-26 ENCOUNTER — OFFICE VISIT (OUTPATIENT)
Dept: FAMILY MEDICINE CLINIC | Age: 82
End: 2022-08-26
Payer: MEDICARE

## 2022-08-26 VITALS
DIASTOLIC BLOOD PRESSURE: 42 MMHG | OXYGEN SATURATION: 97 % | SYSTOLIC BLOOD PRESSURE: 102 MMHG | WEIGHT: 137 LBS | HEART RATE: 63 BPM | TEMPERATURE: 98.3 F | HEIGHT: 63 IN | RESPIRATION RATE: 18 BRPM | BODY MASS INDEX: 24.27 KG/M2

## 2022-08-26 DIAGNOSIS — I10 ESSENTIAL HYPERTENSION: ICD-10-CM

## 2022-08-26 DIAGNOSIS — G89.29 CHRONIC PAIN OF BOTH SHOULDERS: ICD-10-CM

## 2022-08-26 DIAGNOSIS — M25.512 CHRONIC PAIN OF BOTH SHOULDERS: ICD-10-CM

## 2022-08-26 DIAGNOSIS — E11.29 TYPE 2 DIABETES MELLITUS WITH MICROALBUMINURIA, WITHOUT LONG-TERM CURRENT USE OF INSULIN (HCC): Primary | ICD-10-CM

## 2022-08-26 DIAGNOSIS — Z89.511 S/P BKA (BELOW KNEE AMPUTATION), RIGHT (HCC): ICD-10-CM

## 2022-08-26 DIAGNOSIS — M25.511 CHRONIC PAIN OF BOTH SHOULDERS: ICD-10-CM

## 2022-08-26 DIAGNOSIS — E78.2 MIXED HYPERLIPIDEMIA: ICD-10-CM

## 2022-08-26 DIAGNOSIS — R80.9 TYPE 2 DIABETES MELLITUS WITH MICROALBUMINURIA, WITHOUT LONG-TERM CURRENT USE OF INSULIN (HCC): Primary | ICD-10-CM

## 2022-08-26 PROCEDURE — G8427 DOCREV CUR MEDS BY ELIG CLIN: HCPCS | Performed by: NURSE PRACTITIONER

## 2022-08-26 PROCEDURE — G8536 NO DOC ELDER MAL SCRN: HCPCS | Performed by: NURSE PRACTITIONER

## 2022-08-26 PROCEDURE — G8752 SYS BP LESS 140: HCPCS | Performed by: NURSE PRACTITIONER

## 2022-08-26 PROCEDURE — 3044F HG A1C LEVEL LT 7.0%: CPT | Performed by: NURSE PRACTITIONER

## 2022-08-26 PROCEDURE — 1101F PT FALLS ASSESS-DOCD LE1/YR: CPT | Performed by: NURSE PRACTITIONER

## 2022-08-26 PROCEDURE — G8420 CALC BMI NORM PARAMETERS: HCPCS | Performed by: NURSE PRACTITIONER

## 2022-08-26 PROCEDURE — 1123F ACP DISCUSS/DSCN MKR DOCD: CPT | Performed by: NURSE PRACTITIONER

## 2022-08-26 PROCEDURE — G8432 DEP SCR NOT DOC, RNG: HCPCS | Performed by: NURSE PRACTITIONER

## 2022-08-26 PROCEDURE — G8754 DIAS BP LESS 90: HCPCS | Performed by: NURSE PRACTITIONER

## 2022-08-26 PROCEDURE — 99214 OFFICE O/P EST MOD 30 MIN: CPT | Performed by: NURSE PRACTITIONER

## 2022-08-26 RX ORDER — PIOGLITAZONEHYDROCHLORIDE 15 MG/1
15 TABLET ORAL DAILY
Qty: 90 TABLET | Refills: 1 | Status: SHIPPED | OUTPATIENT
Start: 2022-08-26

## 2022-08-26 RX ORDER — HYDROCHLOROTHIAZIDE 25 MG/1
25 TABLET ORAL DAILY
Qty: 90 TABLET | Refills: 3 | Status: SHIPPED | OUTPATIENT
Start: 2022-08-26

## 2022-08-26 RX ORDER — LISINOPRIL 40 MG/1
TABLET ORAL
Qty: 90 TABLET | Refills: 1 | Status: SHIPPED | OUTPATIENT
Start: 2022-08-26

## 2022-08-26 RX ORDER — SIMVASTATIN 40 MG/1
TABLET, FILM COATED ORAL
Qty: 90 TABLET | Refills: 1 | Status: SHIPPED | OUTPATIENT
Start: 2022-08-26

## 2022-08-26 RX ORDER — METFORMIN HYDROCHLORIDE 1000 MG/1
1000 TABLET ORAL 2 TIMES DAILY WITH MEALS
Qty: 180 TABLET | Refills: 2 | Status: SHIPPED | OUTPATIENT
Start: 2022-08-26

## 2022-08-26 NOTE — PROGRESS NOTES
Poly               [address]               [phone]      Leda Bustos is a 80 y.o. male and presents with Follow Up Chronic Condition, Cholesterol Problem, Hypertension, and Diabetes       Assessment/Plan:    Diagnoses and all orders for this visit:    1. Type 2 diabetes mellitus with microalbuminuria, without long-term current use of insulin (HCC)  -     metFORMIN (GLUCOPHAGE) 1,000 mg tablet; Take 1 Tablet by mouth two (2) times daily (with meals). -     pioglitazone (ACTOS) 15 mg tablet; Take 1 Tablet by mouth daily.  -     HEMOGLOBIN A1C WITH EAG; Future  Endorses medication compliance, Refill provided, Follow up labs prior to next visit, Denies signs and symptoms of hyper or hypoglycemia, and Diabetes controlled, A1C <7     2. Essential hypertension  -     lisinopriL (PRINIVIL, ZESTRIL) 40 mg tablet; TAKE 1 TABLET BY MOUTH DAILY  -     hydroCHLOROthiazide (HYDRODIURIL) 25 mg tablet; Take 1 Tablet by mouth daily.  -     METABOLIC PANEL, COMPREHENSIVE; Future  Endorses medication compliance, Refill provided, Follow up labs prior to next visit, and Blood pressure uncontrolled running low, decrease HCTZ from 50 to 25mg daily, follow up at next visit      3. Mixed hyperlipidemia  -     simvastatin (ZOCOR) 40 mg tablet; TAKE 1 TABLET BY MOUTH EVERY NIGHT  -     LIPID PANEL; Future  Endorses medication compliance, Refill provided, Follow up labs prior to next visit, and Denies abdominal pain or symptoms of myalgia     4. Chronic pain of both shoulders  Most likely arthritis, try home exercises, handout provided with AVS  If no help then refer to formal PT or ortho  Patient verbalized understanding and is in agreement with this plan of care    5.  S/P BKA (below knee amputation), right [facility])  Assessment & Plan:   well controlled, continue current treatment plan        Follow-up and Dispositions    Return in about 4 months (around 12/26/2022) for DM, DM foot, HLD, HTN, 30 min, office only, w/labs prior. Health Maintenance:   Health Maintenance   Topic Date Due    Shingrix Vaccine Age 49> (1 of 2) Never done    Pneumococcal 65+ years (2 - PPSV23 or PCV20) 09/27/2019    Foot Exam Q1  01/13/2022    COVID-19 Vaccine (4 - Booster for Pfizer series) 02/28/2022    Flu Vaccine (1) 09/01/2022    Depression Screen  02/23/2023    Medicare Yearly Exam  02/24/2023    MICROALBUMIN Q1  06/17/2023    Lipid Screen  06/17/2023    Eye Exam Retinal or Dilated  05/13/2024    DTaP/Tdap/Td series (2 - Td or Tdap) 10/15/2025        Subjective:    Labs obtained prior to visit? NO  Reviewed with patient? Not applicable    Shoulder discomfort  Happens when it is cold in the house  Relieved with activity or taking a hot shower  Denies color changes to hands      DMII-   Patient reports medication compliance Daily  Diabetic diet compliance most of the time  Patient monitors blood sugars regularly  once a week   Reports am fasting sugars range  105   Denies hypoglycemic episodes yes  Denies polyuria, polydipsia, paraesthesia, vision changes? yes  Engaging in daily exercise?  No     Diabetic Foot and Eye Exam HM Status   Topic Date Due    Diabetic Foot Care  01/13/2022    Eye Exam  05/13/2024     Hemoglobin A1c   Date Value Ref Range Status   06/17/2022 6.9 (H) 4.2 - 5.6 % Final     Comment:     (NOTE)  HbA1C Interpretive Ranges  <5.7              Normal  5.7 - 6.4         Consider Prediabetes  >6.5              Consider Diabetes     ]  Creatinine, urine   Date Value Ref Range Status   06/17/2022 75.00 30 - 125 mg/dL Final     Microalbumin/Creat ratio (mg/g creat)   Date Value Ref Range Status   06/17/2022 73 (H) 0 - 30 mg/g Final   07/26/2021 83 (H) 0 - 30 mg/g Final   03/25/2021 150 (H) 0 - 30 mg/g Final   09/04/2018 150 (H) 0 - 30 mg/g Final     Microalb/Creat ratio (ug/mg creat.)   Date Value Ref Range Status   09/10/2019 95.1 (H) 0.0 - 30.0 mg/g creat Final     Comment:                          Normal: 0.0 -  30.0                       Albuminuria:          31.0 - 300.0                       Clinical albuminuria:       >300.0       Key Antihyperglycemic Medications               metFORMIN (GLUCOPHAGE) 1,000 mg tablet (Taking) Take 1 Tablet by mouth two (2) times daily (with meals). pioglitazone (ACTOS) 15 mg tablet (Taking) Take 1 Tablet by mouth daily. Hypertension: decrease hctz to 25mg from 50 due to low diastolic  Visit Vitals  BP (!) 102/42   Pulse 63   Temp 98.3 °F (36.8 °C) (Temporal)   Resp 18   Ht 5' 3\" (1.6 m)   Wt 137 lb (62.1 kg)   SpO2 97%   BMI 24.27 kg/m²      Patient reports taking medications as instructed. yes   Medication side effects noted. no  Headache upon wakening. no   Home BP monitorin/54  Do you experience chest pain/pressure or SOB with exertion? no  Maintain a low Sodium diet? yes  Key CAD CHF Meds               lisinopriL (PRINIVIL, ZESTRIL) 40 mg tablet (Taking) TAKE 1 TABLET BY MOUTH DAILY    simvastatin (ZOCOR) 40 mg tablet (Taking) TAKE 1 TABLET BY MOUTH EVERY NIGHT    hydroCHLOROthiazide (HYDRODIURIL) 25 mg tablet (Taking) Take 1 Tablet by mouth daily. aspirin delayed-release 81 mg tablet (Taking) TAKE 1 TABLET BY MOUTH ONCE DAILY          BUN   Date Value Ref Range Status   2022 36 (H) 7.0 - 18 MG/DL Final     Creatinine   Date Value Ref Range Status   2022 1.08 0.6 - 1.3 MG/DL Final     GFR est AA   Date Value Ref Range Status   2022 >60 >60 ml/min/1.73m2 Final     Potassium   Date Value Ref Range Status   2022 5.2 3.5 - 5.5 mmol/L Final         HLD:  Has been compliant with meds  Yes  Compliant with low-fat diet. most of the time    Denies myalgias or other side effects. yes  The ASCVD Risk score (Sudha Urias., et al., 2013) failed to calculate for the following reasons:     The 2013 ASCVD risk score is only valid for ages 36 to 78    Cholesterol, total   Date Value Ref Range Status   2022 160 <200 MG/DL Final Triglyceride   Date Value Ref Range Status   06/17/2022 84 <150 MG/DL Final     Comment:     The drugs N-acetylcysteine (NAC) and  Metamiszole have been found to cause falsely  low results in this chemical assay. Please  be sure to submit blood samples obtained  BEFORE administration of either of these  drugs to assure correct results. HDL Cholesterol   Date Value Ref Range Status   06/17/2022 77 (H) 40 - 60 MG/DL Final     LDL, calculated   Date Value Ref Range Status   06/17/2022 66.2 0 - 100 MG/DL Final   ]  Key Antihyperlipidemia Meds               simvastatin (ZOCOR) 40 mg tablet (Taking) TAKE 1 TABLET BY MOUTH EVERY NIGHT               ROS:     ROS  As stated in HPI, otherwise all others negative. The problem list was updated as a part of today's visit. 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       SH:  Social History     Tobacco Use    Smoking status: Never    Smokeless tobacco: Never   Vaping Use    Vaping Use: Never used   Substance Use Topics    Alcohol use: No    Drug use: No       Medications/Allergies:  Current Outpatient Medications on File Prior to Visit   Medication Sig Dispense Refill    ferrous fumarate (Ferrocite) 324 mg (106 mg iron) tab TAKE 1 TABLET BY MOUTH TWICE DAILY 180 Tablet 0    glucose blood VI test strips (Embrace PRO test strips) strip Use to check blood sugar daily 50 Strip 5    aspirin delayed-release 81 mg tablet TAKE 1 TABLET BY MOUTH ONCE DAILY 90 Tab 5 cyanocobalamin (VITAMIN B-12) 1,000 mcg tablet Take 1 Tab by mouth daily. 90 Tab 0    [DISCONTINUED] simvastatin (ZOCOR) 40 mg tablet TAKE 1 TABLET BY MOUTH EVERY NIGHT 90 Tablet 1    [DISCONTINUED] metFORMIN (GLUCOPHAGE) 1,000 mg tablet Take 1 Tablet by mouth two (2) times daily (with meals). 180 Tablet 2    [DISCONTINUED] pioglitazone (ACTOS) 15 mg tablet Take 1 Tablet by mouth daily. 90 Tablet 1    [DISCONTINUED] hydroCHLOROthiazide (HYDRODIURIL) 50 mg tablet Take 1 Tablet by mouth daily. 90 Tablet 1    [DISCONTINUED] lisinopriL (PRINIVIL, ZESTRIL) 40 mg tablet TAKE 1 TABLET BY MOUTH DAILY 90 Tablet 1     No current facility-administered medications on file prior to visit. No Known Allergies    Objective:  Visit Vitals  BP (!) 102/42   Pulse 63   Temp 98.3 °F (36.8 °C) (Temporal)   Resp 18   Ht 5' 3\" (1.6 m)   Wt 137 lb (62.1 kg)   SpO2 97%   BMI 24.27 kg/m²    Body mass index is 24.27 kg/m². Physical assessment  Physical Exam  Vitals and nursing note reviewed. Eyes:      Conjunctiva/sclera: Conjunctivae normal.      Pupils: Pupils are equal, round, and reactive to light. Cardiovascular:      Rate and Rhythm: Normal rate and regular rhythm. Heart sounds: Normal heart sounds. No murmur heard. No friction rub. No gallop. Pulmonary:      Effort: Pulmonary effort is normal.      Breath sounds: Normal breath sounds. Musculoskeletal:         General: Normal range of motion. Cervical back: Normal range of motion. Skin:     General: Skin is warm and dry. Neurological:      Mental Status: He is alert.          Labwork and Ancillary Studies:    CBC w/Diff  Lab Results   Component Value Date/Time    WBC 4.8 06/17/2022 03:15 PM    HGB 9.8 (L) 06/17/2022 03:15 PM    PLATELET 077 57/38/4617 03:15 PM         Basic Metabolic Profile  Lab Results   Component Value Date/Time    Sodium 142 06/17/2022 03:15 PM    Potassium 5.2 06/17/2022 03:15 PM    Chloride 110 06/17/2022 03:15 PM    CO2 27 06/17/2022 03:15 PM    Anion gap 5 06/17/2022 03:15 PM    Glucose 99 06/17/2022 03:15 PM    BUN 36 (H) 06/17/2022 03:15 PM    Creatinine 1.08 06/17/2022 03:15 PM    BUN/Creatinine ratio 33 (H) 06/17/2022 03:15 PM    GFR est AA >60 06/17/2022 03:15 PM    GFR est non-AA >60 06/17/2022 03:15 PM    Calcium 9.5 06/17/2022 03:15 PM        Cholesterol  Lab Results   Component Value Date/Time    Cholesterol, total 160 06/17/2022 03:15 PM    HDL Cholesterol 77 (H) 06/17/2022 03:15 PM    LDL, calculated 66.2 06/17/2022 03:15 PM    Triglyceride 84 06/17/2022 03:15 PM    CHOL/HDL Ratio 2.1 06/17/2022 03:15 PM           I have discussed the diagnosis with the patient and the intended plan as seen in the above orders. The patient has received an After-Visit Summary and questions were answered concerning future plans. An After Visit Summary was printed and given to the patient. All diagnosis have been discussed with the patient and all of the patient's questions have been answered. Follow-up and Dispositions    Return in about 4 months (around 12/26/2022) for DM, DM foot, HLD, HTN, 30 min, office only, w/labs prior. Siobhan Cheatham, [facility]  [address]  [id number]

## 2022-08-26 NOTE — PROGRESS NOTES
Room 1     When asked if patient has any concerns he would like to address with YAHIR Mckeon patient states Yes I feel a little discomfort in y shoulder and arms when it is cold  . Did patient bring someone? Yes: Comment: Son     Did the patient have DME equipment? No     Did you take your medication today? Yes       1. \"Have you been to the ER, urgent care clinic since your last visit? Hospitalized since your last visit? \" No    2. \"Have you seen or consulted any other health care providers outside of the [address] [place] [address] since your last visit? \" No     3. For patients aged 39-70: Has the patient had a colonoscopy / FIT/ Cologuard? NA - based on age      If the patient is female:    4. For patients aged 41-77: Has the patient had a mammogram within the past 2 years? NA - based on age or sex      11. For patients aged 21-65: Has the patient had a pap smear?  NA - based on age or sex        3 most recent PHQ Screens 8/26/2022   Little interest or pleasure in doing things Not at all   Feeling down, depressed, irritable, or hopeless Not at all   Total Score PHQ 2 0         Health Maintenance Due   Topic Date Due    Shingrix Vaccine Age 49> (1 of 2) Never done    Pneumococcal 65+ years (2 - PPSV23 or PCV20) 09/27/2019    Foot Exam Q1  01/13/2022    COVID-19 Vaccine (4 - Booster for Pfizer series) 02/28/2022       Learning Assessment 11/23/2021   PRIMARY LEARNER Patient   HIGHEST LEVEL OF EDUCATION - PRIMARY LEARNER  -   PRIMARY LANGUAGE Peruvian   LEARNER PREFERENCE PRIMARY VIDEOS   ANSWERED BY Myles   RELATIONSHIP SELF

## 2023-01-20 ENCOUNTER — HOSPITAL ENCOUNTER (OUTPATIENT)
Dept: LAB | Age: 83
End: 2023-01-20
Payer: MEDICARE

## 2023-01-20 LAB
ALBUMIN SERPL-MCNC: 3.7 G/DL (ref 3.4–5)
ALBUMIN/GLOB SERPL: 1.2 (ref 0.8–1.7)
ALP SERPL-CCNC: 80 U/L (ref 45–117)
ALT SERPL-CCNC: 30 U/L (ref 16–61)
ANION GAP SERPL CALC-SCNC: 5 MMOL/L (ref 3–18)
AST SERPL-CCNC: 18 U/L (ref 10–38)
BILIRUB SERPL-MCNC: 0.3 MG/DL (ref 0.2–1)
BUN SERPL-MCNC: 29 MG/DL (ref 7–18)
BUN/CREAT SERPL: 30 (ref 12–20)
CALCIUM SERPL-MCNC: 9.2 MG/DL (ref 8.5–10.1)
CHLORIDE SERPL-SCNC: 108 MMOL/L (ref 100–111)
CHOLEST SERPL-MCNC: 151 MG/DL
CO2 SERPL-SCNC: 29 MMOL/L (ref 21–32)
CREAT SERPL-MCNC: 0.98 MG/DL (ref 0.6–1.3)
EST. AVERAGE GLUCOSE BLD GHB EST-MCNC: 163 MG/DL
GLOBULIN SER CALC-MCNC: 3.1 G/DL (ref 2–4)
GLUCOSE SERPL-MCNC: 104 MG/DL (ref 74–99)
HBA1C MFR BLD: 7.3 % (ref 4.2–5.6)
HDLC SERPL-MCNC: 77 MG/DL (ref 40–60)
HDLC SERPL: 2 (ref 0–5)
LDLC SERPL CALC-MCNC: 60.6 MG/DL (ref 0–100)
LIPID PROFILE,FLP: ABNORMAL
POTASSIUM SERPL-SCNC: 5.2 MMOL/L (ref 3.5–5.5)
PROT SERPL-MCNC: 6.8 G/DL (ref 6.4–8.2)
SODIUM SERPL-SCNC: 142 MMOL/L (ref 136–145)
TRIGL SERPL-MCNC: 67 MG/DL (ref ?–150)
VLDLC SERPL CALC-MCNC: 13.4 MG/DL

## 2023-01-20 PROCEDURE — 80061 LIPID PANEL: CPT

## 2023-01-20 PROCEDURE — 80053 COMPREHEN METABOLIC PANEL: CPT

## 2023-01-20 PROCEDURE — 36415 COLL VENOUS BLD VENIPUNCTURE: CPT

## 2023-01-20 PROCEDURE — 83036 HEMOGLOBIN GLYCOSYLATED A1C: CPT

## 2023-01-27 ENCOUNTER — OFFICE VISIT (OUTPATIENT)
Dept: FAMILY MEDICINE CLINIC | Age: 83
End: 2023-01-27
Payer: MEDICARE

## 2023-01-27 VITALS
DIASTOLIC BLOOD PRESSURE: 60 MMHG | BODY MASS INDEX: 24.45 KG/M2 | SYSTOLIC BLOOD PRESSURE: 142 MMHG | OXYGEN SATURATION: 99 % | WEIGHT: 138 LBS | TEMPERATURE: 98.2 F | HEART RATE: 64 BPM | RESPIRATION RATE: 18 BRPM | HEIGHT: 63 IN

## 2023-01-27 DIAGNOSIS — R04.0 BLEEDING FROM THE NOSE: ICD-10-CM

## 2023-01-27 DIAGNOSIS — Z89.511 S/P BKA (BELOW KNEE AMPUTATION), RIGHT (HCC): ICD-10-CM

## 2023-01-27 DIAGNOSIS — I10 ESSENTIAL HYPERTENSION WITH GOAL BLOOD PRESSURE LESS THAN 130/80: ICD-10-CM

## 2023-01-27 DIAGNOSIS — E11.319 DIABETIC RETINOPATHY ASSOCIATED WITH TYPE 2 DIABETES MELLITUS, MACULAR EDEMA PRESENCE UNSPECIFIED, UNSPECIFIED LATERALITY, UNSPECIFIED RETINOPATHY SEVERITY (HCC): Primary | ICD-10-CM

## 2023-01-27 DIAGNOSIS — E78.2 MIXED HYPERLIPIDEMIA: ICD-10-CM

## 2023-01-27 PROCEDURE — G8427 DOCREV CUR MEDS BY ELIG CLIN: HCPCS | Performed by: NURSE PRACTITIONER

## 2023-01-27 PROCEDURE — G8536 NO DOC ELDER MAL SCRN: HCPCS | Performed by: NURSE PRACTITIONER

## 2023-01-27 PROCEDURE — 99214 OFFICE O/P EST MOD 30 MIN: CPT | Performed by: NURSE PRACTITIONER

## 2023-01-27 PROCEDURE — 3051F HG A1C>EQUAL 7.0%<8.0%: CPT | Performed by: NURSE PRACTITIONER

## 2023-01-27 PROCEDURE — G8420 CALC BMI NORM PARAMETERS: HCPCS | Performed by: NURSE PRACTITIONER

## 2023-01-27 PROCEDURE — 1101F PT FALLS ASSESS-DOCD LE1/YR: CPT | Performed by: NURSE PRACTITIONER

## 2023-01-27 PROCEDURE — 3078F DIAST BP <80 MM HG: CPT | Performed by: NURSE PRACTITIONER

## 2023-01-27 PROCEDURE — 1123F ACP DISCUSS/DSCN MKR DOCD: CPT | Performed by: NURSE PRACTITIONER

## 2023-01-27 PROCEDURE — G8432 DEP SCR NOT DOC, RNG: HCPCS | Performed by: NURSE PRACTITIONER

## 2023-01-27 PROCEDURE — 3074F SYST BP LT 130 MM HG: CPT | Performed by: NURSE PRACTITIONER

## 2023-01-27 NOTE — PROGRESS NOTES
[address]               [phone]      Victor Hugo Ludwig is a 80 y.o. male and presents with Follow Up Chronic Condition, Cholesterol Problem, Hypertension, and Diabetes       Assessment/Plan:    Diagnoses and all orders for this visit:    1. Diabetic retinopathy associated with type 2 diabetes mellitus, macular edema presence unspecified, unspecified laterality, unspecified retinopathy severity ([address]  -     HEMOGLOBIN A1C WITH EAG; Future  -     MICROALBUMIN, UR, RAND W/ MICROALB/CREAT RATIO; Future  Endorses medication compliance, Follow up labs prior to next visit, Denies signs and symptoms of hyper or hypoglycemia, and Diabetes controlled, A1C <8  2. Essential hypertension with goal blood pressure less than 478/38  -     METABOLIC PANEL, COMPREHENSIVE; Future  Endorses medication compliance, Follow up labs prior to next visit, and Blood pressure stable, continue ramipril at same dose    3. Mixed hyperlipidemia  -     LIPID PANEL; Future  Endorses medication compliance, Follow up labs prior to next visit, and Denies abdominal pain or symptoms of myalgia   4. Bleeding from the nose  Declines ENT at this time advise to use saline spray and humidifier in his room  5. S/P BKA (below knee amputation), right [facility])  Assessment & Plan:   well controlled, continue current treatment plan        Follow-up and Dispositions    Return in about 3 months (around 4/27/2023) for MAWV, DM, HTN, HLD, 30 min, office only, w/labs prior.            Health Maintenance:   Health Maintenance   Topic Date Due    Shingles Vaccine (1 of 2) Never done    Pneumococcal 65+ years (2 - PPSV23 if available, else PCV20) 09/27/2019    COVID-19 Vaccine (4 - Booster for [place] series) 12/24/2021    Foot Exam Q1  01/13/2022    Medicare Yearly Exam  02/24/2023    Lipid Screen  01/20/2024    Depression Screen  01/27/2024    Eye Exam Retinal or Dilated  05/13/2024    DTaP/Tdap/Td series (2 - Td or Tdap) 10/15/2025    Flu Vaccine  Completed        Subjective:    Labs obtained prior to visit? YES  Reviewed with patient? Yes    Nose bleeds  Happen about two times a month  Onset: long standing, frequency has increased  Only happens on right nare, happens blowing his nose  Bleeding lasts about 1 minute, intermittent  Never had this looked into before      DMII-   Patient reports medication compliance Yes  Diabetic diet compliance most of the time  Patient monitors blood sugars regularly  once a week   Home glucose readings:  115   Denies hypoglycemic episodes yes  Denies polyuria, polydipsia, paraesthesia, vision changes? yes  Engaging in daily exercise? Yes: Comment: chair exercises, stretches and rom     Diabetic Foot and Eye Exam HM Status   Topic Date Due    Diabetic Foot Care  01/13/2022    Eye Exam  05/13/2024     Hemoglobin A1c   Date Value Ref Range Status   01/20/2023 7.3 (H) 4.2 - 5.6 % Final     Comment:     (NOTE)  HbA1C Interpretive Ranges  <5.7              Normal  5.7 - 6.4         Consider Prediabetes  >6.5              Consider Diabetes     ]  Creatinine, urine random   Date Value Ref Range Status   06/17/2022 75.00 30 - 125 mg/dL Final     Microalbumin/Creat ratio (mg/g creat)   Date Value Ref Range Status   06/17/2022 73 (H) 0 - 30 mg/g Final   07/26/2021 83 (H) 0 - 30 mg/g Final   03/25/2021 150 (H) 0 - 30 mg/g Final   09/04/2018 150 (H) 0 - 30 mg/g Final     Microalb/Creat ratio (ug/mg creat.)   Date Value Ref Range Status   09/10/2019 95.1 (H) 0.0 - 30.0 mg/g creat Final     Comment:                          Normal:                0.0 -  30.0                       Albuminuria:          31.0 - 300.0                       Clinical albuminuria:       >300.0       Key Antihyperglycemic Medications               metFORMIN (GLUCOPHAGE) 1,000 mg tablet (Taking) Take 1 Tablet by mouth two (2) times daily (with meals). pioglitazone (ACTOS) 15 mg tablet (Taking) Take 1 Tablet by mouth daily. Hypertension:  Visit Vitals  BP (!) 142/60   Pulse 64   Temp 98.2 °F (36.8 °C) (Temporal)   Resp 18   Ht 5' 3\" (1.6 m)   Wt 138 lb (62.6 kg)   SpO2 99%   BMI 24.45 kg/m²      Patient reports taking medications as instructed. yes   Medication side effects noted. no  Headache upon wakening. no   Home BP monitorin/60  Do you experience chest pain/pressure or SOB with exertion? no  Maintain a low Sodium diet? yes  Key CAD CHF Meds               lisinopriL (PRINIVIL, ZESTRIL) 40 mg tablet (Taking) TAKE 1 TABLET BY MOUTH DAILY    simvastatin (ZOCOR) 40 mg tablet (Taking) TAKE 1 TABLET BY MOUTH EVERY NIGHT    hydroCHLOROthiazide (HYDRODIURIL) 25 mg tablet (Taking) Take 1 Tablet by mouth daily. aspirin delayed-release 81 mg tablet (Taking) TAKE 1 TABLET BY MOUTH ONCE DAILY          BUN   Date Value Ref Range Status   2023 29 (H) 7.0 - 18 MG/DL Final     Creatinine   Date Value Ref Range Status   2023 0.98 0.6 - 1.3 MG/DL Final     GFR est AA   Date Value Ref Range Status   2022 >60 >60 ml/min/1.73m2 Final     Potassium   Date Value Ref Range Status   2023 5.2 3.5 - 5.5 mmol/L Final       HLD:  Has been compliant with meds  Yes  Compliant with low-fat diet. most of the time    Denies myalgias or other side effects. yes  The ASCVD Risk score (Martha DK, et al., 2019) failed to calculate for the following reasons: The 2019 ASCVD risk score is only valid for ages 36 to 78    Cholesterol, total   Date Value Ref Range Status   2023 151 <200 MG/DL Final     Triglyceride   Date Value Ref Range Status   2023 67 <150 MG/DL Final     Comment:     The drugs N-acetylcysteine (NAC) and  Metamiszole have been found to cause falsely  low results in this chemical assay. Please  be sure to submit blood samples obtained  BEFORE administration of either of these  drugs to assure correct results.        HDL Cholesterol   Date Value Ref Range Status   2023 77 (H) 40 - 60 MG/DL Final LDL, calculated   Date Value Ref Range Status   01/20/2023 60.6 0 - 100 MG/DL Final   ]  Key Antihyperlipidemia Meds               simvastatin (ZOCOR) 40 mg tablet (Taking) TAKE 1 TABLET BY MOUTH EVERY NIGHT             ROS:     ROS  As stated in HPI, otherwise all others negative. The problem list was updated as a part of today's visit. 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 SH:  Social History     Tobacco Use    Smoking status: Never    Smokeless tobacco: Never   Vaping Use    Vaping Use: Never used   Substance Use Topics    Alcohol use: No    Drug use: No       Medications/Allergies:  Current Outpatient Medications on File Prior to Visit   Medication Sig Dispense Refill    ferrous fumarate (Ferrocite) 324 mg (106 mg iron) tab TAKE 1 TABLET BY MOUTH TWICE DAILY 180 Tablet 3    lisinopriL (PRINIVIL, ZESTRIL) 40 mg tablet TAKE 1 TABLET BY MOUTH DAILY 90 Tablet 1    metFORMIN (GLUCOPHAGE) 1,000 mg tablet Take 1 Tablet by mouth two (2) times daily (with meals). 180 Tablet 2    simvastatin (ZOCOR) 40 mg tablet TAKE 1 TABLET BY MOUTH EVERY NIGHT 90 Tablet 1    pioglitazone (ACTOS) 15 mg tablet Take 1 Tablet by mouth daily. 90 Tablet 1    hydroCHLOROthiazide (HYDRODIURIL) 25 mg tablet Take 1 Tablet by mouth daily.  90 Tablet 3    glucose blood VI test strips (Embrace PRO test strips) strip Use to check blood sugar daily 50 Strip 5    aspirin delayed-release 81 mg tablet TAKE 1 TABLET BY MOUTH ONCE DAILY 90 Tab 5    cyanocobalamin (VITAMIN B-12) 1,000 mcg tablet Take 1 Tab by mouth daily. 90 Tab 0     No current facility-administered medications on file prior to visit. No Known Allergies    Objective:  Visit Vitals  BP (!) 142/60   Pulse 64   Temp 98.2 °F (36.8 °C) (Temporal)   Resp 18   Ht 5' 3\" (1.6 m)   Wt 138 lb (62.6 kg)   SpO2 99%   BMI 24.45 kg/m²    Body mass index is 24.45 kg/m². Physical assessment  Physical Exam  Vitals and nursing note reviewed. Eyes:      Conjunctiva/sclera: Conjunctivae normal.      Pupils: Pupils are equal, round, and reactive to light. Cardiovascular:      Rate and Rhythm: Normal rate and regular rhythm. Heart sounds: Normal heart sounds. No murmur heard. No friction rub. No gallop. Pulmonary:      Effort: Pulmonary effort is normal.      Breath sounds: Normal breath sounds. Musculoskeletal:         General: Normal range of motion. Cervical back: Normal range of motion. Skin:     General: Skin is warm and dry. Neurological:      Mental Status: He is alert.          Labwork and Ancillary Studies:    CBC w/Diff  Lab Results   Component Value Date/Time    WBC 4.8 06/17/2022 03:15 PM    HGB 9.8 (L) 06/17/2022 03:15 PM    PLATELET 587 85/17/1746 03:15 PM         Basic Metabolic Profile  Lab Results   Component Value Date/Time    Sodium 142 01/20/2023 10:57 AM    Potassium 5.2 01/20/2023 10:57 AM    Chloride 108 01/20/2023 10:57 AM    CO2 29 01/20/2023 10:57 AM    Anion gap 5 01/20/2023 10:57 AM    Glucose 104 (H) 01/20/2023 10:57 AM    BUN 29 (H) 01/20/2023 10:57 AM    Creatinine 0.98 01/20/2023 10:57 AM    BUN/Creatinine ratio 30 (H) 01/20/2023 10:57 AM    GFR est AA >60 06/17/2022 03:15 PM    GFR est non-AA >60 06/17/2022 03:15 PM    Calcium 9.2 01/20/2023 10:57 AM        Cholesterol  Lab Results   Component Value Date/Time    Cholesterol, total 151 01/20/2023 10:57 AM    HDL Cholesterol 77 (H) 01/20/2023 10:57 AM    LDL, calculated 60.6 01/20/2023 10:57 AM    Triglyceride 67 01/20/2023 10:57 AM    CHOL/HDL Ratio 2.0 01/20/2023 10:57 AM           I have discussed the diagnosis with the patient and the intended plan as seen in the above orders. The patient has received an After-Visit Summary and questions were answered concerning future plans. An After Visit Summary was printed and given to the patient. All diagnosis have been discussed with the patient and all of the patient's questions have been answered. Follow-up and Dispositions    Return in about 3 months (around 4/27/2023) for [address] [place] [address], DM, HTN, HLD, 30 min, office only, w/labs prior. Fritz Quintanilla, IDA-BC  [facility]   [address] [facility] [address]  [id number]

## 2023-01-27 NOTE — PROGRESS NOTES
Room 8     When asked if patient has any concerns he would like to address with YAHIR Mckeon patient states yes I am having a little discomfort in both shoulders. Did patient bring someone? Yes: Comment: son    Did the patient have DME equipment? No     Did you take your medication today? Yes       1. \"Have you been to the ER, urgent care clinic since your last visit? Hospitalized since your last visit? \" No    2. \"Have you seen or consulted any other health care providers outside of the [address] [place] [address] since your last visit? \" No     3. For patients aged 39-70: Has the patient had a colonoscopy / FIT/ Cologuard? NA - based on age      If the patient is female:    4. For patients aged 41-77: Has the patient had a mammogram within the past 2 years? NA - based on age or sex      11. For patients aged 21-65: Has the patient had a pap smear?  NA - based on age or sex        3 most recent PHQ Screens 1/27/2023   Little interest or pleasure in doing things Not at all   Feeling down, depressed, irritable, or hopeless Not at all   Total Score PHQ 2 0         Health Maintenance Due   Topic Date Due    Shingles Vaccine (1 of 2) Never done    Pneumococcal 65+ years (2 - PPSV23 if available, else PCV20) 09/27/2019    COVID-19 Vaccine (4 - Booster for Pfizer series) 12/24/2021    Foot Exam Q1  01/13/2022    Medicare Yearly Exam  02/24/2023       Learning Assessment 11/23/2021   PRIMARY LEARNER Patient   HIGHEST LEVEL OF EDUCATION - PRIMARY LEARNER  -   PRIMARY LANGUAGE Uzbek   LEARNER PREFERENCE PRIMARY VIDEOS   ANSWERED BY Myles CHAVARRIA SELF

## 2023-01-31 DIAGNOSIS — E78.2 MIXED HYPERLIPIDEMIA: ICD-10-CM

## 2023-01-31 DIAGNOSIS — I10 ESSENTIAL HYPERTENSION: ICD-10-CM

## 2023-01-31 RX ORDER — LISINOPRIL 40 MG/1
TABLET ORAL
Qty: 90 TABLET | Refills: 1 | Status: SHIPPED | OUTPATIENT
Start: 2023-01-31

## 2023-01-31 RX ORDER — SIMVASTATIN 40 MG/1
TABLET, FILM COATED ORAL
Qty: 90 TABLET | Refills: 1 | Status: SHIPPED | OUTPATIENT
Start: 2023-01-31

## 2023-02-17 RX ORDER — PIOGLITAZONEHYDROCHLORIDE 15 MG/1
TABLET ORAL
Qty: 90 TABLET | Refills: 1 | Status: SHIPPED | OUTPATIENT
Start: 2023-02-17

## 2023-04-19 DIAGNOSIS — I10 ESSENTIAL HYPERTENSION WITH GOAL BLOOD PRESSURE LESS THAN 130/80: Primary | ICD-10-CM

## 2023-04-19 DIAGNOSIS — R80.9 TYPE 2 DIABETES MELLITUS WITH MICROALBUMINURIA, WITHOUT LONG-TERM CURRENT USE OF INSULIN (HCC): ICD-10-CM

## 2023-04-19 DIAGNOSIS — E11.29 TYPE 2 DIABETES MELLITUS WITH MICROALBUMINURIA, WITHOUT LONG-TERM CURRENT USE OF INSULIN (HCC): ICD-10-CM

## 2023-04-19 DIAGNOSIS — D50.8 IRON DEFICIENCY ANEMIA SECONDARY TO INADEQUATE DIETARY IRON INTAKE: ICD-10-CM

## 2023-04-19 DIAGNOSIS — E78.2 MIXED HYPERLIPIDEMIA: ICD-10-CM

## 2023-04-19 DIAGNOSIS — R80.9 MICROALBUMINURIA: ICD-10-CM

## 2023-04-19 PROBLEM — Z89.511 S/P BKA (BELOW KNEE AMPUTATION), RIGHT (HCC): Status: ACTIVE | Noted: 2022-08-22

## 2023-04-19 PROBLEM — E11.319 DIABETIC RETINOPATHY ASSOCIATED WITH TYPE 2 DIABETES MELLITUS (HCC): Status: ACTIVE | Noted: 2021-11-23

## 2023-04-20 ENCOUNTER — HOSPITAL ENCOUNTER (OUTPATIENT)
Facility: HOSPITAL | Age: 83
Setting detail: SPECIMEN
Discharge: HOME | End: 2023-04-20
Payer: MEDICARE

## 2023-04-20 LAB
ALBUMIN SERPL-MCNC: 4.2 G/DL (ref 3.4–5)
ALBUMIN/GLOB SERPL: 1.3 (ref 0.8–1.7)
ALP SERPL-CCNC: 82 U/L (ref 45–117)
ALT SERPL-CCNC: 20 U/L (ref 16–61)
ANION GAP SERPL CALC-SCNC: 1 MMOL/L (ref 3–18)
AST SERPL-CCNC: 13 U/L (ref 10–38)
BILIRUB SERPL-MCNC: 0.2 MG/DL (ref 0.2–1)
BUN SERPL-MCNC: 37 MG/DL (ref 7–18)
BUN/CREAT SERPL: 35 (ref 12–20)
CALCIUM SERPL-MCNC: 9.5 MG/DL (ref 8.5–10.1)
CHLORIDE SERPL-SCNC: 112 MMOL/L (ref 100–111)
CHOLEST SERPL-MCNC: 159 MG/DL
CO2 SERPL-SCNC: 28 MMOL/L (ref 21–32)
CREAT SERPL-MCNC: 1.06 MG/DL (ref 0.6–1.3)
CREAT UR-MCNC: 59 MG/DL (ref 30–125)
ERYTHROCYTE [DISTWIDTH] IN BLOOD BY AUTOMATED COUNT: 12.7 % (ref 11.6–14.5)
EST. AVERAGE GLUCOSE BLD GHB EST-MCNC: 166 MG/DL
FERRITIN SERPL-MCNC: 67 NG/ML (ref 8–388)
GLOBULIN SER CALC-MCNC: 3.3 G/DL (ref 2–4)
GLUCOSE SERPL-MCNC: 93 MG/DL (ref 74–99)
HBA1C MFR BLD: 7.4 % (ref 4.2–5.6)
HCT VFR BLD AUTO: 33.6 % (ref 36–48)
HDLC SERPL-MCNC: 78 MG/DL (ref 40–60)
HDLC SERPL: 2 (ref 0–5)
HGB BLD-MCNC: 10.7 G/DL (ref 13–16)
LDLC SERPL CALC-MCNC: 65 MG/DL (ref 0–100)
LIPID PANEL: ABNORMAL
MCH RBC QN AUTO: 32.7 PG (ref 24–34)
MCHC RBC AUTO-ENTMCNC: 31.8 G/DL (ref 31–37)
MCV RBC AUTO: 102.8 FL (ref 78–100)
MICROALBUMIN UR-MCNC: 10.2 MG/DL (ref 0–3)
MICROALBUMIN/CREAT UR-RTO: 173 MG/G (ref 0–30)
NRBC # BLD: 0 K/UL (ref 0–0.01)
NRBC BLD-RTO: 0 PER 100 WBC
PLATELET # BLD AUTO: 199 K/UL (ref 135–420)
PMV BLD AUTO: 10.6 FL (ref 9.2–11.8)
POTASSIUM SERPL-SCNC: 5.2 MMOL/L (ref 3.5–5.5)
PROT SERPL-MCNC: 7.5 G/DL (ref 6.4–8.2)
RBC # BLD AUTO: 3.27 M/UL (ref 4.35–5.65)
SODIUM SERPL-SCNC: 141 MMOL/L (ref 136–145)
TRIGL SERPL-MCNC: 80 MG/DL
VLDLC SERPL CALC-MCNC: 16 MG/DL
WBC # BLD AUTO: 5.6 K/UL (ref 4.6–13.2)

## 2023-04-20 PROCEDURE — 85027 COMPLETE CBC AUTOMATED: CPT

## 2023-04-20 PROCEDURE — 82728 ASSAY OF FERRITIN: CPT

## 2023-04-20 PROCEDURE — 83036 HEMOGLOBIN GLYCOSYLATED A1C: CPT

## 2023-04-20 PROCEDURE — 82570 ASSAY OF URINE CREATININE: CPT

## 2023-04-20 PROCEDURE — 82043 UR ALBUMIN QUANTITATIVE: CPT

## 2023-04-20 PROCEDURE — 80061 LIPID PANEL: CPT

## 2023-04-20 PROCEDURE — 80053 COMPREHEN METABOLIC PANEL: CPT

## 2023-04-20 PROCEDURE — 36415 COLL VENOUS BLD VENIPUNCTURE: CPT

## 2023-04-27 ENCOUNTER — OFFICE VISIT (OUTPATIENT)
Facility: CLINIC | Age: 83
End: 2023-04-27

## 2023-04-27 VITALS
OXYGEN SATURATION: 98 % | HEART RATE: 60 BPM | SYSTOLIC BLOOD PRESSURE: 170 MMHG | HEIGHT: 63 IN | WEIGHT: 139 LBS | BODY MASS INDEX: 24.63 KG/M2 | RESPIRATION RATE: 18 BRPM | TEMPERATURE: 98.1 F | DIASTOLIC BLOOD PRESSURE: 60 MMHG

## 2023-04-27 DIAGNOSIS — I10 ESSENTIAL HYPERTENSION WITH GOAL BLOOD PRESSURE LESS THAN 130/80: ICD-10-CM

## 2023-04-27 DIAGNOSIS — E78.2 MIXED HYPERLIPIDEMIA: ICD-10-CM

## 2023-04-27 DIAGNOSIS — Z71.89 ADVANCED CARE PLANNING/COUNSELING DISCUSSION: ICD-10-CM

## 2023-04-27 DIAGNOSIS — R80.9 TYPE 2 DIABETES MELLITUS WITH MICROALBUMINURIA, WITHOUT LONG-TERM CURRENT USE OF INSULIN (HCC): ICD-10-CM

## 2023-04-27 DIAGNOSIS — E11.29 TYPE 2 DIABETES MELLITUS WITH MICROALBUMINURIA, WITHOUT LONG-TERM CURRENT USE OF INSULIN (HCC): ICD-10-CM

## 2023-04-27 DIAGNOSIS — Z00.00 MEDICARE ANNUAL WELLNESS VISIT, SUBSEQUENT: Primary | ICD-10-CM

## 2023-04-27 SDOH — ECONOMIC STABILITY: FOOD INSECURITY: WITHIN THE PAST 12 MONTHS, YOU WORRIED THAT YOUR FOOD WOULD RUN OUT BEFORE YOU GOT MONEY TO BUY MORE.: NEVER TRUE

## 2023-04-27 SDOH — ECONOMIC STABILITY: TRANSPORTATION INSECURITY
IN THE PAST 12 MONTHS, HAS THE LACK OF TRANSPORTATION KEPT YOU FROM MEDICAL APPOINTMENTS OR FROM GETTING MEDICATIONS?: NO

## 2023-04-27 SDOH — ECONOMIC STABILITY: FOOD INSECURITY: WITHIN THE PAST 12 MONTHS, THE FOOD YOU BOUGHT JUST DIDN'T LAST AND YOU DIDN'T HAVE MONEY TO GET MORE.: NEVER TRUE

## 2023-04-27 SDOH — ECONOMIC STABILITY: INCOME INSECURITY: IN THE LAST 12 MONTHS, WAS THERE A TIME WHEN YOU WERE NOT ABLE TO PAY THE MORTGAGE OR RENT ON TIME?: NO

## 2023-04-27 SDOH — ECONOMIC STABILITY: HOUSING INSECURITY
IN THE LAST 12 MONTHS, WAS THERE A TIME WHEN YOU DID NOT HAVE A STEADY PLACE TO SLEEP OR SLEPT IN A SHELTER (INCLUDING NOW)?: NO

## 2023-04-27 SDOH — ECONOMIC STABILITY: TRANSPORTATION INSECURITY
IN THE PAST 12 MONTHS, HAS LACK OF TRANSPORTATION KEPT YOU FROM MEETINGS, WORK, OR FROM GETTING THINGS NEEDED FOR DAILY LIVING?: NO

## 2023-04-27 ASSESSMENT — PATIENT HEALTH QUESTIONNAIRE - PHQ9
SUM OF ALL RESPONSES TO PHQ9 QUESTIONS 1 & 2: 0
SUM OF ALL RESPONSES TO PHQ QUESTIONS 1-9: 0
1. LITTLE INTEREST OR PLEASURE IN DOING THINGS: 0
SUM OF ALL RESPONSES TO PHQ QUESTIONS 1-9: 0
2. FEELING DOWN, DEPRESSED OR HOPELESS: 0
SUM OF ALL RESPONSES TO PHQ QUESTIONS 1-9: 0
SUM OF ALL RESPONSES TO PHQ QUESTIONS 1-9: 0

## 2023-04-27 ASSESSMENT — LIFESTYLE VARIABLES
HOW OFTEN DO YOU HAVE A DRINK CONTAINING ALCOHOL: NEVER
HOW MANY STANDARD DRINKS CONTAINING ALCOHOL DO YOU HAVE ON A TYPICAL DAY: PATIENT DOES NOT DRINK

## 2023-04-27 ASSESSMENT — SOCIAL DETERMINANTS OF HEALTH (SDOH): HOW HARD IS IT FOR YOU TO PAY FOR THE VERY BASICS LIKE FOOD, HOUSING, MEDICAL CARE, AND HEATING?: NOT HARD AT ALL

## 2023-04-27 NOTE — PROGRESS NOTES
Room 8     When asked if patient has any concerns she would like to address with NICOLE Guzman patient states yes my shoulders have been bothering me . Patient states hot water helps the pain . Did patient bring someone? Yes sON    Did the patient have DME equipment? No     Did you take your medication today? Yes       1. \"Have you been to the ER, urgent care clinic since your last visit? Hospitalized since your last visit? \" No     2. \"Have you seen or consulted any other health care providers outside of the [address] [place] [address] since your last visit? \" No     3. For patients aged 39-70: Has the patient had a colonoscopy / FIT/ Cologuard? N/A      If the patient is female:    4. For patients aged 41-77: Has the patient had a mammogram within the past 2 years? N/A      5. For patients aged 21-65: Has the patient had a pap smear? {Cancer Care Gap present?  N/A      PHQ-9  4/27/2023   Little interest or pleasure in doing things 0   Little interest or pleasure in doing things -   Feeling down, depressed, or hopeless 0   PHQ-2 Score 0   Total Score PHQ 2 -   PHQ-9 Total Score 0          Health Maintenance Due   Topic Date Due    Shingles vaccine (2 of 3) 07/31/2000    Pneumococcal 65+ years Vaccine (2 - PPSV23 if available, else PCV20) 11/22/2018    COVID-19 Vaccine (4 - Booster for That{img} series) 12/24/2021    Annual Wellness Visit (AWV)  02/24/2023

## 2023-04-27 NOTE — PROGRESS NOTES
William               Jose Daniel Caicedo               [phone]    Medicare Annual Wellness Visit    Indy Jameson is here for Medicare AWV, Follow-up Chronic Condition, Diabetes, Hypertension, and Cholesterol Problem    Assessment & Plan   Medicare annual wellness visit, subsequent  Completed today to include ETOH and depression screening  Advanced care planning/counseling discussion  Healthcare decision maker verified and ACP Discussion performed and documented in note  Type 2 diabetes mellitus with microalbuminuria, without   long-term current use of insulin ([address]  Endorses medication compliance, Denies signs and symptoms of hyper or hypoglycemia, and diabetes controlled A1C <8%  Essential hypertension with goal blood pressure less than 130/80  Endorses medication compliance and Blood pressure uncontrolled continue lisinopril and hctz for now, recheck in one month, home blood pressure are stable  Mixed hyperlipidemia   Endorses medication compliance and Denies abdominal pain or symptoms of myalgia       Recommendations for Preventive Services Due: see orders and patient instructions/AVS.  Recommended screening schedule for the next 5-10 years is provided to the patient in written form: see Patient Instructions/AVS.     Return in about 4 weeks (around 5/25/2023) for HTN, 15min, office. Accompanied today by Alexia Dominique who was also the     Subjective   The following acute and/or chronic problems were also addressed today:  DMII-   Patient reports medication compliance Yes  Diabetic diet compliance frequently  Patient monitors blood sugars regularly  1X a week   Home glucose readings: 131   Denies hypoglycemic episodes Yes  Denies polyuria, polydipsia, paraesthesia, vision changes? Yes  Engaging in daily exercise? Stays active in the house, up and down the stairs 4x a day   There are no preventive care reminders to display for this patient.   Lab Results   Component

## 2023-04-27 NOTE — ACP (ADVANCE CARE PLANNING)
Advance Care Planning     General Advance Care Planning (ACP) Conversation    Date of Conversation: 4/27/2023  Conducted with: Patient with Decision Making Capacity    Healthcare Decision Maker:    Primary Decision Maker: Kade Mckeon - Child - [phone]  Click here to complete Healthcare Decision Makers including selection of the Healthcare Decision Maker Relationship (ie \"Primary\"). Today we documented Decision Maker(s) consistent with Legal Next of Kin hierarchy.     Content/Action Overview:  Has NO ACP documents/care preferences - information provided, considering goals and options  Reviewed DNR/DNI and patient elects Full Code (Attempt Resuscitation)  resuscitation preferences      Length of Voluntary ACP Conversation in minutes:  <16 minutes (Non-Billable)    Gilson David, APRN - CNP

## 2023-05-26 ENCOUNTER — OFFICE VISIT (OUTPATIENT)
Facility: CLINIC | Age: 83
End: 2023-05-26
Payer: MEDICARE

## 2023-05-26 VITALS
SYSTOLIC BLOOD PRESSURE: 138 MMHG | OXYGEN SATURATION: 100 % | TEMPERATURE: 97.2 F | WEIGHT: 138 LBS | BODY MASS INDEX: 24.45 KG/M2 | RESPIRATION RATE: 16 BRPM | DIASTOLIC BLOOD PRESSURE: 58 MMHG | HEART RATE: 61 BPM | HEIGHT: 63 IN

## 2023-05-26 DIAGNOSIS — E11.29 TYPE 2 DIABETES MELLITUS WITH MICROALBUMINURIA, WITHOUT LONG-TERM CURRENT USE OF INSULIN (HCC): ICD-10-CM

## 2023-05-26 DIAGNOSIS — E78.2 MIXED HYPERLIPIDEMIA: ICD-10-CM

## 2023-05-26 DIAGNOSIS — I10 ESSENTIAL HYPERTENSION WITH GOAL BLOOD PRESSURE LESS THAN 130/80: Primary | ICD-10-CM

## 2023-05-26 DIAGNOSIS — R80.9 TYPE 2 DIABETES MELLITUS WITH MICROALBUMINURIA, WITHOUT LONG-TERM CURRENT USE OF INSULIN (HCC): ICD-10-CM

## 2023-05-26 PROCEDURE — 3075F SYST BP GE 130 - 139MM HG: CPT | Performed by: NURSE PRACTITIONER

## 2023-05-26 PROCEDURE — 1123F ACP DISCUSS/DSCN MKR DOCD: CPT | Performed by: NURSE PRACTITIONER

## 2023-05-26 PROCEDURE — 99213 OFFICE O/P EST LOW 20 MIN: CPT | Performed by: NURSE PRACTITIONER

## 2023-05-26 PROCEDURE — 3051F HG A1C>EQUAL 7.0%<8.0%: CPT | Performed by: NURSE PRACTITIONER

## 2023-05-26 PROCEDURE — 3078F DIAST BP <80 MM HG: CPT | Performed by: NURSE PRACTITIONER

## 2023-05-26 SDOH — ECONOMIC STABILITY: FOOD INSECURITY: WITHIN THE PAST 12 MONTHS, THE FOOD YOU BOUGHT JUST DIDN'T LAST AND YOU DIDN'T HAVE MONEY TO GET MORE.: NEVER TRUE

## 2023-05-26 SDOH — ECONOMIC STABILITY: FOOD INSECURITY: WITHIN THE PAST 12 MONTHS, YOU WORRIED THAT YOUR FOOD WOULD RUN OUT BEFORE YOU GOT MONEY TO BUY MORE.: NEVER TRUE

## 2023-05-26 SDOH — ECONOMIC STABILITY: INCOME INSECURITY: HOW HARD IS IT FOR YOU TO PAY FOR THE VERY BASICS LIKE FOOD, HOUSING, MEDICAL CARE, AND HEATING?: NOT VERY HARD

## 2023-05-26 ASSESSMENT — PATIENT HEALTH QUESTIONNAIRE - PHQ9
1. LITTLE INTEREST OR PLEASURE IN DOING THINGS: 0
2. FEELING DOWN, DEPRESSED OR HOPELESS: 0
SUM OF ALL RESPONSES TO PHQ QUESTIONS 1-9: 0
SUM OF ALL RESPONSES TO PHQ9 QUESTIONS 1 & 2: 0

## 2023-05-26 NOTE — PROGRESS NOTES
Jose Daniel Agee               [phone]      Diallo Aguilera is a 80 y.o. male and presents with Blood Pressure Check and Follow-up       Assessment/Plan:    1. Essential hypertension with goal blood pressure less than 130/80  -     Comprehensive Metabolic Panel; Future  Endorses medication compliance, Follow up labs prior to next visit, and Blood pressure stable per home checks, continue lisinopril and hctz at same dose  2. Type 2 diabetes mellitus with microalbuminuria, without long-term current use of insulin (HCC)  -     Hemoglobin A1C; Future  Follow up labs prior to next visit  3. Mixed hyperlipidemia  -     Lipid Panel; Future   Follow up labs prior to next visit      Follow up and disposition:   Return in about 4 months (around 9/26/2023) for DM, HLD, HTN, 15min, office, w/labsprior. Subjective:    Labs obtained prior to visit? No  Reviewed with patient? N/A    Follow up for elevated blood pressure in the office with diagnosis of hypertension    Hypertension:  BP (!) 138/58 Comment: manual  Pulse 61   Temp 97.2 °F (36.2 °C) (Temporal)   Resp 16   Ht 5' 3\" (1.6 m)   Wt 138 lb (62.6 kg)   SpO2 100%   BMI 24.45 kg/m²    Patient reports taking medications as instructed. Yes   Medication side effects noted no  Headache upon wakening. no   Home BP monitoring: yes, averaging 140/60, home cuff in office reading is 165/66  Do you experience chest pain/pressure or SOB with exertion? no  Maintain a low Sodium diet?  Yes  Lab Results   Component Value Date/Time    BUN 37 04/20/2023 09:50 AM    CREATININE 1.06 04/20/2023 09:50 AM    LABGLOM >60 04/20/2023 09:50 AM    K 5.2 04/20/2023 09:50 AM     Key Anti-Hypertensive Meds            hydroCHLOROthiazide (HYDRODIURIL) 25 MG tablet (Taking)    Class: Historical Med    lisinopril (PRINIVIL;ZESTRIL) 40 MG tablet (Taking)    Class: Historical Med             ROS:     Review of Systems  As stated in HPI, otherwise all

## 2023-08-21 RX ORDER — PIOGLITAZONEHYDROCHLORIDE 15 MG/1
TABLET ORAL
Qty: 90 TABLET | Refills: 1 | Status: SHIPPED | OUTPATIENT
Start: 2023-08-21

## 2023-08-22 RX ORDER — PIOGLITAZONEHYDROCHLORIDE 15 MG/1
15 TABLET ORAL DAILY
Qty: 90 TABLET | Refills: 1 | Status: CANCELLED | OUTPATIENT
Start: 2023-08-22

## 2023-08-23 RX ORDER — HYDROCHLOROTHIAZIDE 25 MG/1
25 TABLET ORAL DAILY
Qty: 90 TABLET | Refills: 1 | Status: SHIPPED | OUTPATIENT
Start: 2023-08-23

## 2023-08-23 RX ORDER — SIMVASTATIN 40 MG
TABLET ORAL
Qty: 90 TABLET | Refills: 1 | Status: SHIPPED | OUTPATIENT
Start: 2023-08-23

## 2023-08-23 RX ORDER — LISINOPRIL 40 MG/1
TABLET ORAL
Qty: 90 TABLET | Refills: 1 | Status: SHIPPED | OUTPATIENT
Start: 2023-08-23

## 2023-09-18 RX ORDER — FERROUS FUMARATE 324(106)MG
1 TABLET ORAL 2 TIMES DAILY
Qty: 180 TABLET | Refills: 1 | Status: SHIPPED | OUTPATIENT
Start: 2023-09-18

## 2023-09-21 ENCOUNTER — HOSPITAL ENCOUNTER (OUTPATIENT)
Facility: HOSPITAL | Age: 83
Setting detail: SPECIMEN
Discharge: HOME | End: 2023-09-21
Payer: MEDICARE

## 2023-09-21 LAB
ALBUMIN SERPL-MCNC: 3.8 G/DL (ref 3.4–5)
ALBUMIN/GLOB SERPL: 1.1 (ref 0.8–1.7)
ALP SERPL-CCNC: 81 U/L (ref 45–117)
ALT SERPL-CCNC: 22 U/L (ref 16–61)
ANION GAP SERPL CALC-SCNC: 2 MMOL/L (ref 3–18)
AST SERPL-CCNC: 15 U/L (ref 10–38)
BILIRUB SERPL-MCNC: 0.2 MG/DL (ref 0.2–1)
BUN SERPL-MCNC: 37 MG/DL (ref 7–18)
BUN/CREAT SERPL: 35 (ref 12–20)
CALCIUM SERPL-MCNC: 9.7 MG/DL (ref 8.5–10.1)
CHLORIDE SERPL-SCNC: 110 MMOL/L (ref 100–111)
CHOLEST SERPL-MCNC: 157 MG/DL
CO2 SERPL-SCNC: 29 MMOL/L (ref 21–32)
CREAT SERPL-MCNC: 1.07 MG/DL (ref 0.6–1.3)
EST. AVERAGE GLUCOSE BLD GHB EST-MCNC: 163 MG/DL
GLOBULIN SER CALC-MCNC: 3.6 G/DL (ref 2–4)
GLUCOSE SERPL-MCNC: 117 MG/DL (ref 74–99)
HBA1C MFR BLD: 7.3 % (ref 4.2–5.6)
HDLC SERPL-MCNC: 72 MG/DL (ref 40–60)
HDLC SERPL: 2.2 (ref 0–5)
LDLC SERPL CALC-MCNC: 68.8 MG/DL (ref 0–100)
LIPID PANEL: ABNORMAL
POTASSIUM SERPL-SCNC: 5.6 MMOL/L (ref 3.5–5.5)
PROT SERPL-MCNC: 7.4 G/DL (ref 6.4–8.2)
SODIUM SERPL-SCNC: 141 MMOL/L (ref 136–145)
TRIGL SERPL-MCNC: 81 MG/DL
VLDLC SERPL CALC-MCNC: 16.2 MG/DL

## 2023-09-21 PROCEDURE — 80053 COMPREHEN METABOLIC PANEL: CPT

## 2023-09-21 PROCEDURE — 83036 HEMOGLOBIN GLYCOSYLATED A1C: CPT

## 2023-09-21 PROCEDURE — 80061 LIPID PANEL: CPT

## 2023-09-21 PROCEDURE — 36415 COLL VENOUS BLD VENIPUNCTURE: CPT

## 2023-09-28 ENCOUNTER — OFFICE VISIT (OUTPATIENT)
Facility: CLINIC | Age: 83
End: 2023-09-28
Payer: MEDICARE

## 2023-09-28 VITALS
HEIGHT: 63 IN | TEMPERATURE: 98 F | HEART RATE: 60 BPM | RESPIRATION RATE: 18 BRPM | SYSTOLIC BLOOD PRESSURE: 162 MMHG | OXYGEN SATURATION: 98 % | WEIGHT: 138 LBS | BODY MASS INDEX: 24.45 KG/M2 | DIASTOLIC BLOOD PRESSURE: 50 MMHG

## 2023-09-28 DIAGNOSIS — E11.29 TYPE 2 DIABETES MELLITUS WITH MICROALBUMINURIA, WITHOUT LONG-TERM CURRENT USE OF INSULIN (HCC): Primary | ICD-10-CM

## 2023-09-28 DIAGNOSIS — I10 ESSENTIAL HYPERTENSION WITH GOAL BLOOD PRESSURE LESS THAN 130/80: ICD-10-CM

## 2023-09-28 DIAGNOSIS — E78.2 MIXED HYPERLIPIDEMIA: ICD-10-CM

## 2023-09-28 DIAGNOSIS — R80.9 TYPE 2 DIABETES MELLITUS WITH MICROALBUMINURIA, WITHOUT LONG-TERM CURRENT USE OF INSULIN (HCC): Primary | ICD-10-CM

## 2023-09-28 PROCEDURE — 90694 VACC AIIV4 NO PRSRV 0.5ML IM: CPT | Performed by: NURSE PRACTITIONER

## 2023-09-28 PROCEDURE — 3074F SYST BP LT 130 MM HG: CPT | Performed by: NURSE PRACTITIONER

## 2023-09-28 PROCEDURE — 1123F ACP DISCUSS/DSCN MKR DOCD: CPT | Performed by: NURSE PRACTITIONER

## 2023-09-28 PROCEDURE — 99214 OFFICE O/P EST MOD 30 MIN: CPT | Performed by: NURSE PRACTITIONER

## 2023-09-28 PROCEDURE — G0008 ADMIN INFLUENZA VIRUS VAC: HCPCS | Performed by: NURSE PRACTITIONER

## 2023-09-28 PROCEDURE — 3051F HG A1C>EQUAL 7.0%<8.0%: CPT | Performed by: NURSE PRACTITIONER

## 2023-09-28 PROCEDURE — 3078F DIAST BP <80 MM HG: CPT | Performed by: NURSE PRACTITIONER

## 2023-09-28 RX ORDER — AMLODIPINE BESYLATE 2.5 MG/1
2.5 TABLET ORAL DAILY
Qty: 30 TABLET | Refills: 0 | Status: SHIPPED | OUTPATIENT
Start: 2023-09-28

## 2023-09-28 ASSESSMENT — PATIENT HEALTH QUESTIONNAIRE - PHQ9
2. FEELING DOWN, DEPRESSED OR HOPELESS: 0
SUM OF ALL RESPONSES TO PHQ QUESTIONS 1-9: 0
SUM OF ALL RESPONSES TO PHQ9 QUESTIONS 1 & 2: 0
SUM OF ALL RESPONSES TO PHQ QUESTIONS 1-9: 0
SUM OF ALL RESPONSES TO PHQ QUESTIONS 1-9: 0
1. LITTLE INTEREST OR PLEASURE IN DOING THINGS: 0
SUM OF ALL RESPONSES TO PHQ QUESTIONS 1-9: 0

## 2023-09-29 DIAGNOSIS — I10 ESSENTIAL HYPERTENSION WITH GOAL BLOOD PRESSURE LESS THAN 130/80: ICD-10-CM

## 2023-09-29 RX ORDER — AMLODIPINE BESYLATE 2.5 MG/1
2.5 TABLET ORAL DAILY
Qty: 90 TABLET | OUTPATIENT
Start: 2023-09-29

## 2023-10-26 ENCOUNTER — OFFICE VISIT (OUTPATIENT)
Facility: CLINIC | Age: 83
End: 2023-10-26
Payer: MEDICARE

## 2023-10-26 VITALS
HEART RATE: 59 BPM | WEIGHT: 139 LBS | HEIGHT: 63 IN | BODY MASS INDEX: 24.63 KG/M2 | DIASTOLIC BLOOD PRESSURE: 56 MMHG | OXYGEN SATURATION: 97 % | TEMPERATURE: 98.2 F | SYSTOLIC BLOOD PRESSURE: 176 MMHG | RESPIRATION RATE: 20 BRPM

## 2023-10-26 DIAGNOSIS — I10 ESSENTIAL HYPERTENSION WITH GOAL BLOOD PRESSURE LESS THAN 130/80: Primary | ICD-10-CM

## 2023-10-26 PROCEDURE — 3078F DIAST BP <80 MM HG: CPT | Performed by: NURSE PRACTITIONER

## 2023-10-26 PROCEDURE — 1123F ACP DISCUSS/DSCN MKR DOCD: CPT | Performed by: NURSE PRACTITIONER

## 2023-10-26 PROCEDURE — 3077F SYST BP >= 140 MM HG: CPT | Performed by: NURSE PRACTITIONER

## 2023-10-26 PROCEDURE — 99213 OFFICE O/P EST LOW 20 MIN: CPT | Performed by: NURSE PRACTITIONER

## 2023-10-26 RX ORDER — AMLODIPINE BESYLATE 5 MG/1
5 TABLET ORAL DAILY
Qty: 30 TABLET | Refills: 3 | Status: SHIPPED | OUTPATIENT
Start: 2023-10-26

## 2023-10-26 ASSESSMENT — ANXIETY QUESTIONNAIRES
GAD7 TOTAL SCORE: 0
6. BECOMING EASILY ANNOYED OR IRRITABLE: 0
4. TROUBLE RELAXING: 0
IF YOU CHECKED OFF ANY PROBLEMS ON THIS QUESTIONNAIRE, HOW DIFFICULT HAVE THESE PROBLEMS MADE IT FOR YOU TO DO YOUR WORK, TAKE CARE OF THINGS AT HOME, OR GET ALONG WITH OTHER PEOPLE: NOT DIFFICULT AT ALL
5. BEING SO RESTLESS THAT IT IS HARD TO SIT STILL: 0
2. NOT BEING ABLE TO STOP OR CONTROL WORRYING: 0
7. FEELING AFRAID AS IF SOMETHING AWFUL MIGHT HAPPEN: 0
3. WORRYING TOO MUCH ABOUT DIFFERENT THINGS: 0
1. FEELING NERVOUS, ANXIOUS, OR ON EDGE: 0

## 2023-10-26 ASSESSMENT — PATIENT HEALTH QUESTIONNAIRE - PHQ9
SUM OF ALL RESPONSES TO PHQ QUESTIONS 1-9: 0
SUM OF ALL RESPONSES TO PHQ QUESTIONS 1-9: 0
1. LITTLE INTEREST OR PLEASURE IN DOING THINGS: 0
2. FEELING DOWN, DEPRESSED OR HOPELESS: 0
SUM OF ALL RESPONSES TO PHQ9 QUESTIONS 1 & 2: 0
SUM OF ALL RESPONSES TO PHQ QUESTIONS 1-9: 0
SUM OF ALL RESPONSES TO PHQ QUESTIONS 1-9: 0

## 2023-11-01 ENCOUNTER — NURSE ONLY (OUTPATIENT)
Facility: CLINIC | Age: 83
End: 2023-11-01

## 2023-11-01 DIAGNOSIS — I10 ESSENTIAL HYPERTENSION WITH GOAL BLOOD PRESSURE LESS THAN 130/80: Primary | ICD-10-CM

## 2023-11-01 RX ORDER — AMLODIPINE BESYLATE 10 MG/1
10 TABLET ORAL DAILY
Qty: 90 TABLET | Refills: 1
Start: 2023-11-01

## 2023-11-29 ENCOUNTER — NURSE ONLY (OUTPATIENT)
Facility: CLINIC | Age: 83
End: 2023-11-29

## 2023-11-29 VITALS — DIASTOLIC BLOOD PRESSURE: 62 MMHG | HEART RATE: 62 BPM | SYSTOLIC BLOOD PRESSURE: 154 MMHG

## 2023-11-29 DIAGNOSIS — I10 ESSENTIAL HYPERTENSION WITH GOAL BLOOD PRESSURE LESS THAN 130/80: Primary | ICD-10-CM

## 2023-11-29 NOTE — PROGRESS NOTES
Takes his medication daily at [id number]  Home blood pressure running from Low 131/59 to high 165/57  Will continue amlodipine, hctz and lisinopril at same dose  Do not want to risk hypotension

## 2023-11-30 DIAGNOSIS — I10 ESSENTIAL HYPERTENSION WITH GOAL BLOOD PRESSURE LESS THAN 130/80: ICD-10-CM

## 2023-11-30 RX ORDER — AMLODIPINE BESYLATE 10 MG/1
10 TABLET ORAL DAILY
Qty: 90 TABLET | Refills: 1 | Status: SHIPPED | OUTPATIENT
Start: 2023-11-30

## 2024-01-22 ENCOUNTER — HOSPITAL ENCOUNTER (OUTPATIENT)
Facility: HOSPITAL | Age: 84
Setting detail: SPECIMEN
Discharge: HOME | End: 2024-01-25
Payer: MEDICARE

## 2024-01-22 ENCOUNTER — NURSE ONLY (OUTPATIENT)
Facility: CLINIC | Age: 84
End: 2024-01-22

## 2024-01-22 DIAGNOSIS — I10 ESSENTIAL HYPERTENSION WITH GOAL BLOOD PRESSURE LESS THAN 130/80: ICD-10-CM

## 2024-01-22 DIAGNOSIS — E78.2 MIXED HYPERLIPIDEMIA: ICD-10-CM

## 2024-01-22 DIAGNOSIS — R80.9 TYPE 2 DIABETES MELLITUS WITH MICROALBUMINURIA, WITHOUT LONG-TERM CURRENT USE OF INSULIN (HCC): ICD-10-CM

## 2024-01-22 DIAGNOSIS — E11.29 TYPE 2 DIABETES MELLITUS WITH MICROALBUMINURIA, WITHOUT LONG-TERM CURRENT USE OF INSULIN (HCC): ICD-10-CM

## 2024-01-22 LAB
ALBUMIN SERPL-MCNC: 3.8 G/DL (ref 3.4–5)
ALBUMIN/GLOB SERPL: 1.2 (ref 0.8–1.7)
ALP SERPL-CCNC: 89 U/L (ref 45–117)
ALT SERPL-CCNC: 20 U/L (ref 16–61)
ANION GAP SERPL CALC-SCNC: 2 MMOL/L (ref 3–18)
AST SERPL-CCNC: 15 U/L (ref 10–38)
BILIRUB SERPL-MCNC: 0.2 MG/DL (ref 0.2–1)
BUN SERPL-MCNC: 35 MG/DL (ref 7–18)
BUN/CREAT SERPL: 28 (ref 12–20)
CALCIUM SERPL-MCNC: 9.2 MG/DL (ref 8.5–10.1)
CHLORIDE SERPL-SCNC: 111 MMOL/L (ref 100–111)
CHOLEST SERPL-MCNC: 154 MG/DL
CO2 SERPL-SCNC: 26 MMOL/L (ref 21–32)
CREAT SERPL-MCNC: 1.25 MG/DL (ref 0.6–1.3)
CREAT UR-MCNC: 81 MG/DL (ref 30–125)
EST. AVERAGE GLUCOSE BLD GHB EST-MCNC: 151 MG/DL
GLOBULIN SER CALC-MCNC: 3.3 G/DL (ref 2–4)
GLUCOSE SERPL-MCNC: 102 MG/DL (ref 74–99)
HBA1C MFR BLD: 6.9 % (ref 4.2–5.6)
HDLC SERPL-MCNC: 70 MG/DL (ref 40–60)
HDLC SERPL: 2.2 (ref 0–5)
LDLC SERPL CALC-MCNC: 67 MG/DL (ref 0–100)
LIPID PANEL: ABNORMAL
MICROALBUMIN UR-MCNC: 10.4 MG/DL (ref 0–3)
MICROALBUMIN/CREAT UR-RTO: 128 MG/G (ref 0–30)
POTASSIUM SERPL-SCNC: 5.9 MMOL/L (ref 3.5–5.5)
PROT SERPL-MCNC: 7.1 G/DL (ref 6.4–8.2)
SODIUM SERPL-SCNC: 139 MMOL/L (ref 136–145)
TRIGL SERPL-MCNC: 85 MG/DL
VLDLC SERPL CALC-MCNC: 17 MG/DL

## 2024-01-22 PROCEDURE — 83036 HEMOGLOBIN GLYCOSYLATED A1C: CPT

## 2024-01-22 PROCEDURE — 82043 UR ALBUMIN QUANTITATIVE: CPT

## 2024-01-22 PROCEDURE — 82570 ASSAY OF URINE CREATININE: CPT

## 2024-01-22 PROCEDURE — 80053 COMPREHEN METABOLIC PANEL: CPT

## 2024-01-22 PROCEDURE — 80061 LIPID PANEL: CPT

## 2024-01-26 SDOH — HEALTH STABILITY: PHYSICAL HEALTH: ON AVERAGE, HOW MANY MINUTES DO YOU ENGAGE IN EXERCISE AT THIS LEVEL?: 30 MIN

## 2024-01-26 SDOH — HEALTH STABILITY: PHYSICAL HEALTH: ON AVERAGE, HOW MANY DAYS PER WEEK DO YOU ENGAGE IN MODERATE TO STRENUOUS EXERCISE (LIKE A BRISK WALK)?: 3 DAYS

## 2024-01-26 ASSESSMENT — LIFESTYLE VARIABLES
HOW OFTEN DO YOU HAVE A DRINK CONTAINING ALCOHOL: NEVER
HOW OFTEN DO YOU HAVE SIX OR MORE DRINKS ON ONE OCCASION: 1
HOW MANY STANDARD DRINKS CONTAINING ALCOHOL DO YOU HAVE ON A TYPICAL DAY: PATIENT DOES NOT DRINK
HOW OFTEN DO YOU HAVE A DRINK CONTAINING ALCOHOL: 1
HOW MANY STANDARD DRINKS CONTAINING ALCOHOL DO YOU HAVE ON A TYPICAL DAY: 0

## 2024-01-26 ASSESSMENT — PATIENT HEALTH QUESTIONNAIRE - PHQ9
SUM OF ALL RESPONSES TO PHQ QUESTIONS 1-9: 0
SUM OF ALL RESPONSES TO PHQ9 QUESTIONS 1 & 2: 0
SUM OF ALL RESPONSES TO PHQ QUESTIONS 1-9: 0
1. LITTLE INTEREST OR PLEASURE IN DOING THINGS: 0
2. FEELING DOWN, DEPRESSED OR HOPELESS: 0

## 2024-01-29 ENCOUNTER — OFFICE VISIT (OUTPATIENT)
Facility: CLINIC | Age: 84
End: 2024-01-29
Payer: MEDICARE

## 2024-01-29 VITALS
WEIGHT: 141 LBS | HEIGHT: 63 IN | TEMPERATURE: 98 F | RESPIRATION RATE: 16 BRPM | SYSTOLIC BLOOD PRESSURE: 154 MMHG | HEART RATE: 70 BPM | BODY MASS INDEX: 24.98 KG/M2 | OXYGEN SATURATION: 98 % | DIASTOLIC BLOOD PRESSURE: 50 MMHG

## 2024-01-29 DIAGNOSIS — I10 ESSENTIAL HYPERTENSION WITH GOAL BLOOD PRESSURE LESS THAN 130/80: ICD-10-CM

## 2024-01-29 DIAGNOSIS — E11.319 DIABETIC RETINOPATHY ASSOCIATED WITH TYPE 2 DIABETES MELLITUS, MACULAR EDEMA PRESENCE UNSPECIFIED, UNSPECIFIED LATERALITY, UNSPECIFIED RETINOPATHY SEVERITY (HCC): ICD-10-CM

## 2024-01-29 DIAGNOSIS — E11.29 TYPE 2 DIABETES MELLITUS WITH MICROALBUMINURIA, WITHOUT LONG-TERM CURRENT USE OF INSULIN (HCC): ICD-10-CM

## 2024-01-29 DIAGNOSIS — Z89.511 S/P BKA (BELOW KNEE AMPUTATION), RIGHT (HCC): ICD-10-CM

## 2024-01-29 DIAGNOSIS — E78.2 MIXED HYPERLIPIDEMIA: ICD-10-CM

## 2024-01-29 DIAGNOSIS — E87.5 HYPERKALEMIA: ICD-10-CM

## 2024-01-29 DIAGNOSIS — D50.9 IRON DEFICIENCY ANEMIA, UNSPECIFIED IRON DEFICIENCY ANEMIA TYPE: ICD-10-CM

## 2024-01-29 DIAGNOSIS — R80.9 TYPE 2 DIABETES MELLITUS WITH MICROALBUMINURIA, WITHOUT LONG-TERM CURRENT USE OF INSULIN (HCC): ICD-10-CM

## 2024-01-29 DIAGNOSIS — Z71.89 ADVANCED CARE PLANNING/COUNSELING DISCUSSION: ICD-10-CM

## 2024-01-29 DIAGNOSIS — Z00.00 MEDICARE ANNUAL WELLNESS VISIT, SUBSEQUENT: Primary | ICD-10-CM

## 2024-01-29 PROCEDURE — 3044F HG A1C LEVEL LT 7.0%: CPT | Performed by: NURSE PRACTITIONER

## 2024-01-29 PROCEDURE — 1123F ACP DISCUSS/DSCN MKR DOCD: CPT | Performed by: NURSE PRACTITIONER

## 2024-01-29 PROCEDURE — 3077F SYST BP >= 140 MM HG: CPT | Performed by: NURSE PRACTITIONER

## 2024-01-29 PROCEDURE — 3078F DIAST BP <80 MM HG: CPT | Performed by: NURSE PRACTITIONER

## 2024-01-29 PROCEDURE — G0439 PPPS, SUBSEQ VISIT: HCPCS | Performed by: NURSE PRACTITIONER

## 2024-01-29 RX ORDER — SIMVASTATIN 40 MG
40 TABLET ORAL NIGHTLY
Qty: 90 TABLET | Refills: 1 | Status: SHIPPED | OUTPATIENT
Start: 2024-01-29

## 2024-01-29 RX ORDER — HYDROCHLOROTHIAZIDE 25 MG/1
25 TABLET ORAL DAILY
Qty: 90 TABLET | Refills: 1 | Status: SHIPPED | OUTPATIENT
Start: 2024-01-29

## 2024-01-29 RX ORDER — PIOGLITAZONEHYDROCHLORIDE 15 MG/1
15 TABLET ORAL DAILY
Qty: 90 TABLET | Refills: 1 | Status: SHIPPED | OUTPATIENT
Start: 2024-01-29

## 2024-01-29 RX ORDER — LISINOPRIL 40 MG/1
40 TABLET ORAL DAILY
Qty: 90 TABLET | Refills: 1 | Status: SHIPPED | OUTPATIENT
Start: 2024-01-29

## 2024-01-29 NOTE — PROGRESS NOTES
Barry Stone is a 83 y.o. male (: 1940) presenting to address:    Chief Complaint   Patient presents with    Medicare AWV       Vitals:    24 0931   BP: (!) 165/58   Pulse: 70   Resp: 16   Temp: 98 °F (36.7 °C)   SpO2: 98%       Coordination of Care Questionaire:   1. \"Have you been to the ER, urgent care clinic since your last visit?  Hospitalized since your last visit?\" no    2. \"Have you seen or consulted any other health care providers outside of the [facility] since your last visit?\" No     3. For patients aged 45-75: Has the patient had a colonoscopy / FIT/ Cologuard? N/a      If the patient is female:    4. For patients aged 40-74: Has the patient had a mammogram within the past 2 years? N/A      5. For patients aged 21-65: Has the patient had a pap smear? N/a    Advanced Directive:   1. Do you have an Advanced Directive? no    2. Would you like information on Advanced Directives? no

## 2024-01-29 NOTE — PROGRESS NOTES
Medicare Annual Wellness Visit    Barry Stone is here for Medicare AWV    Assessment & Plan   Medicare annual wellness visit, subsequent  Completed today to include ETOH and depression screening  Advanced care planning/counseling discussion  Healthcare decision maker verified and ACP Discussion performed and documented in note  Essential hypertension with goal blood pressure less than 130/80  -     hydroCHLOROthiazide (HYDRODIURIL) 25 MG tablet; Take 1 tablet by mouth daily, Disp-90 tablet, R-1Normal  -     lisinopril (PRINIVIL;ZESTRIL) 40 MG tablet; Take 1 tablet by mouth daily, Disp-90 tablet, R-1Normal  -     Comprehensive Metabolic Panel; Future  Endorses medication compliance, Refill provided, Follow up labs prior to next visit, and Blood pressure uncontrolled short follow up if still elevated at next visit will change medication   Mixed hyperlipidemia  -     simvastatin (ZOCOR) 40 MG tablet; Take 1 tablet by mouth nightly, Disp-90 tablet, R-1Normal  -     Lipid Panel; Future  Endorses medication compliance, Refill provided, Follow up labs prior to next visit, and Denies abdominal pain or symptoms of myalgia  Type 2 diabetes mellitus with microalbuminuria, without long-term current use of insulin (HCC)  -     pioglitazone (ACTOS) 15 MG tablet; Take 1 tablet by mouth daily, Disp-90 tablet, R-1Normal  -     Hemoglobin A1C; Future  -     Microalbumin / Creatinine Urine Ratio; Future  Endorses medication compliance, Refill provided, Follow up labs prior to next visit, Denies signs and symptoms of hyper or hypoglycemia, and Diabetes controlled, A1C <7  S/P BKA (below knee amputation), right ([facility])  Assessment & Plan:   Well-controlled, continue current treatment plan    Diabetic retinopathy associated with type 2 diabetes mellitus, macular edema presence unspecified, unspecified laterality, unspecified retinopathy severity ([facility])  Assessment & Plan:   Monitored by specialist- no acute findings meriting change in the

## 2024-01-29 NOTE — ACP (ADVANCE CARE PLANNING)
Advance Care Planning     General Advance Care Planning (ACP) Conversation    Date of Conversation: 1/29/2024  Conducted with: Patient with Decision Making Capacity    Healthcare Decision Maker:    Primary Decision Maker: Elliot Clark - [phone]  Click here to complete Healthcare Decision Makers including selection of the Healthcare Decision Maker Relationship (ie \"Primary\").  Today we documented desired Decision Maker(s), who is (are) NOT Legal Next of Kin. ACP documents are required for decision maker authority.    Content/Action Overview:  Has NO ACP documents/care preferences - information provided, considering goals and options  Reviewed DNR/DNI and patient elects Full Code (Attempt Resuscitation)  resuscitation preferences      Length of Voluntary ACP Conversation in minutes:  <16 minutes (Non-Billable)    Meena Yuen, APRN - CNP

## 2024-02-01 ENCOUNTER — HOSPITAL ENCOUNTER (OUTPATIENT)
Facility: HOSPITAL | Age: 84
Setting detail: SPECIMEN
Discharge: HOME | End: 2024-02-01
Payer: MEDICARE

## 2024-02-01 DIAGNOSIS — E87.5 HYPERKALEMIA: ICD-10-CM

## 2024-02-01 LAB
ANION GAP SERPL CALC-SCNC: 4 MMOL/L (ref 3–18)
BUN SERPL-MCNC: 43 MG/DL (ref 7–18)
BUN/CREAT SERPL: 33 (ref 12–20)
CALCIUM SERPL-MCNC: 9.1 MG/DL (ref 8.5–10.1)
CHLORIDE SERPL-SCNC: 110 MMOL/L (ref 100–111)
CO2 SERPL-SCNC: 23 MMOL/L (ref 21–32)
CREAT SERPL-MCNC: 1.32 MG/DL (ref 0.6–1.3)
GLUCOSE SERPL-MCNC: 104 MG/DL (ref 74–99)
POTASSIUM SERPL-SCNC: 5.5 MMOL/L (ref 3.5–5.5)
SODIUM SERPL-SCNC: 137 MMOL/L (ref 136–145)

## 2024-02-01 PROCEDURE — 36415 COLL VENOUS BLD VENIPUNCTURE: CPT

## 2024-02-01 PROCEDURE — 80048 BASIC METABOLIC PNL TOTAL CA: CPT

## 2024-05-21 DIAGNOSIS — I10 ESSENTIAL HYPERTENSION WITH GOAL BLOOD PRESSURE LESS THAN 130/80: ICD-10-CM

## 2024-05-21 RX ORDER — AMLODIPINE BESYLATE 10 MG/1
10 TABLET ORAL DAILY
Qty: 90 TABLET | Refills: 1 | Status: SHIPPED | OUTPATIENT
Start: 2024-05-21

## 2024-06-24 ENCOUNTER — OFFICE VISIT (OUTPATIENT)
Facility: CLINIC | Age: 84
End: 2024-06-24
Payer: MEDICARE

## 2024-06-24 VITALS
DIASTOLIC BLOOD PRESSURE: 61 MMHG | OXYGEN SATURATION: 96 % | TEMPERATURE: 98.2 F | BODY MASS INDEX: 25.48 KG/M2 | WEIGHT: 143.8 LBS | HEART RATE: 69 BPM | HEIGHT: 63 IN | SYSTOLIC BLOOD PRESSURE: 151 MMHG | RESPIRATION RATE: 18 BRPM

## 2024-06-24 DIAGNOSIS — E78.2 MIXED HYPERLIPIDEMIA: ICD-10-CM

## 2024-06-24 DIAGNOSIS — I10 ESSENTIAL HYPERTENSION WITH GOAL BLOOD PRESSURE LESS THAN 130/80: ICD-10-CM

## 2024-06-24 DIAGNOSIS — E11.29 TYPE 2 DIABETES MELLITUS WITH MICROALBUMINURIA, WITHOUT LONG-TERM CURRENT USE OF INSULIN (HCC): Primary | ICD-10-CM

## 2024-06-24 DIAGNOSIS — R80.9 TYPE 2 DIABETES MELLITUS WITH MICROALBUMINURIA, WITHOUT LONG-TERM CURRENT USE OF INSULIN (HCC): Primary | ICD-10-CM

## 2024-06-24 PROCEDURE — 3077F SYST BP >= 140 MM HG: CPT | Performed by: NURSE PRACTITIONER

## 2024-06-24 PROCEDURE — 99214 OFFICE O/P EST MOD 30 MIN: CPT | Performed by: NURSE PRACTITIONER

## 2024-06-24 PROCEDURE — 3044F HG A1C LEVEL LT 7.0%: CPT | Performed by: NURSE PRACTITIONER

## 2024-06-24 PROCEDURE — 1123F ACP DISCUSS/DSCN MKR DOCD: CPT | Performed by: NURSE PRACTITIONER

## 2024-06-24 PROCEDURE — 3078F DIAST BP <80 MM HG: CPT | Performed by: NURSE PRACTITIONER

## 2024-06-24 RX ORDER — LOSARTAN POTASSIUM 100 MG/1
100 TABLET ORAL DAILY
Qty: 90 TABLET | Refills: 1 | Status: SHIPPED | OUTPATIENT
Start: 2024-06-24

## 2024-06-24 SDOH — ECONOMIC STABILITY: INCOME INSECURITY: HOW HARD IS IT FOR YOU TO PAY FOR THE VERY BASICS LIKE FOOD, HOUSING, MEDICAL CARE, AND HEATING?: NOT HARD AT ALL

## 2024-06-24 SDOH — ECONOMIC STABILITY: FOOD INSECURITY: WITHIN THE PAST 12 MONTHS, THE FOOD YOU BOUGHT JUST DIDN'T LAST AND YOU DIDN'T HAVE MONEY TO GET MORE.: NEVER TRUE

## 2024-06-24 SDOH — ECONOMIC STABILITY: FOOD INSECURITY: WITHIN THE PAST 12 MONTHS, YOU WORRIED THAT YOUR FOOD WOULD RUN OUT BEFORE YOU GOT MONEY TO BUY MORE.: NEVER TRUE

## 2024-06-24 ASSESSMENT — PATIENT HEALTH QUESTIONNAIRE - PHQ9
SUM OF ALL RESPONSES TO PHQ QUESTIONS 1-9: 0
SUM OF ALL RESPONSES TO PHQ QUESTIONS 1-9: 0
SUM OF ALL RESPONSES TO PHQ9 QUESTIONS 1 & 2: 0
2. FEELING DOWN, DEPRESSED OR HOPELESS: NOT AT ALL
SUM OF ALL RESPONSES TO PHQ QUESTIONS 1-9: 0
1. LITTLE INTEREST OR PLEASURE IN DOING THINGS: NOT AT ALL
SUM OF ALL RESPONSES TO PHQ QUESTIONS 1-9: 0

## 2024-06-24 NOTE — PROGRESS NOTES
[address] [place] [address]               [phone]      Barry Stone is a 83 y.o. male and presents with Follow-up Chronic Condition, Hypertension, Diabetes, and Cholesterol Problem       Assessment/Plan:    1. Type 2 diabetes mellitus with microalbuminuria, without long-term current use of insulin (HCC)  -     Hemoglobin A1C; Future  -     Microalbumin / Creatinine Urine Ratio; Future  2. Essential hypertension with goal blood pressure less than 130/80  -     losartan (COZAAR) 100 MG tablet; Take 1 tablet by mouth daily, Disp-90 tablet, R-1Normal  -     Comprehensive Metabolic Panel; Future  -     CBC; Future  3. Mixed hyperlipidemia  -     Lipid Panel; Future  DM controlled A1C 6.9%   Blood pressure elevated in office and at home, change lisinopril to losartan 100mg   Lipid controlled LDL 67  Labs prior to next visit  Patient verbalized understanding and is in agreement with this plan of care      Follow up and disposition:   Return in about 4 weeks (around 7/22/2024) for NV, b/pcheck, AND, 3month, DM, HTN, HLD, 15min, office, w/labsprior.      Subjective:    Labs obtained prior to visit? No  Reviewed with patient? N/A    DMII-   Patient reports medication compliance Yes  Diabetic diet compliance frequently  Patient monitors blood sugars regularly frequently   Home glucose readings: 140   Denies hypoglycemic episodes Yes  Denies polyuria, polydipsia, paraesthesia, vision changes? Yes  Engaging in daily exercise?  Walking 30-60 min 6 days a week, slow walk   There are no preventive care reminders to display for this patient.  Lab Results   Component Value Date/Time    LABA1C 6.9 01/22/2024 01:01 PM   ]  Lab Results   Component Value Date/Time    MALBCR 128 01/22/2024 01:01 PM   ]  Diabetic medications:   Diabetic Medications       Biguanides       metFORMIN (GLUCOPHAGE) 1000 MG tablet TAKE 1 TABLET BY MOUTH EVERY MORNING AND EVENING WITH MEAL       Insulin Sensitizing Agents

## 2024-06-24 NOTE — PROGRESS NOTES
Room 7      Barry Stone had concerns including Follow-up Chronic Condition, Hypertension, Diabetes, and Cholesterol Problem. for today's visit .     Did patient bring someone? Yes son     Did the patient have DME equipment? NO     Did you take your medication today? PT states yes       1. \"Have you been to the ER, urgent care clinic since your last visit?  Hospitalized since your last visit?\" .NO    2. \"Have you seen or consulted any other health care providers outside of the [facility] since your last visit?\" No     3. For patients aged 45-75: Has the patient had a colonoscopy / FIT/ Cologuard? N/A      If the patient is female:    4. For patients aged 40-74: Has the patient had a mammogram within the past 2 years? N/A      5. For patients aged 21-65: Has the patient had a pap smear? {Cancer Care Gap present? N/A          6/24/2024     3:09 PM   Amb Fall Risk Assessment and TUG Test   Do you feel unsteady or are you worried about falling?  no   2 or more falls in past year? no   Fall with injury in past year? no              6/24/2024     3:09 PM   PHQ-9    Little interest or pleasure in doing things 0   Feeling down, depressed, or hopeless 0   PHQ-2 Score 0   PHQ-9 Total Score 0          Health Maintenance Due   Topic Date Due    Respiratory Syncytial Virus (RSV) Pregnant or age 60 yrs+ (1 - 1-dose 60+ series) Never done    Shingles vaccine (2 of 3) 07/31/2000    Pneumococcal 65+ years Vaccine (2 of 2 - PPSV23 or PCV20) 11/22/2018    COVID-19 Vaccine (4 - 2023-24 season) 09/01/2023

## 2024-07-20 DIAGNOSIS — I10 ESSENTIAL HYPERTENSION WITH GOAL BLOOD PRESSURE LESS THAN 130/80: ICD-10-CM

## 2024-07-20 DIAGNOSIS — E11.29 TYPE 2 DIABETES MELLITUS WITH MICROALBUMINURIA, WITHOUT LONG-TERM CURRENT USE OF INSULIN (HCC): ICD-10-CM

## 2024-07-20 DIAGNOSIS — E78.2 MIXED HYPERLIPIDEMIA: ICD-10-CM

## 2024-07-20 DIAGNOSIS — R80.9 TYPE 2 DIABETES MELLITUS WITH MICROALBUMINURIA, WITHOUT LONG-TERM CURRENT USE OF INSULIN (HCC): ICD-10-CM

## 2024-07-22 RX ORDER — HYDROCHLOROTHIAZIDE 25 MG/1
25 TABLET ORAL DAILY
Qty: 90 TABLET | Refills: 1 | Status: SHIPPED | OUTPATIENT
Start: 2024-07-22 | End: 2024-07-24 | Stop reason: SDUPTHER

## 2024-07-22 RX ORDER — PIOGLITAZONEHYDROCHLORIDE 15 MG/1
15 TABLET ORAL DAILY
Qty: 90 TABLET | Refills: 1 | Status: SHIPPED | OUTPATIENT
Start: 2024-07-22

## 2024-07-22 RX ORDER — SIMVASTATIN 40 MG
40 TABLET ORAL NIGHTLY
Qty: 90 TABLET | Refills: 1 | Status: SHIPPED | OUTPATIENT
Start: 2024-07-22 | End: 2024-07-24 | Stop reason: SDUPTHER

## 2024-07-24 ENCOUNTER — NURSE ONLY (OUTPATIENT)
Facility: CLINIC | Age: 84
End: 2024-07-24

## 2024-07-24 VITALS
OXYGEN SATURATION: 96 % | HEART RATE: 73 BPM | DIASTOLIC BLOOD PRESSURE: 66 MMHG | WEIGHT: 142 LBS | HEIGHT: 63 IN | SYSTOLIC BLOOD PRESSURE: 132 MMHG | RESPIRATION RATE: 18 BRPM | TEMPERATURE: 98 F | BODY MASS INDEX: 25.16 KG/M2

## 2024-07-24 DIAGNOSIS — I10 ESSENTIAL HYPERTENSION WITH GOAL BLOOD PRESSURE LESS THAN 130/80: ICD-10-CM

## 2024-07-24 DIAGNOSIS — E78.2 MIXED HYPERLIPIDEMIA: ICD-10-CM

## 2024-07-24 RX ORDER — HYDROCHLOROTHIAZIDE 25 MG/1
25 TABLET ORAL DAILY
Qty: 90 TABLET | Refills: 1 | Status: SHIPPED | OUTPATIENT
Start: 2024-07-24

## 2024-07-24 RX ORDER — ASPIRIN 81 MG/1
81 TABLET ORAL DAILY
Qty: 90 TABLET | Refills: 1 | Status: SHIPPED | OUTPATIENT
Start: 2024-07-24

## 2024-07-24 RX ORDER — SIMVASTATIN 40 MG
40 TABLET ORAL NIGHTLY
Qty: 90 TABLET | Refills: 1 | Status: SHIPPED | OUTPATIENT
Start: 2024-07-24

## 2024-07-24 NOTE — PROGRESS NOTES
Per Gavin Yuen instructions patient presents today for Blood Pressure check. Patient seated and resting 15 min with both feet flat on the floor. Blood pressure taking and documented . Reported blood pressure to GAVIN Yuen.      Vitals:    07/24/24 1528   BP: 132/66   Pulse: 73   Resp: 18   Temp: 98 °F (36.7 °C)   SpO2: 96%

## 2024-07-25 RX ORDER — LISINOPRIL 40 MG/1
TABLET ORAL
Qty: 90 TABLET | Refills: 1 | OUTPATIENT
Start: 2024-07-25

## 2024-09-23 LAB
A/G RATIO: 1.5 RATIO (ref 1.1–2.6)
ALBUMIN: 4.4 G/DL (ref 3.5–5)
ALP BLD-CCNC: 88 U/L (ref 40–125)
ALT SERPL-CCNC: 12 U/L (ref 5–40)
ANION GAP SERPL CALCULATED.3IONS-SCNC: 10 MMOL/L (ref 3–15)
AST SERPL-CCNC: 16 U/L (ref 10–37)
BILIRUB SERPL-MCNC: 0.2 MG/DL (ref 0.2–1.2)
BUN BLDV-MCNC: 44 MG/DL (ref 6–22)
CALCIUM SERPL-MCNC: 10.1 MG/DL (ref 8.4–10.5)
CHLORIDE BLD-SCNC: 108 MMOL/L (ref 98–110)
CHOLESTEROL, TOTAL: 145 MG/DL (ref 110–200)
CHOLESTEROL/HDL RATIO: 2 (ref 0–5)
CO2: 26 MMOL/L (ref 20–32)
CREAT SERPL-MCNC: 1.3 MG/DL (ref 0.8–1.6)
CREATININE URINE: 62 MG/DL
CREATININE URINE: 66 MG/DL
ESTIMATED AVERAGE GLUCOSE: 165 MG/DL (ref 91–123)
GFR, ESTIMATED: 52.4 ML/MIN/1.73 SQ.M.
GLOBULIN: 2.9 G/DL (ref 2–4)
GLUCOSE: 112 MG/DL (ref 70–99)
HBA1C MFR BLD: 7.4 % (ref 4.8–5.6)
HCT VFR BLD CALC: 32.7 % (ref 37.8–52.2)
HDLC SERPL-MCNC: 71 MG/DL
HEMOGLOBIN: 10.6 G/DL (ref 12.6–17.1)
LDL CHOLESTEROL: 57 MG/DL (ref 50–99)
LDL/HDL RATIO: 0.8
MCH RBC QN AUTO: 33 PG (ref 26–34)
MCHC RBC AUTO-ENTMCNC: 32 G/DL (ref 31–36)
MCV RBC AUTO: 100 FL (ref 80–95)
MICROALB/CREAT RATIO (UG/MG CREAT.): 92.4 (ref 0–30)
MICROALBUMIN/CREAT 24H UR: 57.3 MG/L (ref 0.1–17)
NON-HDL CHOLESTEROL: 74 MG/DL
PDW BLD-RTO: 12.9 % (ref 10–15.5)
PLATELET # BLD: 220 K/UL (ref 140–440)
PMV BLD AUTO: 10 FL (ref 9–13)
POTASSIUM SERPL-SCNC: 5.2 MMOL/L (ref 3.5–5.5)
RBC # BLD: 3.26 M/UL (ref 3.8–5.8)
SODIUM BLD-SCNC: 144 MMOL/L (ref 133–145)
TOTAL PROTEIN: 7.3 G/DL (ref 6.2–8.1)
TRIGL SERPL-MCNC: 83 MG/DL (ref 40–149)
VLDLC SERPL CALC-MCNC: 17 MG/DL (ref 8–30)
WBC # BLD: 5.4 K/UL (ref 4–11)

## 2024-10-01 ENCOUNTER — OFFICE VISIT (OUTPATIENT)
Facility: CLINIC | Age: 84
End: 2024-10-01
Payer: MEDICARE

## 2024-10-01 VITALS
HEIGHT: 63 IN | BODY MASS INDEX: 25.02 KG/M2 | TEMPERATURE: 98.1 F | HEART RATE: 72 BPM | OXYGEN SATURATION: 97 % | SYSTOLIC BLOOD PRESSURE: 138 MMHG | DIASTOLIC BLOOD PRESSURE: 58 MMHG | WEIGHT: 141.2 LBS | RESPIRATION RATE: 18 BRPM

## 2024-10-01 DIAGNOSIS — R80.9 TYPE 2 DIABETES MELLITUS WITH MICROALBUMINURIA, WITHOUT LONG-TERM CURRENT USE OF INSULIN (HCC): Primary | ICD-10-CM

## 2024-10-01 DIAGNOSIS — E78.2 MIXED HYPERLIPIDEMIA: ICD-10-CM

## 2024-10-01 DIAGNOSIS — E53.8 B12 DEFICIENCY: ICD-10-CM

## 2024-10-01 DIAGNOSIS — I10 ESSENTIAL HYPERTENSION WITH GOAL BLOOD PRESSURE LESS THAN 130/80: ICD-10-CM

## 2024-10-01 DIAGNOSIS — Z23 ENCOUNTER FOR IMMUNIZATION: ICD-10-CM

## 2024-10-01 DIAGNOSIS — D50.9 IRON DEFICIENCY ANEMIA, UNSPECIFIED IRON DEFICIENCY ANEMIA TYPE: ICD-10-CM

## 2024-10-01 DIAGNOSIS — E11.29 TYPE 2 DIABETES MELLITUS WITH MICROALBUMINURIA, WITHOUT LONG-TERM CURRENT USE OF INSULIN (HCC): Primary | ICD-10-CM

## 2024-10-01 PROCEDURE — 99214 OFFICE O/P EST MOD 30 MIN: CPT | Performed by: NURSE PRACTITIONER

## 2024-10-01 PROCEDURE — 90653 IIV ADJUVANT VACCINE IM: CPT | Performed by: NURSE PRACTITIONER

## 2024-10-01 PROCEDURE — 3051F HG A1C>EQUAL 7.0%<8.0%: CPT | Performed by: NURSE PRACTITIONER

## 2024-10-01 PROCEDURE — G0008 ADMIN INFLUENZA VIRUS VAC: HCPCS | Performed by: NURSE PRACTITIONER

## 2024-10-01 PROCEDURE — 3078F DIAST BP <80 MM HG: CPT | Performed by: NURSE PRACTITIONER

## 2024-10-01 PROCEDURE — 1123F ACP DISCUSS/DSCN MKR DOCD: CPT | Performed by: NURSE PRACTITIONER

## 2024-10-01 PROCEDURE — 3075F SYST BP GE 130 - 139MM HG: CPT | Performed by: NURSE PRACTITIONER

## 2024-10-01 RX ORDER — PIOGLITAZONEHYDROCHLORIDE 15 MG/1
15 TABLET ORAL DAILY
Qty: 90 TABLET | Refills: 1 | Status: SHIPPED | OUTPATIENT
Start: 2024-10-01

## 2024-10-01 RX ORDER — HYDROCHLOROTHIAZIDE 25 MG/1
25 TABLET ORAL DAILY
Qty: 90 TABLET | Refills: 1 | Status: SHIPPED | OUTPATIENT
Start: 2024-10-01

## 2024-10-01 RX ORDER — FERROUS FUMARATE 324(106)MG
1 TABLET ORAL 2 TIMES DAILY
Qty: 180 TABLET | Refills: 1 | Status: SHIPPED | OUTPATIENT
Start: 2024-10-01

## 2024-10-01 RX ORDER — SIMVASTATIN 40 MG
40 TABLET ORAL NIGHTLY
Qty: 90 TABLET | Refills: 1 | Status: SHIPPED | OUTPATIENT
Start: 2024-10-01

## 2024-10-01 ASSESSMENT — PATIENT HEALTH QUESTIONNAIRE - PHQ9
2. FEELING DOWN, DEPRESSED OR HOPELESS: NOT AT ALL
1. LITTLE INTEREST OR PLEASURE IN DOING THINGS: NOT AT ALL
SUM OF ALL RESPONSES TO PHQ QUESTIONS 1-9: 0
SUM OF ALL RESPONSES TO PHQ9 QUESTIONS 1 & 2: 0

## 2024-10-01 NOTE — PROGRESS NOTES
Room 7        Barry Stone had concerns including Follow-up Chronic Condition, Hypertension, Diabetes, and Cholesterol Problem. for today's visit .       Patient presents to office for Fluad  injection. Injection ordered per smart set and pended  for Mal Carmichael CMA to sign. After obtaining signed consent from patient area was prepped and injection given IM. No signs nor symptoms of adverse reaction noted. Patient tolerated injection well.    1. \"Have you been to the ER, urgent care clinic since your last visit?  Hospitalized since your last visit?\" .NO    2. \"Have you seen or consulted any other health care providers outside of the [facility] since your last visit?\" No     3. For patients aged 45-75: Has the patient had a colonoscopy / FIT/ Cologuard? N/A      If the patient is female:    4. For patients aged 40-74: Has the patient had a mammogram within the past 2 years? N/A      5. For patients aged 21-65: Has the patient had a pap smear? {Cancer Care Gap present? N/A            10/1/2024     3:06 PM   Amb Fall Risk Assessment and TUG Test   Do you feel unsteady or are you worried about falling?  no   2 or more falls in past year? no   Fall with injury in past year? no              10/1/2024     3:06 PM   PHQ-9    Little interest or pleasure in doing things 0   Feeling down, depressed, or hopeless 0   PHQ-2 Score 0   PHQ-9 Total Score 0          Health Maintenance Due   Topic Date Due    Respiratory Syncytial Virus (RSV) Pregnant or age 60 yrs+ (1 - 1-dose 60+ series) Never done    Shingles vaccine (2 of 3) 07/31/2000    Pneumococcal 65+ years Vaccine (2 of 2 - PPSV23 or PCV20) 11/22/2018    Flu vaccine (1) 08/01/2024    COVID-19 Vaccine (4 - 2023-24 season) 09/01/2024

## 2024-10-01 NOTE — PROGRESS NOTES
[address] [place] [address]               [phone]      Barry Stone is a 84 y.o. male and presents with Follow-up Chronic Condition, Hypertension, Diabetes, and Cholesterol Problem       Assessment/Plan:    1. Type 2 diabetes mellitus with microalbuminuria, without long-term current use of insulin (HCC)  -     metFORMIN (GLUCOPHAGE) 1000 MG tablet; TAKE 1 TABLET BY MOUTH EVERY MORNING AND EVENING WITH MEAL, Disp-180 tablet, R-1Normal  -     pioglitazone (ACTOS) 15 MG tablet; Take 1 tablet by mouth daily, Disp-90 tablet, R-1Normal  -     Hemoglobin A1C; Future  -     Microalbumin / Creatinine Urine Ratio; Future  2. Mixed hyperlipidemia  -     simvastatin (ZOCOR) 40 MG tablet; Take 1 tablet by mouth nightly, Disp-90 tablet, R-1Normal  -     Lipid Panel; Future  3. Essential hypertension with goal blood pressure less than 130/80  -     hydroCHLOROthiazide (HYDRODIURIL) 25 MG tablet; Take 1 tablet by mouth daily, Disp-90 tablet, R-1Normal  -     Comprehensive Metabolic Panel; Future  4. Encounter for immunization  -     Influenza, FLUAD Trivalent, (age 65 y+), IM, Preservative Free, 0.5mL  -     THER/PROPH/DIAG INJECTION, SUBCUT/IM  5. Iron deficiency anemia, unspecified iron deficiency anemia type  -     Ferrous Fumarate (FERROCITE) 324 (106 Fe) MG TABS; Take 1 tablet by mouth 2 times daily, Disp-180 tablet, R-1Normal  -     CBC; Future  6. B12 deficiency  -     Vitamin B12; Future  DM controlled, A1C <8%  Lipids controlled, LDL 57  Blood pressure controlled, continue amlodipine, hctz, losartan at same dose  Refills provided  Health maintenance addressed  Labs prior to next visit  Patient verbalized understanding and is in agreement with this plan of care        Follow up and disposition:   Return in about 4 months (around 2/1/2025) for MAWV, DM, HTN, HLD, 30min, office, w/labsprior.      Subjective:    Labs obtained prior to visit? Yes  Reviewed with patient? yes    DMII-

## 2024-10-20 DIAGNOSIS — D50.9 IRON DEFICIENCY ANEMIA, UNSPECIFIED IRON DEFICIENCY ANEMIA TYPE: ICD-10-CM

## 2024-10-21 RX ORDER — FERROUS FUMARATE 324(106)MG
1 TABLET ORAL 2 TIMES DAILY
Qty: 180 TABLET | Refills: 1 | Status: SHIPPED | OUTPATIENT
Start: 2024-10-21

## 2024-10-31 DIAGNOSIS — I10 ESSENTIAL HYPERTENSION WITH GOAL BLOOD PRESSURE LESS THAN 130/80: ICD-10-CM

## 2024-10-31 RX ORDER — AMLODIPINE BESYLATE 10 MG/1
10 TABLET ORAL DAILY
Qty: 90 TABLET | Refills: 1 | Status: SHIPPED | OUTPATIENT
Start: 2024-10-31

## 2024-12-05 DIAGNOSIS — I10 ESSENTIAL HYPERTENSION WITH GOAL BLOOD PRESSURE LESS THAN 130/80: ICD-10-CM

## 2024-12-06 RX ORDER — LOSARTAN POTASSIUM 100 MG/1
100 TABLET ORAL DAILY
Qty: 90 TABLET | Refills: 1 | Status: SHIPPED | OUTPATIENT
Start: 2024-12-06

## 2025-02-03 ENCOUNTER — HOSPITAL ENCOUNTER (OUTPATIENT)
Facility: HOSPITAL | Age: 85
Setting detail: SPECIMEN
Discharge: HOME | End: 2025-02-06
Payer: MEDICARE

## 2025-02-03 DIAGNOSIS — E53.8 B12 DEFICIENCY: ICD-10-CM

## 2025-02-03 DIAGNOSIS — I10 ESSENTIAL HYPERTENSION WITH GOAL BLOOD PRESSURE LESS THAN 130/80: ICD-10-CM

## 2025-02-03 DIAGNOSIS — E11.29 TYPE 2 DIABETES MELLITUS WITH MICROALBUMINURIA, WITHOUT LONG-TERM CURRENT USE OF INSULIN (HCC): ICD-10-CM

## 2025-02-03 DIAGNOSIS — E78.2 MIXED HYPERLIPIDEMIA: ICD-10-CM

## 2025-02-03 DIAGNOSIS — D50.9 IRON DEFICIENCY ANEMIA, UNSPECIFIED IRON DEFICIENCY ANEMIA TYPE: ICD-10-CM

## 2025-02-03 DIAGNOSIS — R80.9 TYPE 2 DIABETES MELLITUS WITH MICROALBUMINURIA, WITHOUT LONG-TERM CURRENT USE OF INSULIN (HCC): ICD-10-CM

## 2025-02-03 LAB
ALBUMIN SERPL-MCNC: 4.1 G/DL (ref 3.4–5)
ALBUMIN/GLOB SERPL: 1.1 (ref 0.8–1.7)
ALP SERPL-CCNC: 100 U/L (ref 45–117)
ALT SERPL-CCNC: 25 U/L (ref 16–61)
ANION GAP SERPL CALC-SCNC: 4 MMOL/L (ref 3–18)
AST SERPL-CCNC: 17 U/L (ref 10–38)
BILIRUB SERPL-MCNC: 0.3 MG/DL (ref 0.2–1)
BUN SERPL-MCNC: 34 MG/DL (ref 7–18)
BUN/CREAT SERPL: 27 (ref 12–20)
CALCIUM SERPL-MCNC: 9.3 MG/DL (ref 8.5–10.1)
CHLORIDE SERPL-SCNC: 107 MMOL/L (ref 100–111)
CHOLEST SERPL-MCNC: 168 MG/DL
CO2 SERPL-SCNC: 28 MMOL/L (ref 21–32)
CREAT SERPL-MCNC: 1.25 MG/DL (ref 0.6–1.3)
CREAT UR-MCNC: 85 MG/DL (ref 30–125)
ERYTHROCYTE [DISTWIDTH] IN BLOOD BY AUTOMATED COUNT: 12.7 % (ref 11.6–14.5)
EST. AVERAGE GLUCOSE BLD GHB EST-MCNC: 166 MG/DL
GLOBULIN SER CALC-MCNC: 3.7 G/DL (ref 2–4)
GLUCOSE SERPL-MCNC: 110 MG/DL (ref 74–99)
HBA1C MFR BLD: 7.4 % (ref 4.2–5.6)
HCT VFR BLD AUTO: 35.7 % (ref 36–48)
HDLC SERPL-MCNC: 88 MG/DL (ref 40–60)
HDLC SERPL: 1.9 (ref 0–5)
HGB BLD-MCNC: 11.3 G/DL (ref 13–16)
LDLC SERPL CALC-MCNC: 63.2 MG/DL (ref 0–100)
LIPID PANEL: ABNORMAL
MCH RBC QN AUTO: 32.3 PG (ref 24–34)
MCHC RBC AUTO-ENTMCNC: 31.7 G/DL (ref 31–37)
MCV RBC AUTO: 102 FL (ref 78–100)
MICROALBUMIN UR-MCNC: 26.2 MG/DL (ref 0–3)
MICROALBUMIN/CREAT UR-RTO: 308 MG/G (ref 0–30)
NRBC # BLD: 0 K/UL (ref 0–0.01)
NRBC BLD-RTO: 0 PER 100 WBC
PLATELET # BLD AUTO: 220 K/UL (ref 135–420)
PMV BLD AUTO: 10.8 FL (ref 9.2–11.8)
POTASSIUM SERPL-SCNC: 4.6 MMOL/L (ref 3.5–5.5)
PROT SERPL-MCNC: 7.8 G/DL (ref 6.4–8.2)
RBC # BLD AUTO: 3.5 M/UL (ref 4.35–5.65)
SODIUM SERPL-SCNC: 139 MMOL/L (ref 136–145)
TRIGL SERPL-MCNC: 84 MG/DL
VIT B12 SERPL-MCNC: >2000 PG/ML (ref 211–911)
VLDLC SERPL CALC-MCNC: 16.8 MG/DL
WBC # BLD AUTO: 5.6 K/UL (ref 4.6–13.2)

## 2025-02-03 PROCEDURE — 82570 ASSAY OF URINE CREATININE: CPT

## 2025-02-03 PROCEDURE — 82043 UR ALBUMIN QUANTITATIVE: CPT

## 2025-02-03 PROCEDURE — 80061 LIPID PANEL: CPT

## 2025-02-03 PROCEDURE — 80053 COMPREHEN METABOLIC PANEL: CPT

## 2025-02-03 PROCEDURE — 83036 HEMOGLOBIN GLYCOSYLATED A1C: CPT

## 2025-02-03 PROCEDURE — 36415 COLL VENOUS BLD VENIPUNCTURE: CPT

## 2025-02-03 PROCEDURE — 85027 COMPLETE CBC AUTOMATED: CPT

## 2025-02-03 PROCEDURE — 82607 VITAMIN B-12: CPT

## 2025-02-07 SDOH — ECONOMIC STABILITY: FOOD INSECURITY: WITHIN THE PAST 12 MONTHS, THE FOOD YOU BOUGHT JUST DIDN'T LAST AND YOU DIDN'T HAVE MONEY TO GET MORE.: NEVER TRUE

## 2025-02-07 SDOH — HEALTH STABILITY: PHYSICAL HEALTH: ON AVERAGE, HOW MANY DAYS PER WEEK DO YOU ENGAGE IN MODERATE TO STRENUOUS EXERCISE (LIKE A BRISK WALK)?: 2 DAYS

## 2025-02-07 SDOH — ECONOMIC STABILITY: FOOD INSECURITY: WITHIN THE PAST 12 MONTHS, YOU WORRIED THAT YOUR FOOD WOULD RUN OUT BEFORE YOU GOT MONEY TO BUY MORE.: NEVER TRUE

## 2025-02-07 SDOH — HEALTH STABILITY: PHYSICAL HEALTH: ON AVERAGE, HOW MANY MINUTES DO YOU ENGAGE IN EXERCISE AT THIS LEVEL?: 10 MIN

## 2025-02-07 SDOH — ECONOMIC STABILITY: INCOME INSECURITY: IN THE LAST 12 MONTHS, WAS THERE A TIME WHEN YOU WERE NOT ABLE TO PAY THE MORTGAGE OR RENT ON TIME?: NO

## 2025-02-07 ASSESSMENT — PATIENT HEALTH QUESTIONNAIRE - PHQ9
2. FEELING DOWN, DEPRESSED OR HOPELESS: NOT AT ALL
SUM OF ALL RESPONSES TO PHQ QUESTIONS 1-9: 0
SUM OF ALL RESPONSES TO PHQ9 QUESTIONS 1 & 2: 0
SUM OF ALL RESPONSES TO PHQ QUESTIONS 1-9: 0
SUM OF ALL RESPONSES TO PHQ QUESTIONS 1-9: 0
1. LITTLE INTEREST OR PLEASURE IN DOING THINGS: NOT AT ALL
SUM OF ALL RESPONSES TO PHQ QUESTIONS 1-9: 0

## 2025-02-07 ASSESSMENT — LIFESTYLE VARIABLES
HOW OFTEN DO YOU HAVE A DRINK CONTAINING ALCOHOL: 1
HOW MANY STANDARD DRINKS CONTAINING ALCOHOL DO YOU HAVE ON A TYPICAL DAY: PATIENT DOES NOT DRINK
HOW OFTEN DO YOU HAVE SIX OR MORE DRINKS ON ONE OCCASION: 1
HOW OFTEN DO YOU HAVE A DRINK CONTAINING ALCOHOL: NEVER
HOW MANY STANDARD DRINKS CONTAINING ALCOHOL DO YOU HAVE ON A TYPICAL DAY: 0

## 2025-02-10 ENCOUNTER — OFFICE VISIT (OUTPATIENT)
Facility: CLINIC | Age: 85
End: 2025-02-10

## 2025-02-10 VITALS
RESPIRATION RATE: 16 BRPM | BODY MASS INDEX: 25.3 KG/M2 | OXYGEN SATURATION: 97 % | WEIGHT: 142.8 LBS | HEIGHT: 63 IN | DIASTOLIC BLOOD PRESSURE: 52 MMHG | TEMPERATURE: 97.2 F | HEART RATE: 74 BPM | SYSTOLIC BLOOD PRESSURE: 144 MMHG

## 2025-02-10 DIAGNOSIS — Z00.00 MEDICARE ANNUAL WELLNESS VISIT, SUBSEQUENT: Primary | ICD-10-CM

## 2025-02-10 DIAGNOSIS — R80.9 TYPE 2 DIABETES MELLITUS WITH MICROALBUMINURIA, WITHOUT LONG-TERM CURRENT USE OF INSULIN (HCC): ICD-10-CM

## 2025-02-10 DIAGNOSIS — I10 ESSENTIAL HYPERTENSION WITH GOAL BLOOD PRESSURE LESS THAN 130/80: ICD-10-CM

## 2025-02-10 DIAGNOSIS — D50.9 IRON DEFICIENCY ANEMIA, UNSPECIFIED IRON DEFICIENCY ANEMIA TYPE: ICD-10-CM

## 2025-02-10 DIAGNOSIS — E11.29 TYPE 2 DIABETES MELLITUS WITH MICROALBUMINURIA, WITHOUT LONG-TERM CURRENT USE OF INSULIN (HCC): ICD-10-CM

## 2025-02-10 DIAGNOSIS — Z71.89 ADVANCED CARE PLANNING/COUNSELING DISCUSSION: ICD-10-CM

## 2025-02-10 DIAGNOSIS — E78.2 MIXED HYPERLIPIDEMIA: ICD-10-CM

## 2025-02-10 DIAGNOSIS — Z89.511 S/P BKA (BELOW KNEE AMPUTATION), RIGHT (HCC): ICD-10-CM

## 2025-02-10 PROBLEM — N18.31 STAGE 3A CHRONIC KIDNEY DISEASE (HCC): Status: ACTIVE | Noted: 2025-02-10

## 2025-02-10 RX ORDER — FERROUS FUMARATE 324(106)MG
1 TABLET ORAL 2 TIMES DAILY
Qty: 180 TABLET | Refills: 1 | Status: SHIPPED | OUTPATIENT
Start: 2025-02-10

## 2025-02-10 NOTE — ACP (ADVANCE CARE PLANNING)
Advance Care Planning   General Advance Care Planning (ACP) Conversation    Date of Conversation: 2/10/2025  Conducted with: Patient with Decision Making Capacity  Other persons present: Son Elliot Stone    Healthcare Decision Maker:    Primary Decision Maker: Elliot Clark - Child - [phone]    Today we documented Decision Maker(s) consistent with Legal Next of Kin hierarchy.  Content/Action Overview:  Has ACP document(s) NOT on file - requested patient to provide  Reviewed DNR/DNI and patient elects Full Code (Attempt Resuscitation)  resuscitation preferences      Length of Voluntary ACP Conversation in minutes:  <16 minutes (Non-Billable)    Meena Yuen, APRN - CNP

## 2025-02-10 NOTE — PROGRESS NOTES
Room 8    Barry Stone had concerns including Medicare AWV, Follow-up Chronic Condition, Diabetes, Cholesterol Problem, and Hypertension. for today's visit .         Did you take your medication today? Patient son states yes       1. \"Have you been to the ER, urgent care clinic since your last visit?  Hospitalized since your last visit?\" .NO    2. \"Have you seen or consulted any other health care providers outside of the [facility] since your last visit?\" NO     3. For patients aged 45-75: Has the patient had a colonoscopy / FIT/ Cologuard? N/A      If the patient is female:    4. For patients aged 40-74: Has the patient had a mammogram within the past 2 years? N/A      5. For patients aged 21-65: Has the patient had a pap smear? {Cancer Care Gap present? N/A              2/7/2025     4:54 PM   PHQ-9    Little interest or pleasure in doing things 0   Feeling down, depressed, or hopeless 0   PHQ-2 Score 0   PHQ-9 Total Score 0          Health Maintenance Due   Topic Date Due    Shingles vaccine (2 of 3) 07/31/2000    Respiratory Syncytial Virus (RSV) Pregnant or age 60 yrs+ (1 - 1-dose 75+ series) Never done    Pneumococcal 50+ years Vaccine (2 of 2 - PPSV23) 11/22/2018    COVID-19 Vaccine (4 - 2024-25 season) 09/01/2024    Annual Wellness Visit (Medicare Advantage)  01/01/2025

## 2025-02-10 NOTE — PATIENT INSTRUCTIONS
Learning About Being Active as an Older Adult  Why is being active important as you get older?     Being active is one of the best things you can do for your health. And it's never too late to start. Being active--or getting active, if you aren't already--has definite benefits. It can:  Give you more energy,  Keep your mind sharp.  Improve balance to reduce your risk of falls.  Help you manage chronic illness with fewer medicines.  No matter how old you are, how fit you are, or what health problems you have, there is a form of activity that will work for you. And the more physical activity you can do, the better your overall health will be.  What kinds of activity can help you stay healthy?  Being more active will make your daily activities easier. Physical activity includes planned exercise and things you do in daily life. There are four types of activity:  Aerobic.  Doing aerobic activity makes your heart and lungs strong.  Includes walking, dancing, and gardening.  Aim for at least 2½ hours spread throughout the week.  It improves your energy and can help you sleep better.  Muscle-strengthening.  This type of activity can help maintain muscle and strengthen bones.  Includes climbing stairs, using resistance bands, and lifting or carrying heavy loads.  Aim for at least twice a week.  It can help protect the knees and other joints.  Stretching.  Stretching gives you better range of motion in joints and muscles.  Includes upper arm stretches, calf stretches, and gentle yoga.  Aim for at least twice a week, preferably after your muscles are warmed up from other activities.  It can help you function better in daily life.  Balancing.  This helps you stay coordinated and have good posture.  Includes heel-to-toe walking, yoshi chi, and certain types of yoga.  Aim for at least 3 days a week.  It can reduce your risk of falling.  Even if you have a hard time meeting the recommendations, it's better to be more active

## 2025-02-10 NOTE — PROGRESS NOTES
Medicare Annual Wellness Visit    Barry Stone is here for Medicare AWV, Follow-up Chronic Condition, Diabetes, Cholesterol Problem, and Hypertension    Assessment & Plan   Medicare annual wellness visit, subsequent  Advanced care planning/counseling discussion  Type 2 diabetes mellitus with microalbuminuria, without long-term current use of insulin (HCC)  -     metFORMIN (GLUCOPHAGE) 1000 MG tablet; TAKE 1 TABLET BY MOUTH EVERY MORNING AND EVENING WITH MEAL, Disp-180 tablet, R-1Normal  Essential hypertension with goal blood pressure less than 130/80  Mixed hyperlipidemia  Iron deficiency anemia, unspecified iron deficiency anemia type  -     Ferrous Fumarate (FERROCITE) 324 (106 Fe) MG TABS; Take 1 tablet by mouth 2 times daily, Disp-180 tablet, R-1Normal  S/P BKA (below knee amputation), right (HCC)  Assessment & Plan:   Chronic, at goal (stable), continue current treatment plan  MAWV completed, HC decision maker verified, ACP discussion completed  DM controlled, A1C <8%  Blood pressure controlled for his age, continue losartan, amlodipine and hctz at same dose  Lipids controlled, LDL <70  Refills provided  Labs prior to next visit  Patient verbalized understanding and is in agreement with this plan of care       Return in about 4 months (around 6/10/2025) for DM, DMfoot, HTN, HLD, 30min, office, w/labsprior.     Subjective   The following acute and/or chronic problems were also addressed today:  Labs prior? Yes  Reviewed with patient? Yes    DMII-   Patient reports medication compliance Yes  Diabetic diet compliance frequently  Patient monitors blood sugars regularly never   Home glucose readings: does not check   Denies hypoglycemic episodes Yes  Denies polyuria, polydipsia, paraesthesia, vision changes? Yes  Engaging in daily exercise? Routine   There are no preventive care reminders to display for this patient.  Lab Results   Component Value Date/Time    LABA1C 7.4 02/03/2025 03:00 PM   ]  Lab Results

## 2025-03-06 DIAGNOSIS — R80.9 TYPE 2 DIABETES MELLITUS WITH MICROALBUMINURIA, WITHOUT LONG-TERM CURRENT USE OF INSULIN (HCC): ICD-10-CM

## 2025-03-06 DIAGNOSIS — E11.29 TYPE 2 DIABETES MELLITUS WITH MICROALBUMINURIA, WITHOUT LONG-TERM CURRENT USE OF INSULIN (HCC): ICD-10-CM

## 2025-03-06 DIAGNOSIS — E78.2 MIXED HYPERLIPIDEMIA: ICD-10-CM

## 2025-03-06 RX ORDER — PIOGLITAZONE 15 MG/1
15 TABLET ORAL DAILY
Qty: 90 TABLET | Refills: 1 | Status: SHIPPED | OUTPATIENT
Start: 2025-03-06

## 2025-03-06 RX ORDER — SIMVASTATIN 40 MG
40 TABLET ORAL NIGHTLY
Qty: 90 TABLET | Refills: 1 | Status: SHIPPED | OUTPATIENT
Start: 2025-03-06

## 2025-03-13 DIAGNOSIS — I10 ESSENTIAL HYPERTENSION WITH GOAL BLOOD PRESSURE LESS THAN 130/80: ICD-10-CM

## 2025-03-13 RX ORDER — AMLODIPINE BESYLATE 10 MG/1
10 TABLET ORAL DAILY
Qty: 90 TABLET | Refills: 1 | Status: SHIPPED | OUTPATIENT
Start: 2025-03-13

## 2025-03-13 RX ORDER — LOSARTAN POTASSIUM 100 MG/1
100 TABLET ORAL DAILY
Qty: 90 TABLET | Refills: 1 | Status: SHIPPED | OUTPATIENT
Start: 2025-03-13

## 2025-03-13 RX ORDER — HYDROCHLOROTHIAZIDE 25 MG/1
25 TABLET ORAL DAILY
Qty: 90 TABLET | Refills: 1 | Status: SHIPPED | OUTPATIENT
Start: 2025-03-13

## 2025-04-20 DIAGNOSIS — D50.9 IRON DEFICIENCY ANEMIA, UNSPECIFIED IRON DEFICIENCY ANEMIA TYPE: ICD-10-CM

## 2025-04-21 RX ORDER — FERROUS FUMARATE 324(106)MG
1 TABLET ORAL 2 TIMES DAILY
Qty: 180 TABLET | Refills: 1 | Status: SHIPPED | OUTPATIENT
Start: 2025-04-21

## 2025-06-03 ENCOUNTER — HOSPITAL ENCOUNTER (OUTPATIENT)
Facility: HOSPITAL | Age: 85
Setting detail: SPECIMEN
Discharge: HOME | End: 2025-06-06
Payer: MEDICARE

## 2025-06-03 DIAGNOSIS — I10 ESSENTIAL HYPERTENSION WITH GOAL BLOOD PRESSURE LESS THAN 130/80: ICD-10-CM

## 2025-06-03 DIAGNOSIS — D50.9 IRON DEFICIENCY ANEMIA, UNSPECIFIED IRON DEFICIENCY ANEMIA TYPE: ICD-10-CM

## 2025-06-03 DIAGNOSIS — E11.29 TYPE 2 DIABETES MELLITUS WITH MICROALBUMINURIA, WITHOUT LONG-TERM CURRENT USE OF INSULIN (HCC): ICD-10-CM

## 2025-06-03 DIAGNOSIS — R80.9 TYPE 2 DIABETES MELLITUS WITH MICROALBUMINURIA, WITHOUT LONG-TERM CURRENT USE OF INSULIN (HCC): ICD-10-CM

## 2025-06-03 DIAGNOSIS — E78.2 MIXED HYPERLIPIDEMIA: ICD-10-CM

## 2025-06-03 LAB
ALBUMIN SERPL-MCNC: 3.8 G/DL (ref 3.4–5)
ALBUMIN/GLOB SERPL: 1.2 (ref 0.8–1.7)
ALP SERPL-CCNC: 88 U/L (ref 45–117)
ALT SERPL-CCNC: 15 U/L (ref 10–50)
ANION GAP SERPL CALC-SCNC: 13 MMOL/L (ref 3–18)
AST SERPL-CCNC: 19 U/L (ref 10–38)
BILIRUB SERPL-MCNC: <0.2 MG/DL (ref 0.2–1)
BUN SERPL-MCNC: 40 MG/DL (ref 6–23)
BUN/CREAT SERPL: 29 (ref 12–20)
CALCIUM SERPL-MCNC: 10.1 MG/DL (ref 8.5–10.1)
CHLORIDE SERPL-SCNC: 104 MMOL/L (ref 98–107)
CHOLEST SERPL-MCNC: 147 MG/DL
CO2 SERPL-SCNC: 22 MMOL/L (ref 21–32)
CREAT SERPL-MCNC: 1.36 MG/DL (ref 0.6–1.3)
CREAT UR-MCNC: 90.3 MG/DL (ref 30–125)
ERYTHROCYTE [DISTWIDTH] IN BLOOD BY AUTOMATED COUNT: 12.9 % (ref 11.6–14.5)
EST. AVERAGE GLUCOSE BLD GHB EST-MCNC: 170 MG/DL
FERRITIN SERPL-MCNC: 82 NG/ML (ref 13–400)
GLOBULIN SER CALC-MCNC: 3.2 G/DL (ref 2–4)
GLUCOSE SERPL-MCNC: 135 MG/DL (ref 74–108)
HBA1C MFR BLD: 7.6 % (ref 4.2–5.6)
HCT VFR BLD AUTO: 32.4 % (ref 36–48)
HDLC SERPL-MCNC: 66 MG/DL (ref 40–60)
HDLC SERPL: 2.2 (ref 0–5)
HGB BLD-MCNC: 10.2 G/DL (ref 13–16)
IRON SATN MFR SERPL: 27 %
IRON SERPL-MCNC: 67 UG/DL (ref 50–175)
LDLC SERPL CALC-MCNC: 63 MG/DL (ref 0–100)
MCH RBC QN AUTO: 32.3 PG (ref 24–34)
MCHC RBC AUTO-ENTMCNC: 31.5 G/DL (ref 31–37)
MCV RBC AUTO: 102.5 FL (ref 78–100)
MICROALBUMIN UR-MCNC: 11.3 MG/DL (ref 0–3)
MICROALBUMIN/CREAT UR-RTO: 125 MG/G (ref 0–30)
NRBC # BLD: 0 K/UL (ref 0–0.01)
NRBC BLD-RTO: 0 PER 100 WBC
PLATELET # BLD AUTO: 216 K/UL (ref 135–420)
PMV BLD AUTO: 10.8 FL (ref 9.2–11.8)
POTASSIUM SERPL-SCNC: 5.5 MMOL/L (ref 3.5–5.5)
PROT SERPL-MCNC: 7.1 G/DL (ref 6.4–8.2)
RBC # BLD AUTO: 3.16 M/UL (ref 4.35–5.65)
SODIUM SERPL-SCNC: 139 MMOL/L (ref 136–145)
TIBC SERPL-MCNC: 250 UG/DL (ref 250–450)
TRIGL SERPL-MCNC: 88 MG/DL (ref 0–150)
UIBC SERPL-MCNC: 183 UG/DL (ref 112–347)
VLDLC SERPL CALC-MCNC: 18 MG/DL
WBC # BLD AUTO: 5.1 K/UL (ref 4.6–13.2)

## 2025-06-03 PROCEDURE — 80053 COMPREHEN METABOLIC PANEL: CPT

## 2025-06-03 PROCEDURE — 83036 HEMOGLOBIN GLYCOSYLATED A1C: CPT

## 2025-06-03 PROCEDURE — 82728 ASSAY OF FERRITIN: CPT

## 2025-06-03 PROCEDURE — 83540 ASSAY OF IRON: CPT

## 2025-06-03 PROCEDURE — 36415 COLL VENOUS BLD VENIPUNCTURE: CPT

## 2025-06-03 PROCEDURE — 83550 IRON BINDING TEST: CPT

## 2025-06-03 PROCEDURE — 82570 ASSAY OF URINE CREATININE: CPT

## 2025-06-03 PROCEDURE — 80061 LIPID PANEL: CPT

## 2025-06-03 PROCEDURE — 82043 UR ALBUMIN QUANTITATIVE: CPT

## 2025-06-03 PROCEDURE — 85027 COMPLETE CBC AUTOMATED: CPT

## 2025-06-10 ENCOUNTER — OFFICE VISIT (OUTPATIENT)
Facility: CLINIC | Age: 85
End: 2025-06-10
Payer: MEDICARE

## 2025-06-10 VITALS
HEIGHT: 63 IN | DIASTOLIC BLOOD PRESSURE: 58 MMHG | WEIGHT: 141.2 LBS | TEMPERATURE: 97.1 F | SYSTOLIC BLOOD PRESSURE: 134 MMHG | HEART RATE: 78 BPM | OXYGEN SATURATION: 97 % | RESPIRATION RATE: 18 BRPM | BODY MASS INDEX: 25.02 KG/M2

## 2025-06-10 DIAGNOSIS — I10 ESSENTIAL HYPERTENSION WITH GOAL BLOOD PRESSURE LESS THAN 130/80: ICD-10-CM

## 2025-06-10 DIAGNOSIS — E78.2 MIXED HYPERLIPIDEMIA: ICD-10-CM

## 2025-06-10 DIAGNOSIS — E11.29 TYPE 2 DIABETES MELLITUS WITH MICROALBUMINURIA, WITHOUT LONG-TERM CURRENT USE OF INSULIN (HCC): Primary | ICD-10-CM

## 2025-06-10 DIAGNOSIS — N18.31 STAGE 3A CHRONIC KIDNEY DISEASE (HCC): ICD-10-CM

## 2025-06-10 DIAGNOSIS — R80.9 TYPE 2 DIABETES MELLITUS WITH MICROALBUMINURIA, WITHOUT LONG-TERM CURRENT USE OF INSULIN (HCC): Primary | ICD-10-CM

## 2025-06-10 PROCEDURE — 1160F RVW MEDS BY RX/DR IN RCRD: CPT | Performed by: NURSE PRACTITIONER

## 2025-06-10 PROCEDURE — 3051F HG A1C>EQUAL 7.0%<8.0%: CPT | Performed by: NURSE PRACTITIONER

## 2025-06-10 PROCEDURE — 99214 OFFICE O/P EST MOD 30 MIN: CPT | Performed by: NURSE PRACTITIONER

## 2025-06-10 PROCEDURE — 1126F AMNT PAIN NOTED NONE PRSNT: CPT | Performed by: NURSE PRACTITIONER

## 2025-06-10 PROCEDURE — 3078F DIAST BP <80 MM HG: CPT | Performed by: NURSE PRACTITIONER

## 2025-06-10 PROCEDURE — 1159F MED LIST DOCD IN RCRD: CPT | Performed by: NURSE PRACTITIONER

## 2025-06-10 PROCEDURE — 3075F SYST BP GE 130 - 139MM HG: CPT | Performed by: NURSE PRACTITIONER

## 2025-06-10 PROCEDURE — 1123F ACP DISCUSS/DSCN MKR DOCD: CPT | Performed by: NURSE PRACTITIONER

## 2025-06-10 RX ORDER — BLOOD-GLUCOSE METER
1 KIT MISCELLANEOUS DAILY
Qty: 1 KIT | Refills: 0 | Status: SHIPPED | OUTPATIENT
Start: 2025-06-10

## 2025-06-10 RX ORDER — GLUCOSAMINE HCL/CHONDROITIN SU 500-400 MG
CAPSULE ORAL
Qty: 100 STRIP | Refills: 3 | Status: SHIPPED | OUTPATIENT
Start: 2025-06-10

## 2025-06-10 ASSESSMENT — PATIENT HEALTH QUESTIONNAIRE - PHQ9
1. LITTLE INTEREST OR PLEASURE IN DOING THINGS: NOT AT ALL
SUM OF ALL RESPONSES TO PHQ QUESTIONS 1-9: 0
SUM OF ALL RESPONSES TO PHQ QUESTIONS 1-9: 0
2. FEELING DOWN, DEPRESSED OR HOPELESS: NOT AT ALL
SUM OF ALL RESPONSES TO PHQ QUESTIONS 1-9: 0
SUM OF ALL RESPONSES TO PHQ QUESTIONS 1-9: 0

## 2025-06-10 NOTE — PROGRESS NOTES
Have you been to the ER, urgent care clinic since your last visit?  Hospitalized since your last visit?   NO    Have you seen or consulted any other health care providers outside our system since your last visit?   YES - Foot doctor

## 2025-06-10 NOTE — PROGRESS NOTES
[address] [place] [address]               [phone]      Barry Stone is a 84 y.o. male and presents with Follow-up, Hypertension, Diabetes, and Hyperlipidemia       Assessment/Plan:    1. Type 2 diabetes mellitus with microalbuminuria, without long-term current use of insulin (HCC)  -     glucose monitoring kit; DAILY Starting Tue 6/10/2025, Disp-1 kit, R-0, Normal  -     blood glucose monitor strips; Test 1 times a day & as needed for symptoms of irregular blood glucose. Dispense sufficient amount for indicated testing frequency plus additional to accommodate PRN testing needs., Disp-100 strip, R-3, Normal  -     HM DIABETES FOOT EXAM  -     Hemoglobin A1C; Future  -     Albumin/Creatinine Ratio, Urine; Future  2. Stage 3a chronic kidney disease (HCC)  Assessment & Plan:   Chronic, at goal (stable), continue current treatment plan  3. Essential hypertension with goal blood pressure less than 130/80  -     Comprehensive Metabolic Panel; Future  -     CBC; Future  4. Mixed hyperlipidemia  -     Lipid Panel; Future    Accompanied today by his son Elliot who helped with obtaining information   Assessment & Plan  Diabetes Mellitus  - A1c is 7.6, within the goal of <8 for his age  - Currently taking metformin 1000 mg twice a day and pioglitazone 15 mg once a day  - New glucometer kit will be ordered to ensure continued monitoring of blood sugar levels  - Advised to maintain a healthy diet and regular exercise    Hypertension  - Blood pressure readings at home are better than those recorded in the office, suggesting possible white coat hypertension  - Currently taking amlodipine 10 mg, hydrochlorothiazide 25 mg, and losartan 100 mg daily  - Advised to continue monitoring blood pressure at home, especially in the afternoons to check for any variations  - Blood pressure will be rechecked before leaving the office    Hyperlipidemia  - Cholesterol levels are well-controlled with a total

## 2025-07-27 ENCOUNTER — RESULTS FOLLOW-UP (OUTPATIENT)
Facility: CLINIC | Age: 85
End: 2025-07-27

## 2025-09-03 ENCOUNTER — CARE COORDINATION (OUTPATIENT)
Facility: CLINIC | Age: 85
End: 2025-09-03

## 2025-09-03 DIAGNOSIS — R80.9 TYPE 2 DIABETES MELLITUS WITH MICROALBUMINURIA, WITHOUT LONG-TERM CURRENT USE OF INSULIN (HCC): Primary | ICD-10-CM

## 2025-09-03 DIAGNOSIS — I10 ESSENTIAL HYPERTENSION WITH GOAL BLOOD PRESSURE LESS THAN 130/80: ICD-10-CM

## 2025-09-03 DIAGNOSIS — E11.29 TYPE 2 DIABETES MELLITUS WITH MICROALBUMINURIA, WITHOUT LONG-TERM CURRENT USE OF INSULIN (HCC): Primary | ICD-10-CM

## 2025-09-03 PROCEDURE — 1111F DSCHRG MED/CURRENT MED MERGE: CPT | Performed by: NURSE PRACTITIONER

## 2025-09-03 RX ORDER — HYDROCHLOROTHIAZIDE 25 MG/1
25 TABLET ORAL DAILY
Qty: 90 TABLET | Refills: 1 | Status: SHIPPED | OUTPATIENT
Start: 2025-09-03